# Patient Record
Sex: MALE | Race: WHITE | NOT HISPANIC OR LATINO | Employment: OTHER | RURAL
[De-identification: names, ages, dates, MRNs, and addresses within clinical notes are randomized per-mention and may not be internally consistent; named-entity substitution may affect disease eponyms.]

---

## 2017-01-27 RX ORDER — GABAPENTIN 800 MG/1
TABLET ORAL
Qty: 120 TABLET | Refills: 4 | Status: SHIPPED | OUTPATIENT
Start: 2017-01-27 | End: 2017-07-05 | Stop reason: SDUPTHER

## 2017-02-01 ENCOUNTER — TELEPHONE (OUTPATIENT)
Dept: FAMILY MEDICINE CLINIC | Facility: CLINIC | Age: 47
End: 2017-02-01

## 2017-02-01 RX ORDER — AMOXICILLIN AND CLAVULANATE POTASSIUM 875; 125 MG/1; MG/1
1 TABLET, FILM COATED ORAL 2 TIMES DAILY
Qty: 14 TABLET | Refills: 0 | Status: SHIPPED | OUTPATIENT
Start: 2017-02-01 | End: 2017-02-09

## 2017-02-01 NOTE — TELEPHONE ENCOUNTER
Patient called complaining of right ear pain and sore throat. He would like an antibiotic sent to [facility].  Ok to send in Augmentin 875 mg by mouth twice daily x 7 days per v.o. Dr Spencer/LARA Chen.  Med sent and pt notified.

## 2017-02-09 ENCOUNTER — OFFICE VISIT (OUTPATIENT)
Dept: FAMILY MEDICINE CLINIC | Facility: CLINIC | Age: 47
End: 2017-02-09

## 2017-02-09 VITALS
WEIGHT: 228.1 LBS | OXYGEN SATURATION: 98 % | HEART RATE: 94 BPM | SYSTOLIC BLOOD PRESSURE: 118 MMHG | HEIGHT: 72 IN | DIASTOLIC BLOOD PRESSURE: 64 MMHG | TEMPERATURE: 98.6 F | BODY MASS INDEX: 30.89 KG/M2

## 2017-02-09 DIAGNOSIS — Z79.4 TYPE 2 DIABETES MELLITUS WITH COMPLICATION, WITH LONG-TERM CURRENT USE OF INSULIN (HCC): Primary | ICD-10-CM

## 2017-02-09 DIAGNOSIS — IMO0002 UNCONTROLLED TYPE 2 DIABETES MELLITUS WITH OTHER NEUROLOGIC COMPLICATION, WITH LONG-TERM CURRENT USE OF INSULIN: ICD-10-CM

## 2017-02-09 DIAGNOSIS — G89.29 CHRONIC BILATERAL LOW BACK PAIN WITH SCIATICA, SCIATICA LATERALITY UNSPECIFIED: ICD-10-CM

## 2017-02-09 DIAGNOSIS — F33.0 MILD EPISODE OF RECURRENT MAJOR DEPRESSIVE DISORDER (HCC): ICD-10-CM

## 2017-02-09 DIAGNOSIS — I10 ESSENTIAL HYPERTENSION: ICD-10-CM

## 2017-02-09 DIAGNOSIS — E11.8 TYPE 2 DIABETES MELLITUS WITH COMPLICATION, WITH LONG-TERM CURRENT USE OF INSULIN (HCC): Primary | ICD-10-CM

## 2017-02-09 DIAGNOSIS — E66.09 NON MORBID OBESITY DUE TO EXCESS CALORIES: ICD-10-CM

## 2017-02-09 DIAGNOSIS — E11.69 HYPERLIPIDEMIA ASSOCIATED WITH TYPE 2 DIABETES MELLITUS (HCC): ICD-10-CM

## 2017-02-09 DIAGNOSIS — E78.5 HYPERLIPIDEMIA ASSOCIATED WITH TYPE 2 DIABETES MELLITUS (HCC): ICD-10-CM

## 2017-02-09 DIAGNOSIS — M54.40 CHRONIC BILATERAL LOW BACK PAIN WITH SCIATICA, SCIATICA LATERALITY UNSPECIFIED: ICD-10-CM

## 2017-02-09 DIAGNOSIS — Z71.6 TOBACCO ABUSE COUNSELING: ICD-10-CM

## 2017-02-09 DIAGNOSIS — Z98.890 HISTORY OF BACK SURGERY: ICD-10-CM

## 2017-02-09 PROCEDURE — 99214 OFFICE O/P EST MOD 30 MIN: CPT | Performed by: FAMILY MEDICINE

## 2017-02-09 RX ORDER — DULOXETIN HYDROCHLORIDE 60 MG/1
60 CAPSULE, DELAYED RELEASE ORAL DAILY
Qty: 90 CAPSULE | Refills: 3 | Status: SHIPPED | OUTPATIENT
Start: 2017-02-09 | End: 2018-03-03 | Stop reason: SDUPTHER

## 2017-02-09 RX ORDER — INSULIN GLARGINE 100 [IU]/ML
38 INJECTION, SOLUTION SUBCUTANEOUS NIGHTLY
Qty: 1 PEN | Refills: 5 | Status: SHIPPED | OUTPATIENT
Start: 2017-02-09 | End: 2017-05-30 | Stop reason: SDUPTHER

## 2017-02-09 RX ORDER — INSULIN GLARGINE 100 [IU]/ML
INJECTION, SOLUTION SUBCUTANEOUS
Refills: 4 | OUTPATIENT
Start: 2017-02-09

## 2017-02-09 RX ORDER — INSULIN GLARGINE 100 [IU]/ML
38 INJECTION, SOLUTION SUBCUTANEOUS NIGHTLY
COMMUNITY
Start: 2017-01-07 | End: 2017-02-09 | Stop reason: SDUPTHER

## 2017-02-12 PROBLEM — Z79.4 TYPE 2 DIABETES MELLITUS WITH COMPLICATION, WITH LONG-TERM CURRENT USE OF INSULIN (HCC): Status: ACTIVE | Noted: 2017-02-12

## 2017-02-12 PROBLEM — F33.0 MILD EPISODE OF RECURRENT MAJOR DEPRESSIVE DISORDER (HCC): Status: ACTIVE | Noted: 2017-02-12

## 2017-02-12 PROBLEM — E11.8 TYPE 2 DIABETES MELLITUS WITH COMPLICATION, WITH LONG-TERM CURRENT USE OF INSULIN (HCC): Status: ACTIVE | Noted: 2017-02-12

## 2017-02-12 NOTE — PATIENT INSTRUCTIONS
Fatigue  Fatigue is feeling tired all of the time, a lack of energy, or a lack of motivation. Occasional or mild fatigue is often a normal response to activity or life in general. However, long-lasting (chronic) or extreme fatigue may indicate an underlying medical condition.  HOME CARE INSTRUCTIONS   Watch your fatigue for any changes. The following actions may help to lessen any discomfort you are feeling:  · Talk to your health care provider about how much sleep you need each night. Try to get the required amount every night.  · Take medicines only as directed by your health care provider.  · Eat a healthy and nutritious diet. Ask your health care provider if you need help changing your diet.  · Drink enough fluid to keep your urine clear or pale yellow.  · Practice ways of relaxing, such as yoga, meditation, massage therapy, or acupuncture.  · Exercise regularly.    · Change situations that cause you stress. Try to keep your work and personal routine reasonable.  · Do not abuse illegal drugs.  · Limit alcohol intake to no more than 1 drink per day for nonpregnant women and 2 drinks per day for men. One drink equals 12 ounces of beer, 5 ounces of wine, or 1½ ounces of hard liquor.  · Take a multivitamin, if directed by your health care provider.  SEEK MEDICAL CARE IF:   · Your fatigue does not get better.  · You have a fever.    · You have unintentional weight loss or gain.  · You have headaches.    · You have difficulty:      Falling asleep.    Sleeping throughout the night.  · You feel angry, guilty, anxious, or sad.     · You are unable to have a bowel movement (constipation).    · You skin is dry.     · Your legs or another part of your body is swollen.    SEEK IMMEDIATE MEDICAL CARE IF:   · You feel confused.    · Your vision is blurry.  · You feel faint or pass out.    · You have a severe headache.    · You have severe abdominal, pelvic, or back pain.    · You have chest pain, shortness of breath, or an  irregular or fast heartbeat.    · You are unable to urinate or you urinate less than normal.    · You develop abnormal bleeding, such as bleeding from the rectum, vagina, nose, lungs, or nipples.  · You vomit blood.     · You have thoughts about harming yourself or committing suicide.    · You are worried that you might harm someone else.       This information is not intended to replace advice given to you by your health care provider. Make sure you discuss any questions you have with your health care provider.     Document Released: 10/14/2008 Document Revised: 01/08/2016 Document Reviewed: 04/21/2015  [employer] Interactive Patient Education ©2016 [employer]  Depression, Adult  Depression refers to feeling sad, low, down in the dumps, blue, gloomy, or empty. In general, there are two kinds of depression:  1. Normal sadness or normal grief. This kind of depression is one that we all feel from time to time after upsetting life experiences, such as the loss of a job or the ending of a relationship. This kind of depression is considered normal, is short lived, and resolves within a few days to 2 weeks. Depression experienced after the loss of a loved one (bereavement) often lasts longer than 2 weeks but normally gets better with time.  2. Clinical depression. This kind of depression lasts longer than normal sadness or normal grief or interferes with your ability to function at home, at work, and in school. It also interferes with your personal relationships. It affects almost every aspect of your life. Clinical depression is an illness.  Symptoms of depression can also be caused by conditions other than those mentioned above, such as:  · Physical illness. Some physical illnesses, including underactive thyroid gland (hypothyroidism), severe anemia, specific types of cancer, diabetes, uncontrolled seizures, heart and lung problems, strokes, and chronic pain are commonly associated with symptoms of depression.  · Side  effects of some prescription medicine. In some people, certain types of medicine can cause symptoms of depression.  · Substance abuse. Abuse of alcohol and illicit drugs can cause symptoms of depression.  SYMPTOMS  Symptoms of normal sadness and normal grief include the following:  · Feeling sad or crying for short periods of time.  · Not caring about anything (apathy).  · Difficulty sleeping or sleeping too much.  · No longer able to enjoy the things you used to enjoy.  · Desire to be by oneself all the time (social isolation).  · Lack of energy or motivation.  · Difficulty concentrating or remembering.  · Change in appetite or weight.  · Restlessness or agitation.  Symptoms of clinical depression include the same symptoms of normal sadness or normal grief and also the following symptoms:  · Feeling sad or crying all the time.  · Feelings of guilt or worthlessness.  · Feelings of hopelessness or helplessness.  · Thoughts of suicide or the desire to harm yourself (suicidal ideation).  · Loss of touch with reality (psychotic symptoms). Seeing or hearing things that are not real (hallucinations) or having false beliefs about your life or the people around you (delusions and paranoia).  DIAGNOSIS   The diagnosis of clinical depression is usually based on how bad the symptoms are and how long they have lasted. Your health care provider will also ask you questions about your medical history and substance use to find out if physical illness, use of prescription medicine, or substance abuse is causing your depression. Your health care provider may also order blood tests.  TREATMENT   Often, normal sadness and normal grief do not require treatment. However, sometimes antidepressant medicine is given for bereavement to ease the depressive symptoms until they resolve.  The treatment for clinical depression depends on how bad the symptoms are but often includes antidepressant medicine, counseling with a mental health  professional, or both. Your health care provider will help to determine what treatment is best for you.  Depression caused by physical illness usually goes away with appropriate medical treatment of the illness. If prescription medicine is causing depression, talk with your health care provider about stopping the medicine, decreasing the dose, or changing to another medicine.  Depression caused by the abuse of alcohol or illicit drugs goes away when you stop using these substances. Some adults need professional help in order to stop drinking or using drugs.  SEEK IMMEDIATE MEDICAL CARE IF:  · You have thoughts about hurting yourself or others.  · You lose touch with reality (have psychotic symptoms).  · You are taking medicine for depression and have a serious side effect.  FOR MORE INFORMATION  · National [place] on Mental Illness: www.bryan.org   · National [place] of Mental Health: www.nimh.nih.gov      This information is not intended to replace advice given to you by your health care provider. Make sure you discuss any questions you have with your health care provider.     Document Released: 12/15/2001 Document Revised: 01/08/2016 Document Reviewed: 03/18/2013  [employer] Interactive Patient Education ©2016 [employer]  Chronic Pain  Chronic pain can be defined as pain that is off and on and lasts for 3-6 months or longer. Many things cause chronic pain, which can make it difficult to make a diagnosis. There are many treatment options available for chronic pain. However, finding a treatment that works well for you may require trying various approaches until the right one is found. Many people benefit from a combination of two or more types of treatment to control their pain.  SYMPTOMS   Chronic pain can occur anywhere in the body and can range from mild to very severe. Some types of chronic pain include:  · Headache.  · Low back pain.  · Cancer pain.  · Arthritis pain.  · Neurogenic pain. This is pain  resulting from damage to nerves.   People with chronic pain may also have other symptoms such as:  · Depression.  · Anger.  · Insomnia.  · Anxiety.  DIAGNOSIS   Your health care provider will help diagnose your condition over time. In many cases, the initial focus will be on excluding possible conditions that could be causing the pain. Depending on your symptoms, your health care provider may order tests to diagnose your condition. Some of these tests may include:   · Blood tests.    · CT scan.    · MRI.    · X-rays.    · Ultrasounds.    · Nerve conduction studies.    You may need to see a specialist.   TREATMENT   Finding treatment that works well may take time. You may be referred to a pain specialist. He or she may prescribe medicine or therapies, such as:   · Mindful meditation or yoga.  · Shots (injections) of numbing or pain-relieving medicines into the spine or area of pain.  · Local electrical stimulation.  · Acupuncture.    · Massage therapy.    · Aroma, color, light, or sound therapy.    · Biofeedback.    · Working with a physical therapist to keep from getting stiff.    · Regular, gentle exercise.    · Cognitive or behavioral therapy.    · Group support.    Sometimes, surgery may be recommended.   HOME CARE INSTRUCTIONS   · Take all medicines as directed by your health care provider.    · Lessen stress in your life by relaxing and doing things such as listening to calming music.    · Exercise or be active as directed by your health care provider.    · Eat a healthy diet and include things such as vegetables, fruits, fish, and lean meats in your diet.    · Keep all follow-up appointments with your health care provider.    · Attend a support group with others suffering from chronic pain.  SEEK MEDICAL CARE IF:   · Your pain gets worse.    · You develop a new pain that was not there before.    · You cannot tolerate medicines given to you by your health care provider.    · You have new symptoms since your  last visit with your health care provider.    SEEK IMMEDIATE MEDICAL CARE IF:   · You feel weak.    · You have decreased sensation or numbness.    · You lose control of bowel or bladder function.    · Your pain suddenly gets much worse.    · You develop shaking.  · You develop chills.  · You develop confusion.  · You develop chest pain.  · You develop shortness of breath.    MAKE SURE YOU:  · Understand these instructions.  · Will watch your condition.  · Will get help right away if you are not doing well or get worse.     This information is not intended to replace advice given to you by your health care provider. Make sure you discuss any questions you have with your health care provider.     Document Released: 09/09/2003 Document Revised: 08/20/2014 Document Reviewed: 06/13/2014  [employer] Interactive Patient Education ©2016 [employer]

## 2017-02-12 NOTE — PROGRESS NOTES
Subjective   Mine Benedict is a 46 y.o. obese white male smoker with uncontrolled DM II, HTN, Diabetic neuropathy, Depression, Chronic Low back pain S/P Lumbar Laminectomy, and other health problems. see PMH/PSH. Pt presents for exam, F/u to discuss current health problems, meds, tx plan, recent eval with neurosurgery, and other health concerns.  ' R ear pain has improved some since [facility] called in. Feeling tired all the time, feeling weak all over. Has struggled with pain since losing coverage for Cymbalta on Ins, afraid to restart, coming off med was difficult, depressed about situation. Trying to watch diet, take Insulin, manage diabetes better. B/P has been good when checked'.    Weakness - Generalized   This is a chronic problem. The current episode started more than 1 month ago. The problem occurs daily. The problem has been gradually worsening. Associated symptoms include arthralgias, fatigue, myalgias and weakness. Pertinent negatives include no abdominal pain, chest pain or rash. The symptoms are aggravated by smoking. He has tried NSAIDs, relaxation, rest and acetaminophen for the symptoms. The treatment provided no relief.   Fatigue   This is a chronic problem. The current episode started more than 1 month ago. The problem occurs daily. The problem has been gradually worsening. Associated symptoms include arthralgias, fatigue, myalgias and weakness. Pertinent negatives include no abdominal pain, chest pain or rash. The symptoms are aggravated by smoking and eating. He has tried acetaminophen, NSAIDs and relaxation for the symptoms. The treatment provided no relief.   Diabetes   He presents for his follow-up diabetic visit. He has type 2 diabetes mellitus. No MedicAlert identification noted. His disease course has been fluctuating. Pertinent negatives for hypoglycemia include no confusion, nervousness/anxiousness or speech difficulty. Associated symptoms include fatigue and weakness. Pertinent negatives  for diabetes include no chest pain, no polydipsia, no polyphagia and no polyuria. There are no hypoglycemic complications. Symptoms are worsening. Diabetic complications include peripheral neuropathy. Current diabetic treatment includes diet and insulin injections. He is compliant with treatment most of the time. He is following a generally healthy diet. He has not had a previous visit with a dietitian. He participates in exercise intermittently. His home blood glucose trend is decreasing steadily. His overall blood glucose range is 180-200 mg/dl. An ACE inhibitor/angiotensin II receptor blocker is being taken. He does not see a podiatrist.Eye exam is current.   Hypertension   This is a chronic problem. The problem is unchanged. The problem is controlled. Pertinent negatives include no chest pain, palpitations or shortness of breath. Past treatments include ACE inhibitors. The current treatment provides significant improvement.   Back Pain   This is a chronic problem. The current episode started more than 1 year ago. The problem occurs daily. The problem has been gradually worsening since onset. The pain is present in the lumbar spine. The quality of the pain is described as aching. The pain is at a severity of 6/10. The pain is moderate. The symptoms are aggravated by stress, standing and bending. Associated symptoms include weakness. Pertinent negatives include no abdominal pain, chest pain or dysuria. He has tried analgesics, NSAIDs, muscle relaxant and home exercises for the symptoms. The treatment provided mild (Had good results with cymbalta in past.) relief.        The following portions of the patient's history were reviewed and updated as appropriate: allergies, current medications, past family history, past medical history, past social history, past surgical history and problem list.    Review of Systems   Constitutional: Positive for fatigue.   HENT: Positive for dental problem, ear pain and postnasal  drip.    Eyes: Negative for pain, discharge and visual disturbance.   Respiratory: Negative for apnea, shortness of breath and wheezing.    Cardiovascular: Negative for chest pain, palpitations and leg swelling.   Gastrointestinal: Negative for abdominal pain, blood in stool and constipation.   Endocrine: Negative for cold intolerance, heat intolerance, polydipsia, polyphagia and polyuria.   Genitourinary: Negative for discharge and dysuria.   Musculoskeletal: Positive for arthralgias, back pain and myalgias.   Skin: Negative for color change, rash and wound.   Allergic/Immunologic: Negative for environmental allergies and food allergies.   Neurological: Positive for weakness. Negative for syncope, facial asymmetry, speech difficulty and light-headedness.   Psychiatric/Behavioral: Positive for decreased concentration and dysphoric mood. Negative for agitation, behavioral problems, confusion, hallucinations, self-injury and suicidal ideas. The patient is not nervous/anxious.        Objective   Physical Exam   Constitutional: He is oriented to person, place, and time. He appears well-developed and well-nourished. No distress.   HENT:   Head: Normocephalic.   Right Ear: External ear normal.   Left Ear: External ear normal.   Nose: Nose normal.   Mouth/Throat: Oropharynx is clear and moist.   Eyes: Conjunctivae and EOM are normal. Pupils are equal, round, and reactive to light. Right eye exhibits no discharge. Left eye exhibits no discharge. No scleral icterus.   Neck: No JVD present. No tracheal deviation present. No thyromegaly present.   Cardiovascular: Normal rate and normal heart sounds.  Exam reveals no gallop and no friction rub.    No murmur heard.  Pulmonary/Chest: Effort normal and breath sounds normal. No stridor. No respiratory distress. He has no wheezes. He has no rales. He exhibits no tenderness.   Abdominal: Soft. Bowel sounds are normal. He exhibits no distension and no mass. There is no tenderness.  There is no rebound and no guarding. No hernia.   Musculoskeletal: He exhibits no edema, tenderness or deformity.   Has WB 6 with Claude SL, no worse with cross over.    Lymphadenopathy:     He has no cervical adenopathy.   Neurological: He is alert and oriented to person, place, and time. He has normal reflexes. He displays normal reflexes. No cranial nerve deficit. He exhibits normal muscle tone. Coordination normal.   Skin: Skin is warm and dry. No rash noted. He is not diaphoretic. No erythema. No pallor.   Psychiatric: His behavior is normal. Judgment and thought content normal.   Depressed mood       Assessment/Plan      Mine was seen today for weakness - generalized, generalized body aches and fatigue.    Diagnoses and all orders for this visit:    Type 2 diabetes mellitus with complication, with long-term current use of insulin  -     Hemoglobin A1c; Future  -     Comprehensive Metabolic Panel; Future  -     CBC & AUTO Differential; Future  -     Sedimentation Rate; Future  -     Urinalysis (clean catch); Future  -     MicroAlbumin, Urine, Random; Future    Hyperlipidemia associated with type 2 diabetes mellitus    Essential hypertension    Non morbid obesity due to excess calories    Uncontrolled type 2 diabetes mellitus with other neurologic complication, with long-term current use of insulin    History of back surgery    Tobacco abuse counseling    Chronic bilateral low back pain with sciatica, sciatica laterality unspecified    Mild episode of recurrent major depressive disorder    Other orders  -     DULoxetine (CYMBALTA) 60 MG capsule; Take 1 capsule by mouth Daily.  -     LANTUS SOLOSTAR 100 UNIT/ML injection pen; Inject 38 Units under the skin Every Night.      Discussed current health problem list, meds, indications, Tx plan. Discussed Cymbalta more affordable, Pt will investigate affordability, will restart if cost not prohibitive. Discussed BMI, BEE, 3-4 small meals, 1800 cristina/day ADA diet.  Discussed CBT therapy, counseling. Benefits of exercise. Discussed F/U plan here.

## 2017-02-14 ENCOUNTER — LAB (OUTPATIENT)
Dept: LAB | Facility: CLINIC | Age: 47
End: 2017-02-14

## 2017-02-14 DIAGNOSIS — E11.8 TYPE 2 DIABETES MELLITUS WITH COMPLICATION, WITH LONG-TERM CURRENT USE OF INSULIN (HCC): ICD-10-CM

## 2017-02-14 DIAGNOSIS — Z79.4 TYPE 2 DIABETES MELLITUS WITH COMPLICATION, WITH LONG-TERM CURRENT USE OF INSULIN (HCC): ICD-10-CM

## 2017-02-14 LAB
BASOPHILS # BLD AUTO: 0.04 10*3/MM3 (ref 0–0.2)
BASOPHILS NFR BLD AUTO: 0.5 % (ref 0–2)
BILIRUB UR QL STRIP: NEGATIVE
CLARITY UR: CLEAR
COLOR UR: ABNORMAL
DEPRECATED RDW RBC AUTO: 46.4 FL (ref 35.1–43.9)
EOSINOPHIL # BLD AUTO: 0.62 10*3/MM3 (ref 0–0.7)
EOSINOPHIL NFR BLD AUTO: 7.2 % (ref 0–7)
ERYTHROCYTE [DISTWIDTH] IN BLOOD BY AUTOMATED COUNT: 13.6 % (ref 11.5–14.5)
ERYTHROCYTE [SEDIMENTATION RATE] IN BLOOD: 3 MM/HR (ref 0–15)
GLUCOSE UR STRIP-MCNC: ABNORMAL MG/DL
HCT VFR BLD AUTO: 44.8 % (ref 39–49)
HGB BLD-MCNC: 15.3 G/DL (ref 13.7–17.3)
HGB UR QL STRIP.AUTO: NEGATIVE
IMM GRANULOCYTES # BLD: 0.03 10*3/MM3 (ref 0–0.02)
IMM GRANULOCYTES NFR BLD: 0.3 % (ref 0–0.5)
KETONES UR QL STRIP: ABNORMAL
LEUKOCYTE ESTERASE UR QL STRIP.AUTO: ABNORMAL
LYMPHOCYTES # BLD AUTO: 2.8 10*3/MM3 (ref 0.6–4.2)
LYMPHOCYTES NFR BLD AUTO: 32.5 % (ref 10–50)
MCH RBC QN AUTO: 32.2 PG (ref 26.5–34)
MCHC RBC AUTO-ENTMCNC: 34.2 G/DL (ref 31.5–36.3)
MCV RBC AUTO: 94.3 FL (ref 80–98)
MONOCYTES # BLD AUTO: 0.87 10*3/MM3 (ref 0–0.9)
MONOCYTES NFR BLD AUTO: 10.1 % (ref 0–12)
NEUTROPHILS # BLD AUTO: 4.26 10*3/MM3 (ref 2–8.6)
NEUTROPHILS NFR BLD AUTO: 49.4 % (ref 37–80)
NITRITE UR QL STRIP: NEGATIVE
PH UR STRIP.AUTO: <5 [PH] (ref 5–8)
PLATELET # BLD AUTO: 212 10*3/MM3 (ref 150–450)
PMV BLD AUTO: 11.6 FL (ref 8–12)
PROT UR QL STRIP: ABNORMAL
RBC # BLD AUTO: 4.75 10*6/MM3 (ref 4.37–5.74)
SP GR UR STRIP: 1.02 (ref 1–1.03)
UROBILINOGEN UR QL STRIP: ABNORMAL
WBC NRBC COR # BLD: 8.62 10*3/MM3 (ref 3.2–9.8)

## 2017-02-14 PROCEDURE — 83036 HEMOGLOBIN GLYCOSYLATED A1C: CPT | Performed by: FAMILY MEDICINE

## 2017-02-14 PROCEDURE — 80053 COMPREHEN METABOLIC PANEL: CPT | Performed by: FAMILY MEDICINE

## 2017-02-14 PROCEDURE — 85651 RBC SED RATE NONAUTOMATED: CPT | Performed by: FAMILY MEDICINE

## 2017-02-14 PROCEDURE — 82043 UR ALBUMIN QUANTITATIVE: CPT | Performed by: FAMILY MEDICINE

## 2017-02-14 PROCEDURE — 85025 COMPLETE CBC W/AUTO DIFF WBC: CPT | Performed by: FAMILY MEDICINE

## 2017-02-14 PROCEDURE — 81003 URINALYSIS AUTO W/O SCOPE: CPT | Performed by: FAMILY MEDICINE

## 2017-02-15 LAB
ALBUMIN SERPL-MCNC: 4 G/DL (ref 3.4–4.8)
ALBUMIN UR-MCNC: <0.6 MG/L
ALBUMIN/GLOB SERPL: 1.5 G/DL (ref 1.1–1.8)
ALP SERPL-CCNC: 76 U/L (ref 38–126)
ALT SERPL W P-5'-P-CCNC: 26 U/L (ref 21–72)
ANION GAP SERPL CALCULATED.3IONS-SCNC: 10 MMOL/L (ref 5–15)
AST SERPL-CCNC: 28 U/L (ref 17–59)
BILIRUB SERPL-MCNC: 0.3 MG/DL (ref 0.2–1.3)
BUN BLD-MCNC: 19 MG/DL (ref 7–21)
BUN/CREAT SERPL: 22.6 (ref 7–25)
CALCIUM SPEC-SCNC: 9 MG/DL (ref 8.4–10.2)
CHLORIDE SERPL-SCNC: 107 MMOL/L (ref 95–110)
CO2 SERPL-SCNC: 25 MMOL/L (ref 22–31)
CREAT BLD-MCNC: 0.84 MG/DL (ref 0.7–1.3)
GFR SERPL CREATININE-BSD FRML MDRD: 99 ML/MIN/1.73 (ref 63–147)
GLOBULIN UR ELPH-MCNC: 2.6 GM/DL (ref 2.3–3.5)
GLUCOSE BLD-MCNC: 201 MG/DL (ref 60–100)
HBA1C MFR BLD: 8.7 % (ref 4–5.6)
POTASSIUM BLD-SCNC: 5.4 MMOL/L (ref 3.5–5.1)
PROT SERPL-MCNC: 6.6 G/DL (ref 6.3–8.6)
SODIUM BLD-SCNC: 142 MMOL/L (ref 137–145)

## 2017-02-17 ENCOUNTER — TELEPHONE (OUTPATIENT)
Dept: FAMILY MEDICINE CLINIC | Facility: CLINIC | Age: 47
End: 2017-02-17

## 2017-02-17 NOTE — TELEPHONE ENCOUNTER
----- Message from Merlyn Stanton LPN sent at 2/16/2017  9:06 AM CST -----      ----- Message -----     From: Chris Spencer MD     Sent: 2/16/2017   8:03 AM       To: Merlyn Stanton LPN    Blood sugars have improved, still high, stay hydrated, eat small reg meals. C/W Insulin, will go over at next visit.

## 2017-02-23 ENCOUNTER — OFFICE VISIT (OUTPATIENT)
Dept: FAMILY MEDICINE CLINIC | Facility: CLINIC | Age: 47
End: 2017-02-23

## 2017-02-23 VITALS
HEART RATE: 82 BPM | DIASTOLIC BLOOD PRESSURE: 84 MMHG | TEMPERATURE: 97.5 F | BODY MASS INDEX: 31.77 KG/M2 | OXYGEN SATURATION: 99 % | SYSTOLIC BLOOD PRESSURE: 122 MMHG | WEIGHT: 234.6 LBS | HEIGHT: 72 IN

## 2017-02-23 DIAGNOSIS — E66.09 NON MORBID OBESITY DUE TO EXCESS CALORIES: ICD-10-CM

## 2017-02-23 DIAGNOSIS — F33.0 MILD EPISODE OF RECURRENT MAJOR DEPRESSIVE DISORDER (HCC): ICD-10-CM

## 2017-02-23 DIAGNOSIS — Z72.0 TOBACCO USER: ICD-10-CM

## 2017-02-23 DIAGNOSIS — M54.40 CHRONIC BILATERAL LOW BACK PAIN WITH SCIATICA, SCIATICA LATERALITY UNSPECIFIED: ICD-10-CM

## 2017-02-23 DIAGNOSIS — G89.29 CHRONIC BILATERAL LOW BACK PAIN WITH SCIATICA, SCIATICA LATERALITY UNSPECIFIED: ICD-10-CM

## 2017-02-23 DIAGNOSIS — E11.69 HYPERLIPIDEMIA ASSOCIATED WITH TYPE 2 DIABETES MELLITUS (HCC): Primary | ICD-10-CM

## 2017-02-23 DIAGNOSIS — IMO0002 UNCONTROLLED TYPE 2 DIABETES MELLITUS WITH OTHER NEUROLOGIC COMPLICATION, WITH LONG-TERM CURRENT USE OF INSULIN: ICD-10-CM

## 2017-02-23 DIAGNOSIS — I10 ESSENTIAL HYPERTENSION: ICD-10-CM

## 2017-02-23 DIAGNOSIS — Z98.890 HISTORY OF BACK SURGERY: ICD-10-CM

## 2017-02-23 DIAGNOSIS — E78.5 HYPERLIPIDEMIA ASSOCIATED WITH TYPE 2 DIABETES MELLITUS (HCC): Primary | ICD-10-CM

## 2017-02-23 PROCEDURE — 99214 OFFICE O/P EST MOD 30 MIN: CPT | Performed by: FAMILY MEDICINE

## 2017-02-23 RX ORDER — CYCLOBENZAPRINE HCL 10 MG
TABLET ORAL
Qty: 90 TABLET | Refills: 0 | Status: SHIPPED | OUTPATIENT
Start: 2017-02-23 | End: 2017-11-09

## 2017-02-23 RX ORDER — LISINOPRIL 20 MG/1
TABLET ORAL
Qty: 90 TABLET | Refills: 4 | Status: SHIPPED | OUTPATIENT
Start: 2017-02-23 | End: 2018-03-03 | Stop reason: SDUPTHER

## 2017-02-23 NOTE — PROGRESS NOTES
Subjective   Mine Benedict is a 46 y.o. obese white male smoker with uncontrolled DM II, HTN, Diabetic neuropathy, Depression, Chronic Low back pain S/P Lumbar Laminectomy, and other health problems. see PMH/PSH. Pt presents for exam, F/u to discuss current health problems, meds, tx plan and f/U plan.  ' Able to get Insulin, trying to watch diet again, Ins has approved Cymbalta, was helpful in past, but does not want to start if will not be able to continue, awful coming off cymbalta'.      Weakness - Generalized   This is a chronic problem. The current episode started more than 1 month ago. The problem occurs daily. The problem has been gradually worsening. Associated symptoms include arthralgias, fatigue, myalgias and weakness. Pertinent negatives include no abdominal pain, chest pain or rash. The symptoms are aggravated by smoking. He has tried NSAIDs, relaxation, rest and acetaminophen for the symptoms. The treatment provided mild relief.   Fatigue   This is a chronic problem. The current episode started more than 1 month ago. The problem occurs daily. The problem has been gradually worsening. Associated symptoms include arthralgias, fatigue, myalgias and weakness. Pertinent negatives include no abdominal pain, chest pain or rash. The symptoms are aggravated by smoking and eating. He has tried acetaminophen, NSAIDs and relaxation for the symptoms. The treatment provided no relief.   Diabetes   He presents for his follow-up diabetic visit. He has type 2 diabetes mellitus. No MedicAlert identification noted. His disease course has been fluctuating. Pertinent negatives for hypoglycemia include no confusion, nervousness/anxiousness or speech difficulty. Associated symptoms include fatigue and weakness. Pertinent negatives for diabetes include no chest pain, no polydipsia, no polyphagia and no polyuria. There are no hypoglycemic complications. Symptoms are worsening. Diabetic complications include peripheral  neuropathy. Current diabetic treatment includes diet and insulin injections. He is compliant with treatment most of the time. He is following a generally healthy diet. He has not had a previous visit with a dietitian. He participates in exercise intermittently. His home blood glucose trend is decreasing steadily. His overall blood glucose range is 180-200 mg/dl. An ACE inhibitor/angiotensin II receptor blocker is being taken. He does not see a podiatrist.Eye exam is current.   Hypertension   This is a chronic problem. The problem is unchanged. The problem is controlled. Pertinent negatives include no chest pain, palpitations or shortness of breath. Past treatments include ACE inhibitors. The current treatment provides significant improvement.   Back Pain   This is a chronic problem. The current episode started more than 1 year ago. The problem occurs daily. The problem has been gradually worsening since onset. The pain is present in the lumbar spine. The quality of the pain is described as aching. The pain is at a severity of 6/10. The pain is moderate. The symptoms are aggravated by stress, standing and bending. Associated symptoms include weakness. Pertinent negatives include no abdominal pain, chest pain or dysuria. He has tried analgesics, NSAIDs, muscle relaxant and home exercises for the symptoms. The treatment provided mild (Had good results with cymbalta in past.) relief.   Depression   Visit Type: follow-up  Patient presents with the following symptoms: anhedonia, decreased concentration, depressed mood, fatigue and malaise.  Patient is not experiencing: confusion, nervousness/anxiety, palpitations, shortness of breath and suicidal ideas.  Frequency of symptoms: most days   Severity: moderate   Sleep quality: fair  Nighttime awakenings: one to two  Side effects:  Insomnia and weight gain       The following portions of the patient's history were reviewed and updated as appropriate: allergies, current  medications, past family history, past medical history, past social history, past surgical history and problem list.    Review of Systems   Constitutional: Positive for fatigue.   HENT: Positive for dental problem, ear pain and postnasal drip.    Eyes: Negative for pain, discharge and visual disturbance.   Respiratory: Negative for apnea, shortness of breath and wheezing.    Cardiovascular: Negative for chest pain, palpitations and leg swelling.   Gastrointestinal: Negative for abdominal pain, blood in stool and constipation.   Endocrine: Negative for cold intolerance, heat intolerance, polydipsia, polyphagia and polyuria.   Genitourinary: Negative for discharge and dysuria.   Musculoskeletal: Positive for arthralgias, back pain and myalgias.   Skin: Negative for color change, rash and wound.   Allergic/Immunologic: Negative for environmental allergies and food allergies.   Neurological: Positive for weakness. Negative for syncope, facial asymmetry, speech difficulty and light-headedness.   Psychiatric/Behavioral: Positive for decreased concentration and dysphoric mood. Negative for agitation, behavioral problems, confusion, hallucinations, self-injury and suicidal ideas. The patient is not nervous/anxious.        Objective   Physical Exam   Constitutional: He is oriented to person, place, and time. He appears well-developed and well-nourished. No distress.   HENT:   Head: Normocephalic and atraumatic.   Right Ear: External ear normal.   Left Ear: External ear normal.   Nose: Nose normal.   Mouth/Throat: Oropharynx is clear and moist.   Eyes: Conjunctivae and EOM are normal. Pupils are equal, round, and reactive to light. Right eye exhibits no discharge. Left eye exhibits no discharge. No scleral icterus.   Neck: Normal range of motion. No JVD present. No tracheal deviation present. No thyromegaly present.   Cardiovascular: Normal rate, regular rhythm, normal heart sounds and intact distal pulses.  Exam reveals no  gallop and no friction rub.    No murmur heard.  Pulmonary/Chest: Effort normal and breath sounds normal. No stridor. No respiratory distress. He has no wheezes. He has no rales. He exhibits no tenderness.   Abdominal: Soft. Bowel sounds are normal. He exhibits no distension and no mass. There is no tenderness. There is no rebound and no guarding. No hernia.   Musculoskeletal: He exhibits no edema, tenderness or deformity.   Has WB 6 with Claude SL, no worse with cross over.    Lymphadenopathy:     He has no cervical adenopathy.   Neurological: He is alert and oriented to person, place, and time. He has normal reflexes. He displays normal reflexes. No cranial nerve deficit. He exhibits normal muscle tone. Coordination normal.   Skin: Skin is warm and dry. No rash noted. He is not diaphoretic. No erythema. No pallor.   Psychiatric: His behavior is normal. Judgment and thought content normal.   Depressed mood   Nursing note and vitals reviewed.      Assessment/Plan      Mine was seen today for diabetes, pain and depression.    Diagnoses and all orders for this visit:    Hyperlipidemia associated with type 2 diabetes mellitus    Essential hypertension    Non morbid obesity due to excess calories    Uncontrolled type 2 diabetes mellitus with other neurologic complication, with long-term current use of insulin    Chronic bilateral low back pain with sciatica, sciatica laterality unspecified    Tobacco user    History of back surgery    Mild episode of recurrent major depressive disorder      Discussed current health problem, meds, indications, tx plan, rationale. Pt has DM. Chronic back pain, had stopped all meds, due to $ issues. Has Ins, discussed improvement, goals, F/U plan. Smoking cessation discussed x 3mins.

## 2017-02-23 NOTE — PATIENT INSTRUCTIONS
Diabetes and Standards of Medical Care  Diabetes is complicated. You may find that your diabetes team includes a dietitian, nurse, diabetes educator, eye doctor, and more. To help everyone know what is going on and to help you get the care you deserve, the following schedule of care was developed to help keep you on track. Below are the tests, exams, vaccines, medicines, education, and plans you will need.  HbA1c test  This test shows how well you have controlled your glucose over the past 2-3 months. It is used to see if your diabetes management plan needs to be adjusted.   · It is performed at least 2 times a year if you are meeting treatment goals.  · It is performed 4 times a year if therapy has changed or if you are not meeting treatment goals.  Blood pressure test  · This test is performed at every routine medical visit. The goal is less than 140/90 mm Hg for most people, but 130/80 mm Hg in some cases. Ask your health care provider about your goal.  Dental exam  · Follow up with the dentist regularly.  Eye exam  · If you are diagnosed with type 1 diabetes as a child, get an exam upon reaching the age of 10 years or older and having had diabetes for 3-5 years. Yearly eye exams are recommended after that initial eye exam.  · If you are diagnosed with type 1 diabetes as an adult, get an exam within 5 years of diagnosis and then yearly.  · If you are diagnosed with type 2 diabetes, get an exam as soon as possible after the diagnosis and then yearly.  Foot care exam  · Visual foot exams are performed at every routine medical visit. The exams check for cuts, injuries, or other problems with the feet.  · You should have a complete foot exam performed every year. This exam includes an inspection of the structure and skin of your feet, a check of the pulses in your feet, and a check of the sensation in your feet.    Type 1 diabetes: The first exam is performed 5 years after diagnosis.    Type 2 diabetes: The first  exam is performed at the time of diagnosis.  · Check your feet nightly for cuts, injuries, or other problems with your feet. Tell your health care provider if anything is not healing.  Kidney function test (urine microalbumin)  · This test is performed once a year.  ¨ Type 1 diabetes: The first test is performed 5 years after diagnosis.  ¨ Type 2 diabetes: The first test is performed at the time of diagnosis.  · A serum creatinine and estimated glomerular filtration rate (eGFR) test is done once a year to assess the level of chronic kidney disease (CKD), if present.  Lipid profile (cholesterol, HDL, LDL, triglycerides)  · Performed every 5 years for most people.    The goal for LDL is less than 100 mg/dL. If you are at high risk, the goal is less than 70 mg/dL.    The goal for HDL is 40 mg/dL-50 mg/dL for men and 50 mg/dL-60 mg/dL for women. An HDL cholesterol of 60 mg/dL or higher gives some protection against heart disease.    The goal for triglycerides is less than 150 mg/dL.  Immunizations  · The flu (influenza) vaccine is recommended yearly for every person 6 months of age or older who has diabetes.  · The pneumonia (pneumococcal) vaccine is recommended for every person 2 years of age or older who has diabetes. Adults 65 years of age or older may receive the pneumonia vaccine as a series of two separate shots.  · The hepatitis B vaccine is recommended for adults shortly after they have been diagnosed with diabetes.  · The Tdap (tetanus, diphtheria, and pertussis) vaccine should be given:    According to normal childhood vaccination schedules, for children.    Every 10 years, for adults who have diabetes.  Diabetes self-management education  · Education is recommended at diagnosis and ongoing as needed.  Treatment plan  · Your treatment plan is reviewed at every medical visit.     This information is not intended to replace advice given to you by your health care provider. Make sure you discuss any questions you  have with your health care provider.     Document Released: 10/15/2010 Document Revised: 01/08/2016 Document Reviewed: 05/20/2014  Elsevier Interactive Patient Education ©2016 [employer]

## 2017-05-16 ENCOUNTER — OFFICE VISIT (OUTPATIENT)
Dept: FAMILY MEDICINE CLINIC | Facility: CLINIC | Age: 47
End: 2017-05-16

## 2017-05-16 VITALS
TEMPERATURE: 98.5 F | HEART RATE: 116 BPM | BODY MASS INDEX: 32.29 KG/M2 | OXYGEN SATURATION: 98 % | DIASTOLIC BLOOD PRESSURE: 78 MMHG | WEIGHT: 238.4 LBS | SYSTOLIC BLOOD PRESSURE: 124 MMHG | HEIGHT: 72 IN

## 2017-05-16 DIAGNOSIS — I10 ESSENTIAL HYPERTENSION: ICD-10-CM

## 2017-05-16 DIAGNOSIS — K04.7 ABSCESSED TOOTH: Primary | ICD-10-CM

## 2017-05-16 DIAGNOSIS — IMO0002 UNCONTROLLED TYPE 2 DIABETES MELLITUS WITH OTHER NEUROLOGIC COMPLICATION, WITH LONG-TERM CURRENT USE OF INSULIN: ICD-10-CM

## 2017-05-16 PROCEDURE — 96372 THER/PROPH/DIAG INJ SC/IM: CPT | Performed by: FAMILY MEDICINE

## 2017-05-16 PROCEDURE — 99214 OFFICE O/P EST MOD 30 MIN: CPT | Performed by: FAMILY MEDICINE

## 2017-05-16 RX ORDER — CEFTRIAXONE 1 G/1
1 INJECTION, POWDER, FOR SOLUTION INTRAMUSCULAR; INTRAVENOUS ONCE
Status: COMPLETED | OUTPATIENT
Start: 2017-05-16 | End: 2017-05-16

## 2017-05-16 RX ORDER — CEFDINIR 300 MG/1
300 CAPSULE ORAL 2 TIMES DAILY
Qty: 14 CAPSULE | Refills: 0 | Status: SHIPPED | OUTPATIENT
Start: 2017-05-16 | End: 2017-06-07

## 2017-05-16 RX ORDER — KETOROLAC TROMETHAMINE 10 MG/1
10 TABLET, FILM COATED ORAL EVERY 6 HOURS PRN
Qty: 12 TABLET | Refills: 0 | Status: SHIPPED | OUTPATIENT
Start: 2017-05-16 | End: 2017-06-07

## 2017-05-16 RX ORDER — HYDROCODONE BITARTRATE AND ACETAMINOPHEN 7.5; 325 MG/1; MG/1
1 TABLET ORAL EVERY 6 HOURS PRN
Qty: 30 TABLET | Refills: 0 | Status: SHIPPED | OUTPATIENT
Start: 2017-05-16 | End: 2017-05-22 | Stop reason: SDUPTHER

## 2017-05-16 RX ORDER — KETOROLAC TROMETHAMINE 30 MG/ML
30 INJECTION, SOLUTION INTRAMUSCULAR; INTRAVENOUS ONCE
Status: COMPLETED | OUTPATIENT
Start: 2017-05-16 | End: 2017-05-16

## 2017-05-16 RX ADMIN — CEFTRIAXONE 1 G: 1 INJECTION, POWDER, FOR SOLUTION INTRAMUSCULAR; INTRAVENOUS at 16:34

## 2017-05-16 RX ADMIN — KETOROLAC TROMETHAMINE 30 MG: 30 INJECTION, SOLUTION INTRAMUSCULAR; INTRAVENOUS at 16:35

## 2017-05-22 ENCOUNTER — OFFICE VISIT (OUTPATIENT)
Dept: FAMILY MEDICINE CLINIC | Facility: CLINIC | Age: 47
End: 2017-05-22

## 2017-05-22 VITALS
WEIGHT: 237.1 LBS | TEMPERATURE: 98.3 F | HEIGHT: 72 IN | DIASTOLIC BLOOD PRESSURE: 80 MMHG | BODY MASS INDEX: 32.12 KG/M2 | HEART RATE: 85 BPM | OXYGEN SATURATION: 99 % | SYSTOLIC BLOOD PRESSURE: 130 MMHG

## 2017-05-22 DIAGNOSIS — IMO0002 UNCONTROLLED TYPE 2 DIABETES MELLITUS WITH OTHER NEUROLOGIC COMPLICATION, WITH LONG-TERM CURRENT USE OF INSULIN: ICD-10-CM

## 2017-05-22 DIAGNOSIS — K04.7 ABSCESSED TOOTH: Primary | ICD-10-CM

## 2017-05-22 PROCEDURE — 99213 OFFICE O/P EST LOW 20 MIN: CPT | Performed by: FAMILY MEDICINE

## 2017-05-22 RX ORDER — HYDROCODONE BITARTRATE AND ACETAMINOPHEN 10; 325 MG/1; MG/1
1 TABLET ORAL EVERY 6 HOURS PRN
Qty: 30 TABLET | Refills: 0 | Status: SHIPPED | OUTPATIENT
Start: 2017-05-22 | End: 2017-06-07

## 2017-05-22 RX ORDER — HYDROCODONE BITARTRATE AND ACETAMINOPHEN 7.5; 325 MG/1; MG/1
1 TABLET ORAL EVERY 6 HOURS PRN
Qty: 30 TABLET | Refills: 0 | Status: SHIPPED | OUTPATIENT
Start: 2017-05-22 | End: 2017-05-22

## 2017-05-31 RX ORDER — INSULIN GLARGINE 100 [IU]/ML
INJECTION, SOLUTION SUBCUTANEOUS
Qty: 15 ML | Refills: 0 | Status: SHIPPED | OUTPATIENT
Start: 2017-05-31 | End: 2017-06-07

## 2017-06-07 ENCOUNTER — LAB (OUTPATIENT)
Dept: LAB | Facility: CLINIC | Age: 47
End: 2017-06-07

## 2017-06-07 ENCOUNTER — OFFICE VISIT (OUTPATIENT)
Dept: FAMILY MEDICINE CLINIC | Facility: CLINIC | Age: 47
End: 2017-06-07

## 2017-06-07 VITALS
OXYGEN SATURATION: 96 % | TEMPERATURE: 98.8 F | WEIGHT: 234.3 LBS | HEIGHT: 72 IN | HEART RATE: 93 BPM | DIASTOLIC BLOOD PRESSURE: 76 MMHG | BODY MASS INDEX: 31.74 KG/M2 | SYSTOLIC BLOOD PRESSURE: 109 MMHG

## 2017-06-07 DIAGNOSIS — K04.7 ABSCESSED TOOTH: ICD-10-CM

## 2017-06-07 DIAGNOSIS — G89.29 CHRONIC BILATERAL LOW BACK PAIN WITH SCIATICA, SCIATICA LATERALITY UNSPECIFIED: ICD-10-CM

## 2017-06-07 DIAGNOSIS — IMO0002 UNCONTROLLED TYPE 2 DIABETES MELLITUS WITH OTHER NEUROLOGIC COMPLICATION, WITH LONG-TERM CURRENT USE OF INSULIN: Primary | ICD-10-CM

## 2017-06-07 DIAGNOSIS — I10 ESSENTIAL HYPERTENSION: ICD-10-CM

## 2017-06-07 DIAGNOSIS — IMO0002 UNCONTROLLED TYPE 2 DIABETES MELLITUS WITH OTHER NEUROLOGIC COMPLICATION, WITH LONG-TERM CURRENT USE OF INSULIN: ICD-10-CM

## 2017-06-07 DIAGNOSIS — M54.40 CHRONIC BILATERAL LOW BACK PAIN WITH SCIATICA, SCIATICA LATERALITY UNSPECIFIED: ICD-10-CM

## 2017-06-07 PROCEDURE — 99214 OFFICE O/P EST MOD 30 MIN: CPT | Performed by: FAMILY MEDICINE

## 2017-06-07 PROCEDURE — 83036 HEMOGLOBIN GLYCOSYLATED A1C: CPT | Performed by: FAMILY MEDICINE

## 2017-06-07 RX ORDER — INSULIN GLARGINE 100 [IU]/ML
38 INJECTION, SOLUTION SUBCUTANEOUS NIGHTLY
Qty: 6 PEN | Refills: 5 | Status: SHIPPED | OUTPATIENT
Start: 2017-06-07 | End: 2017-12-28 | Stop reason: SDUPTHER

## 2017-06-07 RX ORDER — AMOXICILLIN AND CLAVULANATE POTASSIUM 875; 125 MG/1; MG/1
TABLET, FILM COATED ORAL
COMMUNITY
Start: 2017-06-03 | End: 2017-06-07

## 2017-06-07 NOTE — PROGRESS NOTES
Subjective    Mine Benedict is a 47 y.o. obese white male with uncontrolled DM II, HTN, Diabetic neuropathy, Depression, Chronic Low back pain S/P Lumbar Laminectomy, and other health problems. see PMH/PSH. Pt presents for exam, to discuss current acute health problem, meds, tx plan..     ' Had abscessed tooth extracted. Ins notified Lantus no longer covered will have to change to other long acting Insulin. Currently using Lantus 38 units daily, and Apidra 20-30 with meals.'    Diabetes   He presents for his follow-up diabetic visit. He has type 2 diabetes mellitus. No MedicAlert identification noted. His disease course has been fluctuating. Pertinent negatives for hypoglycemia include no confusion, nervousness/anxiousness or speech difficulty. Associated symptoms include fatigue. Pertinent negatives for diabetes include no chest pain, no polydipsia, no polyphagia, no polyuria and no weakness. There are no hypoglycemic complications. Symptoms are worsening. Diabetic complications include peripheral neuropathy. Current diabetic treatment includes diet and insulin injections. He is compliant with treatment most of the time. He is following a generally healthy diet. He has not had a previous visit with a dietitian. He participates in exercise intermittently. His home blood glucose trend is decreasing steadily. His overall blood glucose range is 180-200 mg/dl. An ACE inhibitor/angiotensin II receptor blocker is being taken. He does not see a podiatrist.Eye exam is current.   Hypertension   This is a chronic problem. The problem is unchanged. The problem is controlled. Pertinent negatives include no chest pain, palpitations or shortness of breath. Past treatments include ACE inhibitors. The current treatment provides significant improvement.   Dental Pain    The current episode started 1 to 4 weeks ago. The problem has been resolved. The pain is at a severity of 1/10. The pain is mild. Associated symptoms include  facial pain and thermal sensitivity. Pertinent negatives include no fever. He has tried NSAIDs (Norco, Ambesol, P.O Abx) for the symptoms. The treatment provided significant (Had tooth pulled.) relief.        The following portions of the patient's history were reviewed and updated as appropriate: allergies, current medications, past family history, past medical history, past social history, past surgical history and problem list.    Review of Systems   Constitutional: Positive for fatigue. Negative for fever.   HENT: Positive for dental problem. Negative for ear pain and postnasal drip.    Eyes: Negative for pain, discharge and visual disturbance.   Respiratory: Negative for apnea, shortness of breath and wheezing.    Cardiovascular: Negative for chest pain, palpitations and leg swelling.   Gastrointestinal: Negative for abdominal pain, blood in stool and constipation.   Endocrine: Negative for cold intolerance, heat intolerance, polydipsia, polyphagia and polyuria.   Genitourinary: Negative for discharge and dysuria.   Musculoskeletal: Positive for arthralgias, back pain and myalgias.   Skin: Negative for color change, rash and wound.   Allergic/Immunologic: Negative for environmental allergies and food allergies.   Neurological: Negative for syncope, facial asymmetry, speech difficulty, weakness and light-headedness.   Psychiatric/Behavioral: Positive for decreased concentration and dysphoric mood. Negative for agitation, behavioral problems, confusion, hallucinations, self-injury and suicidal ideas. The patient is not nervous/anxious.        Objective   Physical Exam   Constitutional: He is oriented to person, place, and time. He appears well-developed and well-nourished. No distress.   HENT:   Head: Normocephalic and atraumatic.   Right Ear: External ear normal.   Left Ear: External ear normal.   Nose: Nose normal.   Mouth/Throat: Oropharynx is clear and moist.   Gum inflammation R lower rear molar less  notable, has some drainage from tooth socket.    Eyes: Conjunctivae and EOM are normal. Pupils are equal, round, and reactive to light. Right eye exhibits no discharge. Left eye exhibits no discharge. No scleral icterus.   Neck: Normal range of motion. No JVD present. No tracheal deviation present. No thyromegaly present.   Cardiovascular: Normal rate, regular rhythm, normal heart sounds and intact distal pulses.  Exam reveals no gallop and no friction rub.    No murmur heard.  Pulmonary/Chest: Effort normal and breath sounds normal. No stridor. No respiratory distress. He has no wheezes. He has no rales. He exhibits no tenderness.   Abdominal: Soft. Bowel sounds are normal. He exhibits no distension. There is no tenderness.   Musculoskeletal: He exhibits no edema, tenderness or deformity.   Lymphadenopathy:     He has no cervical adenopathy.   Neurological: He is alert and oriented to person, place, and time. He has normal reflexes.   Skin: Skin is warm and dry. No rash noted. He is not diaphoretic. No erythema. No pallor.   Psychiatric: His behavior is normal. Judgment and thought content normal.   Depressed mood   Nursing note and vitals reviewed.      Assessment/Plan      Mine was seen today for diabetes.    Diagnoses and all orders for this visit:    Uncontrolled type 2 diabetes mellitus with other neurologic complication, with long-term current use of insulin  -     Hemoglobin A1c; Future  -     Insulin Glulisine 100 UNIT/ML solution pen-injector; Inject 5-10 units under the skin three times daily with meals  -     Insulin Glargine (BASAGLAR KWIKPEN) 100 UNIT/ML injection pen; Inject 38 Units under the skin Every Night.    Essential hypertension    Chronic bilateral low back pain with sciatica, sciatica laterality unspecified    Abscessed tooth      Discussed current health problems, meds, indications, tx plan. Discussed DM, tx plan, samples given will Rx Insulin per Ins formulary, pt will call if unable to  obtain insulin.           This document has been electronically signed by Chris Spencer MD

## 2017-06-08 LAB — HBA1C MFR BLD: 9.83 % (ref 4–5.6)

## 2017-06-11 NOTE — PATIENT INSTRUCTIONS
Diabetes and Standards of Medical Care  Diabetes is complicated. Your diabetes team may include a dietitian, nurse, diabetes educator, endocrinologist, eye doctor, your primary care provider, and others. To help everyone know what is going on and to help you get the care you deserve, the following schedule of care was developed to help keep you on track.  HbA1c test  This test shows how well you have controlled your glucose over the past 2-3 months. It is used to see if your diabetes management plan needs to be adjusted.   · It is performed at least 2 times a year if you are meeting treatment goals.  · It is performed 4 times a year if therapy has changed or if you are not meeting treatment goals.  Blood pressure test  · This test is performed at every routine medical visit. The goal is less than 140/90 mm Hg for most people, but 130/80 mm Hg in some cases. Ask your health care provider about your goal.  Dental exam  · Visit your dentist two times per year.  Eye exam  · If you are diagnosed with type 1 diabetes as a child, get an exam upon reaching the age of 10 years or older and having had diabetes for 3-5 years. Yearly eye exams are recommended after that initial eye exam.  · If you have type 1 diabetes, have an eye exam 3-5 years after you are diagnosed, and then once per year after your first exam.  · If you have type 2 diabetes, have an eye exam as soon as you are diagnosed, and then once per year after your first exam.  Foot care exam  · Visual foot exams are performed at every routine medical visit. The exams check for cuts, injuries, or other problems with the feet.  · You should have a complete foot exam performed every year. This exam includes an inspection of the structure and skin of your feet, a check of the pulses in your feet, and a check of the sensation in your feet.    Type 1 diabetes: The first exam is performed 3-5 years after diagnosis.    Type 2 diabetes: The first exam is performed at the  time of diagnosis.  · Check your feet nightly for cuts, injuries, or other problems with your feet. Tell your health care provider if anything is not healing.  Kidney function test (urine microalbumin)  · This test is performed once a year.  ¨ Type 1 diabetes: The first test is performed 5 years after diagnosis.  ¨ Type 2 diabetes: The first test is performed at the time of diagnosis.  · A serum creatinine and estimated glomerular filtration rate (eGFR) test is done once a year to assess the level of chronic kidney disease (CKD), if present.  Lipid profile (cholesterol, HDL, LDL, triglycerides)  · Performed every 5 years for most people.    The goal for LDL is less than 100 mg/dL. If you are at high risk, the goal is less than 70 mg/dL.    The goal for HDL is 40 mg/dL-50 mg/dL for men and 50 mg/dL-60 mg/dL for women. An HDL cholesterol of 60 mg/dL or higher gives some protection against heart disease.    The goal for triglycerides is less than 150 mg/dL.  Immunizations  · The flu (influenza) vaccine is recommended yearly for every person 6 months of age or older who has diabetes.  · The pneumonia (pneumococcal) vaccine is recommended for every person 2 years of age or older who has diabetes. Adults 65 years of age or older may receive the pneumonia vaccine as a series of two separate shots.  · The hepatitis B vaccine is recommended for adults shortly after they have been diagnosed with diabetes.  · The Tdap (tetanus, diphtheria, and pertussis) vaccine should be given:    According to normal childhood vaccination schedules, for children.    Every 10 years, for adults who have diabetes.  Mental and Emotional Health  · Screening for symptoms of eating disorders, anxiety, and depression is recommended at the time of diagnosis and afterward as needed. If your screening shows that you have symptoms (you have a positive screening result), you may need further evaluation and be referred to a mental health care  provider.  Diabetes self-management education  · Education is recommended at diagnosis and ongoing as needed.  Treatment plan  · Your treatment plan is reviewed at every medical visit.     This information is not intended to replace advice given to you by your health care provider. Make sure you discuss any questions you have with your health care provider.     Document Released: 10/15/2010 Document Revised: 04/10/2017 Document Reviewed: 05/20/2014  Elsevier Interactive Patient Education ©2017 [employer]

## 2017-06-12 ENCOUNTER — TELEPHONE (OUTPATIENT)
Dept: FAMILY MEDICINE CLINIC | Facility: CLINIC | Age: 47
End: 2017-06-12

## 2017-06-12 NOTE — TELEPHONE ENCOUNTER
----- Message from Merlyn Stanton LPN sent at 6/12/2017  8:02 AM CDT -----      ----- Message -----     From: Chris Spencer MD     Sent: 6/12/2017   6:00 AM       To: Merlyn Stanton LPN    HgbA1c  8.7 to 9.83 , make sure to let us know if unable to get insulin.

## 2017-06-13 ENCOUNTER — TELEPHONE (OUTPATIENT)
Dept: FAMILY MEDICINE CLINIC | Facility: CLINIC | Age: 47
End: 2017-06-13

## 2017-06-13 NOTE — TELEPHONE ENCOUNTER
Attempted to call pt.  No answer. LM for pt to return call 6/12/17.  Attempted to call pt again 6/13/17, no answer.  Card mailed to pt with results, to let office know if having difficulty getting his insulin.

## 2017-06-26 RX ORDER — SIMVASTATIN 40 MG
TABLET ORAL
Qty: 30 TABLET | Refills: 4 | OUTPATIENT
Start: 2017-06-26

## 2017-06-26 RX ORDER — SIMVASTATIN 40 MG
40 TABLET ORAL NIGHTLY
Qty: 30 TABLET | Refills: 3 | Status: SHIPPED | OUTPATIENT
Start: 2017-06-26 | End: 2017-11-06 | Stop reason: SDUPTHER

## 2017-07-05 ENCOUNTER — OFFICE VISIT (OUTPATIENT)
Dept: FAMILY MEDICINE CLINIC | Facility: CLINIC | Age: 47
End: 2017-07-05

## 2017-07-05 VITALS
BODY MASS INDEX: 31.67 KG/M2 | HEIGHT: 72 IN | OXYGEN SATURATION: 98 % | WEIGHT: 233.8 LBS | TEMPERATURE: 98.2 F | HEART RATE: 95 BPM | DIASTOLIC BLOOD PRESSURE: 72 MMHG | SYSTOLIC BLOOD PRESSURE: 128 MMHG

## 2017-07-05 DIAGNOSIS — IMO0002 UNCONTROLLED TYPE 2 DIABETES MELLITUS WITH OTHER NEUROLOGIC COMPLICATION, WITH LONG-TERM CURRENT USE OF INSULIN: ICD-10-CM

## 2017-07-05 DIAGNOSIS — F33.0 MILD EPISODE OF RECURRENT MAJOR DEPRESSIVE DISORDER (HCC): ICD-10-CM

## 2017-07-05 DIAGNOSIS — G89.29 CHRONIC BILATERAL LOW BACK PAIN WITH SCIATICA, SCIATICA LATERALITY UNSPECIFIED: ICD-10-CM

## 2017-07-05 DIAGNOSIS — I10 ESSENTIAL HYPERTENSION: ICD-10-CM

## 2017-07-05 DIAGNOSIS — Z79.4 TYPE 2 DIABETES MELLITUS WITH COMPLICATION, WITH LONG-TERM CURRENT USE OF INSULIN (HCC): ICD-10-CM

## 2017-07-05 DIAGNOSIS — Z79.899 HIGH RISK MEDICATION USE: ICD-10-CM

## 2017-07-05 DIAGNOSIS — E78.5 HYPERLIPIDEMIA ASSOCIATED WITH TYPE 2 DIABETES MELLITUS (HCC): ICD-10-CM

## 2017-07-05 DIAGNOSIS — M54.40 CHRONIC BILATERAL LOW BACK PAIN WITH SCIATICA, SCIATICA LATERALITY UNSPECIFIED: ICD-10-CM

## 2017-07-05 DIAGNOSIS — E11.69 TYPE 2 DIABETES MELLITUS WITH OTHER SPECIFIED COMPLICATION (HCC): Primary | ICD-10-CM

## 2017-07-05 DIAGNOSIS — E11.8 TYPE 2 DIABETES MELLITUS WITH COMPLICATION, WITH LONG-TERM CURRENT USE OF INSULIN (HCC): ICD-10-CM

## 2017-07-05 DIAGNOSIS — E78.2 MIXED HYPERLIPIDEMIA: ICD-10-CM

## 2017-07-05 DIAGNOSIS — E66.09 NON MORBID OBESITY DUE TO EXCESS CALORIES: ICD-10-CM

## 2017-07-05 DIAGNOSIS — Z98.890 HISTORY OF BACK SURGERY: ICD-10-CM

## 2017-07-05 DIAGNOSIS — E11.69 HYPERLIPIDEMIA ASSOCIATED WITH TYPE 2 DIABETES MELLITUS (HCC): ICD-10-CM

## 2017-07-05 DIAGNOSIS — Z72.0 TOBACCO USER: ICD-10-CM

## 2017-07-05 PROCEDURE — 80307 DRUG TEST PRSMV CHEM ANLYZR: CPT | Performed by: FAMILY MEDICINE

## 2017-07-05 PROCEDURE — 99214 OFFICE O/P EST MOD 30 MIN: CPT | Performed by: FAMILY MEDICINE

## 2017-07-05 RX ORDER — GABAPENTIN 800 MG/1
800 TABLET ORAL 3 TIMES DAILY
Qty: 120 TABLET | Refills: 4 | Status: SHIPPED | OUTPATIENT
Start: 2017-07-05 | End: 2017-08-15 | Stop reason: SDUPTHER

## 2017-07-05 NOTE — PATIENT INSTRUCTIONS
Diabetes and Standards of Medical Care  Diabetes is complicated. Your diabetes team may include a dietitian, nurse, diabetes educator, endocrinologist, eye doctor, your primary care provider, and others. To help everyone know what is going on and to help you get the care you deserve, the following schedule of care was developed to help keep you on track.  HbA1c test  This test shows how well you have controlled your glucose over the past 2-3 months. It is used to see if your diabetes management plan needs to be adjusted.   · It is performed at least 2 times a year if you are meeting treatment goals.  · It is performed 4 times a year if therapy has changed or if you are not meeting treatment goals.  Blood pressure test  · This test is performed at every routine medical visit. The goal is less than 140/90 mm Hg for most people, but 130/80 mm Hg in some cases. Ask your health care provider about your goal.  Dental exam  · Visit your dentist two times per year.  Eye exam  · If you are diagnosed with type 1 diabetes as a child, get an exam upon reaching the age of 10 years or older and having had diabetes for 3-5 years. Yearly eye exams are recommended after that initial eye exam.  · If you have type 1 diabetes, have an eye exam 3-5 years after you are diagnosed, and then once per year after your first exam.  · If you have type 2 diabetes, have an eye exam as soon as you are diagnosed, and then once per year after your first exam.  Foot care exam  · Visual foot exams are performed at every routine medical visit. The exams check for cuts, injuries, or other problems with the feet.  · You should have a complete foot exam performed every year. This exam includes an inspection of the structure and skin of your feet, a check of the pulses in your feet, and a check of the sensation in your feet.    Type 1 diabetes: The first exam is performed 3-5 years after diagnosis.    Type 2 diabetes: The first exam is performed at the  time of diagnosis.  · Check your feet nightly for cuts, injuries, or other problems with your feet. Tell your health care provider if anything is not healing.  Kidney function test (urine microalbumin)  · This test is performed once a year.  ¨ Type 1 diabetes: The first test is performed 5 years after diagnosis.  ¨ Type 2 diabetes: The first test is performed at the time of diagnosis.  · A serum creatinine and estimated glomerular filtration rate (eGFR) test is done once a year to assess the level of chronic kidney disease (CKD), if present.  Lipid profile (cholesterol, HDL, LDL, triglycerides)  · Performed every 5 years for most people.    The goal for LDL is less than 100 mg/dL. If you are at high risk, the goal is less than 70 mg/dL.    The goal for HDL is 40 mg/dL-50 mg/dL for men and 50 mg/dL-60 mg/dL for women. An HDL cholesterol of 60 mg/dL or higher gives some protection against heart disease.    The goal for triglycerides is less than 150 mg/dL.  Immunizations  · The flu (influenza) vaccine is recommended yearly for every person 6 months of age or older who has diabetes.  · The pneumonia (pneumococcal) vaccine is recommended for every person 2 years of age or older who has diabetes. Adults 65 years of age or older may receive the pneumonia vaccine as a series of two separate shots.  · The hepatitis B vaccine is recommended for adults shortly after they have been diagnosed with diabetes.  · The Tdap (tetanus, diphtheria, and pertussis) vaccine should be given:    According to normal childhood vaccination schedules, for children.    Every 10 years, for adults who have diabetes.  Mental and Emotional Health  · Screening for symptoms of eating disorders, anxiety, and depression is recommended at the time of diagnosis and afterward as needed. If your screening shows that you have symptoms (you have a positive screening result), you may need further evaluation and be referred to a mental health care  provider.  Diabetes self-management education  · Education is recommended at diagnosis and ongoing as needed.  Treatment plan  · Your treatment plan is reviewed at every medical visit.     This information is not intended to replace advice given to you by your health care provider. Make sure you discuss any questions you have with your health care provider.     Document Released: 10/15/2010 Document Revised: 04/10/2017 Document Reviewed: 05/20/2014  [employer] Patient Education ©2017 [employer]    Have HgbA1c checked after 8 -07- 2017

## 2017-07-05 NOTE — PROGRESS NOTES
Subjective     Mine Benedict is a 47 y.o. obese white male with uncontrolled DM II, HTN, Diabetic neuropathy, Depression, Chronic Low back pain H/O Lumbar Laminectomy, and other health problems. see PMH/PSH. Pt presents for exam, to discuss current health problems, tx and F/U plan.     ' Started back monitoring FSBS closely, Ins is now covering Basaglar, using 45 units each night.  Using short acting 25-35 units with meals, most 2 hr PP less than 200 now. Back pain about same as usual sometimes 6-8/10 depending on activity. Neuropathy in feet seems worse. B/P has been OK when checked.     Diabetes   He presents for his follow-up diabetic visit. He has type 2 diabetes mellitus. No MedicAlert identification noted. His disease course has been fluctuating. Pertinent negatives for hypoglycemia include no confusion, nervousness/anxiousness or speech difficulty. Associated symptoms include fatigue. Pertinent negatives for diabetes include no chest pain, no polydipsia, no polyphagia, no polyuria and no weakness. There are no hypoglycemic complications. Symptoms are worsening. Diabetic complications include peripheral neuropathy. Current diabetic treatment includes diet and insulin injections. He is compliant with treatment most of the time. He is following a generally healthy diet. He has not had a previous visit with a dietitian. He participates in exercise intermittently. His home blood glucose trend is decreasing steadily. His overall blood glucose range is 180-200 mg/dl. An ACE inhibitor/angiotensin II receptor blocker is being taken. He does not see a podiatrist.Eye exam is current.   Hypertension   This is a chronic problem. The problem is unchanged. The problem is controlled. Pertinent negatives include no chest pain, palpitations or shortness of breath. Past treatments include ACE inhibitors. The current treatment provides significant improvement.   Back Pain   This is a chronic problem. The current episode  started more than 1 year ago. The problem has been waxing and waning since onset. The pain is present in the lumbar spine. The quality of the pain is described as aching. The pain is at a severity of 6/10. The pain is moderate. The pain is the same all the time. Pertinent negatives include no abdominal pain, chest pain, dysuria, fever or weakness. He has tried analgesics, bed rest, home exercises, muscle relaxant, NSAIDs and walking for the symptoms. The treatment provided mild relief.        The following portions of the patient's history were reviewed and updated as appropriate: allergies, current medications, past family history, past medical history, past social history, past surgical history and problem list.    Review of Systems   Constitutional: Positive for fatigue. Negative for fever.   HENT: Negative for dental problem, ear pain and postnasal drip.    Eyes: Negative for pain, discharge and visual disturbance.   Respiratory: Negative for apnea, shortness of breath and wheezing.    Cardiovascular: Negative for chest pain, palpitations and leg swelling.   Gastrointestinal: Negative for abdominal pain, blood in stool and constipation.   Endocrine: Negative for cold intolerance, heat intolerance, polydipsia, polyphagia and polyuria.   Genitourinary: Negative for discharge and dysuria.   Musculoskeletal: Positive for arthralgias, back pain and myalgias.   Skin: Negative for color change, rash and wound.   Allergic/Immunologic: Negative for environmental allergies and food allergies.   Neurological: Negative for syncope, facial asymmetry, speech difficulty, weakness and light-headedness.   Psychiatric/Behavioral: Positive for decreased concentration and dysphoric mood. Negative for agitation, behavioral problems, confusion, hallucinations, self-injury and suicidal ideas. The patient is not nervous/anxious.        Objective   Physical Exam   Constitutional: He is oriented to person, place, and time. He appears  well-developed and well-nourished. No distress.   HENT:   Head: Normocephalic and atraumatic.   Right Ear: External ear normal.   Left Ear: External ear normal.   Nose: Nose normal.   Mouth/Throat: Oropharynx is clear and moist.   Eyes: Conjunctivae and EOM are normal. Pupils are equal, round, and reactive to light. Right eye exhibits no discharge. Left eye exhibits no discharge. No scleral icterus.   Neck: Normal range of motion. No JVD present. No tracheal deviation present. No thyromegaly present.   Cardiovascular: Normal rate, regular rhythm, normal heart sounds and intact distal pulses.  Exam reveals no gallop and no friction rub.    No murmur heard.  Pulmonary/Chest: Effort normal and breath sounds normal. No stridor. No respiratory distress. He has no wheezes. He has no rales. He exhibits no tenderness.   Abdominal: Soft. Bowel sounds are normal. He exhibits no distension. There is no tenderness.   Musculoskeletal: He exhibits no edema, tenderness or deformity.   Has WB 4 with flexion , extension,  Rotation of low back.   Lymphadenopathy:     He has no cervical adenopathy.   Neurological: He is alert and oriented to person, place, and time. He has normal reflexes.   Skin: Skin is warm and dry. No rash noted. He is not diaphoretic. No erythema. No pallor.   Psychiatric: He has a normal mood and affect. His behavior is normal. Judgment and thought content normal.   Depressed mood improved.   Nursing note and vitals reviewed.      Assessment/Plan      Mine was seen today for diabetes, hypertension, depression, back pain and pain.    Diagnoses and all orders for this visit:    Type 2 diabetes mellitus with other specified complication  -     Hemoglobin A1c; Future    High risk medication use  -     Urine Drug Screen    Hyperlipidemia associated with type 2 diabetes mellitus    Essential hypertension  Comments:  Lisinopril    Non morbid obesity due to excess calories    Uncontrolled type 2 diabetes mellitus with  other neurologic complication, with long-term current use of insulin    Type 2 diabetes mellitus with complication, with long-term current use of insulin    Chronic bilateral low back pain with sciatica, sciatica laterality unspecified  Comments:  Cymbalata, NSAID, Neurontin    Tobacco user    History of back surgery    Mild episode of recurrent major depressive disorder  Comments:  Cymbalta    Mixed hyperlipidemia    Other orders  -     gabapentin (NEURONTIN) 800 MG tablet; Take 1 tablet by mouth 3 (Three) Times a Day.        Discussed current health problem list, meds indications, tx plan, rationale. Discussed Neurontin now controlled medJOSE ANTONIO reviewed, contract signed, UDS. Discussed F/U plan here. Recheck HgbA1c q 3 month goal is 7 or less. Discussed BMI, BEE.           This document has been electronically signed by Chris Spencer MD

## 2017-07-06 LAB
AMPHET+METHAMPHET UR QL: NEGATIVE
BARBITURATES UR QL SCN: NEGATIVE
BENZODIAZ UR QL SCN: NEGATIVE
CANNABINOIDS SERPL QL: NEGATIVE
COCAINE UR QL: NEGATIVE
METHADONE UR QL SCN: NEGATIVE
OPIATES UR QL: NEGATIVE
OXYCODONE UR QL SCN: NEGATIVE

## 2017-07-07 PROBLEM — Z79.899 HIGH RISK MEDICATION USE: Status: ACTIVE | Noted: 2017-07-07

## 2017-07-07 PROBLEM — E11.9 TYPE 2 DIABETES MELLITUS: Status: ACTIVE | Noted: 2017-07-07

## 2017-07-07 PROBLEM — E78.2 MIXED HYPERLIPIDEMIA: Status: ACTIVE | Noted: 2017-07-07

## 2017-07-11 RX ORDER — PEN NEEDLE, DIABETIC 30 GX3/16"
1 NEEDLE, DISPOSABLE MISCELLANEOUS 4 TIMES DAILY
Qty: 200 EACH | Refills: 5 | Status: SHIPPED | OUTPATIENT
Start: 2017-07-11 | End: 2019-07-22 | Stop reason: SDUPTHER

## 2017-08-14 ENCOUNTER — TELEPHONE (OUTPATIENT)
Dept: FAMILY MEDICINE CLINIC | Facility: CLINIC | Age: 47
End: 2017-08-14

## 2017-08-14 NOTE — TELEPHONE ENCOUNTER
Pt states his Rx for gabapentin was not written correctly, he takes it QID, only got 90 pills for TID.

## 2017-08-15 RX ORDER — GABAPENTIN 800 MG/1
800 TABLET ORAL 4 TIMES DAILY
Qty: 120 TABLET | Refills: 4 | Status: SHIPPED | OUTPATIENT
Start: 2017-08-15 | End: 2018-01-05 | Stop reason: SDUPTHER

## 2017-08-15 NOTE — TELEPHONE ENCOUNTER
New script with directions changed printed for pt.  Pt called 8/14/17 stating he takes gabapentin QID, not TID.  Did not get enough pills for 1 month.

## 2017-09-14 RX ORDER — NAPROXEN 500 MG/1
TABLET ORAL
Qty: 60 TABLET | Refills: 5 | Status: SHIPPED | OUTPATIENT
Start: 2017-09-14 | End: 2017-11-09

## 2017-11-06 RX ORDER — SIMVASTATIN 40 MG
TABLET ORAL
Qty: 30 TABLET | Refills: 2 | Status: SHIPPED | OUTPATIENT
Start: 2017-11-06 | End: 2018-02-12 | Stop reason: SDUPTHER

## 2017-11-09 ENCOUNTER — OFFICE VISIT (OUTPATIENT)
Dept: FAMILY MEDICINE CLINIC | Facility: CLINIC | Age: 47
End: 2017-11-09

## 2017-11-09 ENCOUNTER — LAB (OUTPATIENT)
Dept: LAB | Facility: CLINIC | Age: 47
End: 2017-11-09

## 2017-11-09 VITALS
TEMPERATURE: 98.4 F | HEIGHT: 72 IN | WEIGHT: 225.2 LBS | DIASTOLIC BLOOD PRESSURE: 80 MMHG | OXYGEN SATURATION: 100 % | BODY MASS INDEX: 30.5 KG/M2 | SYSTOLIC BLOOD PRESSURE: 126 MMHG | HEART RATE: 93 BPM

## 2017-11-09 DIAGNOSIS — IMO0002 UNCONTROLLED TYPE 2 DIABETES MELLITUS WITH OTHER NEUROLOGIC COMPLICATION, WITH LONG-TERM CURRENT USE OF INSULIN: ICD-10-CM

## 2017-11-09 DIAGNOSIS — E11.69 TYPE 2 DIABETES MELLITUS WITH OTHER SPECIFIED COMPLICATION (HCC): ICD-10-CM

## 2017-11-09 DIAGNOSIS — E11.8 TYPE 2 DIABETES MELLITUS WITH COMPLICATION, WITH LONG-TERM CURRENT USE OF INSULIN (HCC): ICD-10-CM

## 2017-11-09 DIAGNOSIS — E11.69 HYPERLIPIDEMIA ASSOCIATED WITH TYPE 2 DIABETES MELLITUS (HCC): ICD-10-CM

## 2017-11-09 DIAGNOSIS — I10 ESSENTIAL HYPERTENSION: ICD-10-CM

## 2017-11-09 DIAGNOSIS — Z79.4 TYPE 2 DIABETES MELLITUS WITH COMPLICATION, WITH LONG-TERM CURRENT USE OF INSULIN (HCC): ICD-10-CM

## 2017-11-09 DIAGNOSIS — Z23 NEED FOR IMMUNIZATION AGAINST INFLUENZA: Primary | ICD-10-CM

## 2017-11-09 DIAGNOSIS — E78.5 HYPERLIPIDEMIA ASSOCIATED WITH TYPE 2 DIABETES MELLITUS (HCC): ICD-10-CM

## 2017-11-09 DIAGNOSIS — F32.9 DEPRESSION, REACTIVE: ICD-10-CM

## 2017-11-09 DIAGNOSIS — G47.01 INSOMNIA DUE TO MEDICAL CONDITION: ICD-10-CM

## 2017-11-09 DIAGNOSIS — E66.9 NON MORBID OBESITY: ICD-10-CM

## 2017-11-09 LAB — HBA1C MFR BLD: 7.4 % (ref 4–5.6)

## 2017-11-09 PROCEDURE — 90471 IMMUNIZATION ADMIN: CPT | Performed by: FAMILY MEDICINE

## 2017-11-09 PROCEDURE — 83036 HEMOGLOBIN GLYCOSYLATED A1C: CPT | Performed by: FAMILY MEDICINE

## 2017-11-09 PROCEDURE — 90686 IIV4 VACC NO PRSV 0.5 ML IM: CPT | Performed by: FAMILY MEDICINE

## 2017-11-09 PROCEDURE — 99214 OFFICE O/P EST MOD 30 MIN: CPT | Performed by: FAMILY MEDICINE

## 2017-11-09 RX ORDER — DOXEPIN HYDROCHLORIDE 50 MG/1
50 CAPSULE ORAL NIGHTLY
Qty: 30 CAPSULE | Refills: 3 | Status: SHIPPED | OUTPATIENT
Start: 2017-11-09 | End: 2017-12-20 | Stop reason: DRUGHIGH

## 2017-11-09 NOTE — PROGRESS NOTES
Subjective   Mine Benedict is a 47 y.o. obese white male with uncontrolled DM II, HTN, Diabetic neuropathy, Depression, Chronic Low back pain H/O Lumbar Laminectomy, and other health problems. see PMH/PSH. Pt presents for exam, to discuss current health problems, tx and F/U plan.     'Has been depressed , step daughter moved out, concerned about her relationship. Having trouble sleeping, causing daytime fatigued and worsened anxiety. Going to counseling for MH.  B/P has been OK.  Blood sugars improving.  Going to counselor     Diabetes   He presents for his follow-up diabetic visit. He has type 2 diabetes mellitus. No MedicAlert identification noted. His disease course has been fluctuating. Pertinent negatives for hypoglycemia include no confusion, nervousness/anxiousness or speech difficulty. Associated symptoms include fatigue and weight loss. Pertinent negatives for diabetes include no chest pain, no polydipsia, no polyphagia, no polyuria and no weakness. There are no hypoglycemic complications. Symptoms are worsening. Diabetic complications include peripheral neuropathy. Current diabetic treatment includes diet and insulin injections. He is compliant with treatment most of the time. He is following a generally healthy diet. He has not had a previous visit with a dietitian. He participates in exercise intermittently. His home blood glucose trend is decreasing steadily. His overall blood glucose range is 180-200 mg/dl. An ACE inhibitor/angiotensin II receptor blocker is being taken. He does not see a podiatrist.Eye exam is current.   Hypertension   This is a chronic problem. The problem is unchanged. The problem is controlled. Pertinent negatives include no chest pain, palpitations or shortness of breath. Past treatments include ACE inhibitors. The current treatment provides significant improvement.   Back Pain   This is a chronic problem. The current episode started more than 1 year ago. The problem has been  waxing and waning since onset. The pain is present in the lumbar spine. The quality of the pain is described as aching. The pain is at a severity of 6/10. The pain is moderate. The pain is the same all the time. Associated symptoms include weight loss. Pertinent negatives include no abdominal pain, chest pain, dysuria, fever or weakness. He has tried analgesics, bed rest, home exercises, muscle relaxant, NSAIDs and walking for the symptoms. The treatment provided mild relief.   Depression   Visit Type: follow-up (Acute/Chronic started with step daughter moving away.)  Patient presents with the following symptoms: decreased concentration, depressed mood, excessive worry, insomnia, irritability, muscle tension, panic, restlessness and weight loss.  Patient is not experiencing: confusion, nervousness/anxiety, palpitations, shortness of breath, suicidal ideas and suicidal planning.  Frequency of symptoms: most days          The following portions of the patient's history were reviewed and updated as appropriate: allergies, current medications, past family history, past medical history, past social history, past surgical history and problem list.    Review of Systems   Constitutional: Positive for fatigue, irritability and weight loss. Negative for fever.   HENT: Negative for dental problem, ear pain and postnasal drip.    Eyes: Negative for pain, discharge and visual disturbance.   Respiratory: Negative for apnea, shortness of breath and wheezing.    Cardiovascular: Negative for chest pain, palpitations and leg swelling.   Gastrointestinal: Negative for abdominal pain, blood in stool and constipation.   Endocrine: Negative for cold intolerance, heat intolerance, polydipsia, polyphagia and polyuria.   Genitourinary: Negative for discharge and dysuria.   Musculoskeletal: Positive for arthralgias, back pain and myalgias.   Skin: Negative for color change, rash and wound.   Allergic/Immunologic: Negative for environmental  allergies and food allergies.   Neurological: Negative for syncope, facial asymmetry, speech difficulty, weakness and light-headedness.   Psychiatric/Behavioral: Positive for decreased concentration and dysphoric mood. Negative for agitation, behavioral problems, confusion, hallucinations, self-injury and suicidal ideas. The patient has insomnia. The patient is not nervous/anxious.        Objective   Physical Exam   Constitutional: He is oriented to person, place, and time. He appears well-developed and well-nourished. No distress.   HENT:   Head: Normocephalic and atraumatic.   Right Ear: External ear normal.   Left Ear: External ear normal.   Nose: Nose normal.   Mouth/Throat: Oropharynx is clear and moist.   Eyes: Conjunctivae and EOM are normal. Pupils are equal, round, and reactive to light. Right eye exhibits no discharge. Left eye exhibits no discharge. No scleral icterus.   Neck: Normal range of motion. No JVD present. No tracheal deviation present. No thyromegaly present.   Cardiovascular: Normal rate, regular rhythm, normal heart sounds and intact distal pulses.  Exam reveals no gallop and no friction rub.    No murmur heard.  Pulmonary/Chest: Effort normal and breath sounds normal. No stridor. No respiratory distress. He has no wheezes. He has no rales. He exhibits no tenderness.   Abdominal: Soft. Bowel sounds are normal. He exhibits no distension and no mass. There is no tenderness. There is no rebound and no guarding. No hernia.   Musculoskeletal: He exhibits no edema, tenderness or deformity.   Has WB 4 with flexion , extension,  Rotation of low back.    Mine had a diabetic foot exam performed today.   During the foot exam he had a monofilament test performed.    Neurological Sensory Findings -  Unaltered sharp/dull right ankle/foot discrimination and unaltered sharp/dull left ankle/foot discrimination.    Vascular Status -  His exam exhibits right foot vasculature normal. His exam exhibits left  foot vasculature normal.   Skin Integrity  -  His right foot skin is intact.  He has callous right foot.    Mine 's left foot skin is intact. He has callous left foot..   Foot Structure and Biomechanics -  His right foot exhibits hallux valgus.  His left foot exhibits hallux valgus.  Lymphadenopathy:     He has no cervical adenopathy.   Neurological: He is alert and oriented to person, place, and time. He has normal reflexes. He displays normal reflexes. No cranial nerve deficit. He exhibits normal muscle tone. Coordination normal.   Skin: Skin is warm and dry. No rash noted. He is not diaphoretic. No erythema. No pallor.   Psychiatric: His behavior is normal. Judgment and thought content normal.   Depressed mood worse.    Nursing note and vitals reviewed.      Assessment/Plan   Mine was seen today for diabetes, hypertension, depression, anxiety and sleeping problem.    Diagnoses and all orders for this visit:    Need for immunization against influenza  -     Flu Vaccine Quad PF 3YR+    Hyperlipidemia associated with type 2 diabetes mellitus    Essential hypertension    Non morbid obesity    Uncontrolled type 2 diabetes mellitus with other neurologic complication, with long-term current use of insulin    Type 2 diabetes mellitus with complication, with long-term current use of insulin    Other orders  -     doxepin (SINEquan) 50 MG capsule; Take 1 capsule by mouth Every Night.      Discussed current health problem list, meds, indications, tx plan. Discussed depression , insomnia, meds tx plan, rationale. Discussed DM , goals. Discussed standards of care. Discussed F/U.          This document has been electronically signed by Chris Spencer MD on November 9, 2017

## 2017-11-10 ENCOUNTER — TELEPHONE (OUTPATIENT)
Dept: FAMILY MEDICINE CLINIC | Facility: CLINIC | Age: 47
End: 2017-11-10

## 2017-11-10 NOTE — TELEPHONE ENCOUNTER
----- Message from Chris Spencer MD sent at 11/9/2017  1:55 PM CST -----  Blood sugars have improved, good work HgbA1c 9.83 to 7.4  Goal is 7 or less will recheck in 3 months.

## 2017-12-19 RX ORDER — GABAPENTIN 800 MG/1
TABLET ORAL
Qty: 120 TABLET | Refills: 2 | OUTPATIENT
Start: 2017-12-19

## 2017-12-20 ENCOUNTER — TELEPHONE (OUTPATIENT)
Dept: FAMILY MEDICINE CLINIC | Facility: CLINIC | Age: 47
End: 2017-12-20

## 2017-12-20 RX ORDER — DOXEPIN HYDROCHLORIDE 150 MG/1
150 CAPSULE ORAL NIGHTLY
Qty: 30 CAPSULE | Refills: 5 | Status: SHIPPED | OUTPATIENT
Start: 2017-12-20 | End: 2019-03-22 | Stop reason: ALTCHOICE

## 2017-12-20 NOTE — TELEPHONE ENCOUNTER
Pt stopped in office c/o doxepin does not seem to be helping now.  Per Dr. Spencer, okay to increase to 150mg, sent in to pharmacy.

## 2017-12-28 DIAGNOSIS — IMO0002 UNCONTROLLED TYPE 2 DIABETES MELLITUS WITH OTHER NEUROLOGIC COMPLICATION, WITH LONG-TERM CURRENT USE OF INSULIN: ICD-10-CM

## 2017-12-28 RX ORDER — INSULIN GLULISINE 100 [IU]/ML
INJECTION, SOLUTION SUBCUTANEOUS
Qty: 15 ML | Refills: 4 | Status: SHIPPED | OUTPATIENT
Start: 2017-12-28 | End: 2018-05-27 | Stop reason: SDUPTHER

## 2017-12-28 RX ORDER — INSULIN GLARGINE 100 [IU]/ML
INJECTION, SOLUTION SUBCUTANEOUS
Qty: 15 ML | Refills: 4 | Status: SHIPPED | OUTPATIENT
Start: 2017-12-28 | End: 2018-06-01 | Stop reason: SDUPTHER

## 2018-01-05 ENCOUNTER — TELEPHONE (OUTPATIENT)
Dept: FAMILY MEDICINE CLINIC | Facility: CLINIC | Age: 48
End: 2018-01-05

## 2018-01-05 RX ORDER — GABAPENTIN 800 MG/1
800 TABLET ORAL 4 TIMES DAILY
Qty: 120 TABLET | Refills: 0 | OUTPATIENT
Start: 2018-01-05 | End: 2018-02-02 | Stop reason: SDUPTHER

## 2018-01-05 NOTE — TELEPHONE ENCOUNTER
Pharmacy called to verify doxepin does was to be increased to the 150mg.  Also wanted to check to see if he was to also remain on the cymbalta with the doxepin.

## 2018-01-05 NOTE — TELEPHONE ENCOUNTER
Yes should take Cymbalta in morning routinely for pain, and depression.  Doxepin  2hrs before Bedtime as needed for insomnia. Aware of risk of serotonin syndrome with TCAs, and SNRI combined.

## 2018-01-05 NOTE — TELEPHONE ENCOUNTER
Spoke with pharmacy regarding cymbalta and doxepin.  Called in 1 month refill for gabapentin, spoke with Mary.  Pt will come in for scheduled appt in February.

## 2018-02-02 ENCOUNTER — OFFICE VISIT (OUTPATIENT)
Dept: FAMILY MEDICINE CLINIC | Facility: CLINIC | Age: 48
End: 2018-02-02

## 2018-02-02 ENCOUNTER — LAB (OUTPATIENT)
Dept: LAB | Facility: CLINIC | Age: 48
End: 2018-02-02

## 2018-02-02 VITALS
DIASTOLIC BLOOD PRESSURE: 72 MMHG | TEMPERATURE: 98.1 F | WEIGHT: 250.9 LBS | HEIGHT: 72 IN | BODY MASS INDEX: 33.98 KG/M2 | SYSTOLIC BLOOD PRESSURE: 126 MMHG | HEART RATE: 122 BPM | OXYGEN SATURATION: 99 %

## 2018-02-02 DIAGNOSIS — IMO0002 UNCONTROLLED TYPE 2 DIABETES MELLITUS WITH OTHER NEUROLOGIC COMPLICATION, WITH LONG-TERM CURRENT USE OF INSULIN: ICD-10-CM

## 2018-02-02 DIAGNOSIS — E78.5 HYPERLIPIDEMIA ASSOCIATED WITH TYPE 2 DIABETES MELLITUS (HCC): ICD-10-CM

## 2018-02-02 DIAGNOSIS — Z79.4 TYPE 2 DIABETES MELLITUS WITH OTHER SPECIFIED COMPLICATION, WITH LONG-TERM CURRENT USE OF INSULIN (HCC): Primary | ICD-10-CM

## 2018-02-02 DIAGNOSIS — E11.69 TYPE 2 DIABETES MELLITUS WITH OTHER SPECIFIED COMPLICATION, WITH LONG-TERM CURRENT USE OF INSULIN (HCC): ICD-10-CM

## 2018-02-02 DIAGNOSIS — I10 ESSENTIAL HYPERTENSION: ICD-10-CM

## 2018-02-02 DIAGNOSIS — M54.40 CHRONIC BILATERAL LOW BACK PAIN WITH SCIATICA, SCIATICA LATERALITY UNSPECIFIED: ICD-10-CM

## 2018-02-02 DIAGNOSIS — E11.69 HYPERLIPIDEMIA ASSOCIATED WITH TYPE 2 DIABETES MELLITUS (HCC): ICD-10-CM

## 2018-02-02 DIAGNOSIS — Z79.4 TYPE 2 DIABETES MELLITUS WITH COMPLICATION, WITH LONG-TERM CURRENT USE OF INSULIN (HCC): ICD-10-CM

## 2018-02-02 DIAGNOSIS — E78.2 MIXED HYPERLIPIDEMIA: ICD-10-CM

## 2018-02-02 DIAGNOSIS — M25.511 CHRONIC RIGHT SHOULDER PAIN: ICD-10-CM

## 2018-02-02 DIAGNOSIS — Z72.0 TOBACCO USER: ICD-10-CM

## 2018-02-02 DIAGNOSIS — G47.01 INSOMNIA DUE TO MEDICAL CONDITION: ICD-10-CM

## 2018-02-02 DIAGNOSIS — E66.9 NON MORBID OBESITY: ICD-10-CM

## 2018-02-02 DIAGNOSIS — G89.29 CHRONIC RIGHT SHOULDER PAIN: ICD-10-CM

## 2018-02-02 DIAGNOSIS — E11.8 TYPE 2 DIABETES MELLITUS WITH COMPLICATION, WITH LONG-TERM CURRENT USE OF INSULIN (HCC): ICD-10-CM

## 2018-02-02 DIAGNOSIS — E11.69 TYPE 2 DIABETES MELLITUS WITH OTHER SPECIFIED COMPLICATION, WITH LONG-TERM CURRENT USE OF INSULIN (HCC): Primary | ICD-10-CM

## 2018-02-02 DIAGNOSIS — G89.29 CHRONIC BILATERAL LOW BACK PAIN WITH SCIATICA, SCIATICA LATERALITY UNSPECIFIED: ICD-10-CM

## 2018-02-02 DIAGNOSIS — Z79.4 TYPE 2 DIABETES MELLITUS WITH OTHER SPECIFIED COMPLICATION, WITH LONG-TERM CURRENT USE OF INSULIN (HCC): ICD-10-CM

## 2018-02-02 LAB — HBA1C MFR BLD: 8.1 % (ref 4–5.6)

## 2018-02-02 PROCEDURE — 84681 ASSAY OF C-PEPTIDE: CPT | Performed by: FAMILY MEDICINE

## 2018-02-02 PROCEDURE — 83036 HEMOGLOBIN GLYCOSYLATED A1C: CPT | Performed by: FAMILY MEDICINE

## 2018-02-02 PROCEDURE — 99214 OFFICE O/P EST MOD 30 MIN: CPT | Performed by: FAMILY MEDICINE

## 2018-02-02 RX ORDER — GABAPENTIN 800 MG/1
800 TABLET ORAL 4 TIMES DAILY
Qty: 120 TABLET | Refills: 0 | OUTPATIENT
Start: 2018-02-02 | End: 2018-02-02 | Stop reason: SDUPTHER

## 2018-02-02 RX ORDER — NAPROXEN 500 MG/1
TABLET ORAL
COMMUNITY
Start: 2017-12-28 | End: 2018-06-27 | Stop reason: ALTCHOICE

## 2018-02-02 RX ORDER — GABAPENTIN 800 MG/1
800 TABLET ORAL 4 TIMES DAILY
Qty: 120 TABLET | Refills: 3 | OUTPATIENT
Start: 2018-02-02 | End: 2018-05-02 | Stop reason: SDUPTHER

## 2018-02-02 NOTE — PATIENT INSTRUCTIONS
Diabetes Mellitus and Standards of Medical Care  Managing diabetes (diabetes mellitus) can be complicated. Your diabetes treatment may be managed by a team of health care providers, including:  · A diet and nutrition specialist (registered dietitian).  · A nurse.  · A certified diabetes educator (CDE).  · A diabetes specialist (endocrinologist).  · An eye doctor.  · A primary care provider.  · A dentist.  Your health care providers follow a schedule in order to help you get the best quality of care. The following schedule is a general guideline for your diabetes management plan. Your health care providers may also give you more specific instructions.  HbA1c (  hemoglobin A1c) test  This test provides information about blood sugar (glucose) control over the previous 2-3 months. It is used to check whether your diabetes management plan needs to be adjusted.  · If you are meeting your treatment goals, this test is done at least 2 times a year.  · If you are not meeting treatment goals or if your treatment goals have changed, this test is done 4 times a year.  Blood pressure test  · This test is done at every routine medical visit. For most people, the goal is less than 140/90. In some cases, your goal blood pressure may be 130/80 or less. Ask your health care provider what your goal blood pressure should be.  Dental and eye exams  · Visit your dentist two times a year.  · If you have type 1 diabetes, get an eye exam 3-5 years after you are diagnosed, and then once a year after your first exam.  ¨ If you were diagnosed with type 1 diabetes as a child, get an eye exam when you are age 10 or older and have had diabetes for 3-5 years. After the first exam, you should get an eye exam once a year.  · If you have type 2 diabetes, have an eye exam as soon as you are diagnosed, and then once a year after your first exam.  Foot care exam  · Visual foot exams are done at every routine medical visit. The exams check for cuts,  bruises, redness, blisters, sores, or other problems with the feet.  · A complete foot exam is done by your health care provider once a year. This exam includes an inspection of the structure and skin of your feet, and a check of the pulses and sensation in your feet.  ¨ Type 1 diabetes: Get your first exam 3-5 years after diagnosis.  ¨ Type 2 diabetes: Get your first exam as soon as you are diagnosed.  · Check your feet every day for cuts, bruises, redness, blisters, or sores. If you have any of these or other problems that are not healing, contact your health care provider.  Kidney function test (  urine microalbumin)  · This test is done once a year.  ¨ Type 1 diabetes: Get your first test 5 years after diagnosis.  ¨ Type 2 diabetes: Get your first test as soon as you are diagnosed.  · If you have chronic kidney disease (CKD), get a serum creatinine and estimated glomerular filtration rate (eGFR) test once a year.  Lipid profile (cholesterol, HDL, LDL, triglycerides)  · This test should be done when you are diagnosed with diabetes, and every 5 years after the first test. If you are on medicines to lower your cholesterol, you may need to get this test done every year.  ¨ The goal for LDL is less than 100 mg/dL (5.5 mmol/L). If you are at high risk, the goal is less than 70 mg/dL (3.9 mmol/L).  ¨ The goal for HDL is 40 mg/dL (2.2 mmol/L) for men and 50 mg/dL(2.8 mmol/L) for women. An HDL cholesterol of 60 mg/dL (3.3 mmol/L) or higher gives some protection against heart disease.  ¨ The goal for triglycerides is less than 150 mg/dL (8.3 mmol/L).  Immunizations  · The yearly flu (influenza) vaccine is recommended for everyone 6 months or older who has diabetes.  · The pneumonia (pneumococcal) vaccine is recommended for everyone 2 years or older who has diabetes. If you are 65 or older, you may get the pneumonia vaccine as a series of two separate shots.  · The hepatitis B vaccine is recommended for adults shortly  after they have been diagnosed with diabetes.  · The Tdap (tetanus, diphtheria, and pertussis) vaccine should be given:  ¨ According to normal childhood vaccination schedules, for children.  ¨ Every 10 years, for adults who have diabetes.  · The shingles vaccine is recommended for people who have had chicken pox and are 50 years or older.  Mental and emotional health  · Screening for symptoms of eating disorders, anxiety, and depression is recommended at the time of diagnosis and afterward as needed. If your screening shows that you have symptoms (you have a positive screening result), you may need further evaluation and be referred to a mental health care provider.  Diabetes self-management education  · Education about how to manage your diabetes is recommended at diagnosis and ongoing as needed.  Treatment plan  · Your treatment plan will be reviewed at every medical visit.  Summary  · Managing diabetes (diabetes mellitus) can be complicated. Your diabetes treatment may be managed by a team of health care providers.  · Your health care providers follow a schedule in order to help you get the best quality of care.  · Standards of care including having regular physical exams, blood tests, blood pressure monitoring, immunizations, screening tests, and education about how to manage your diabetes.  · Your health care providers may also give you more specific instructions based on your individual health.  This information is not intended to replace advice given to you by your health care provider. Make sure you discuss any questions you have with your health care provider.  Document Released: 10/15/2010 Document Revised: 09/15/2017 Document Reviewed: 09/15/2017  [employer] Patient Education © 2017 [employer]

## 2018-02-04 DIAGNOSIS — E11.69 TYPE 2 DIABETES MELLITUS WITH OTHER SPECIFIED COMPLICATION, WITH LONG-TERM CURRENT USE OF INSULIN (HCC): ICD-10-CM

## 2018-02-04 DIAGNOSIS — Z79.4 TYPE 2 DIABETES MELLITUS WITH OTHER SPECIFIED COMPLICATION, WITH LONG-TERM CURRENT USE OF INSULIN (HCC): ICD-10-CM

## 2018-02-04 PROBLEM — M25.511 RIGHT SHOULDER PAIN: Status: ACTIVE | Noted: 2018-02-04

## 2018-02-04 NOTE — PROGRESS NOTES
"Subjective   Mine Benedict is a 47 y.o. male. Mine Benedict is a 47 y.o. obese white male with uncontrolled DM II, HTN, Diabetic neuropathy, Depression, Chronic Low back pain H/O Lumbar Laminectomy, and other health problems. see PMH/PSH. Pt presents for exam, to discuss current health problems, Tx and F/U plan.     ' Has longterm use of Insulin Blood sugars are Labile , believe would benefit from a CGM. R shoulder pain hamlet been flaring.  Concerned for gaining weight . B/P has been OK when checked.\"     Diabetes   He presents for his follow-up diabetic visit. He has type 2 diabetes mellitus. No MedicAlert identification noted. The initial diagnosis of diabetes was made 23 years ago. Hypoglycemia symptoms include nervousness/anxiousness, sleepiness and sweats. Pertinent negatives for hypoglycemia include no confusion, dizziness, headaches, hunger, mood changes, pallor, seizures, speech difficulty or tremors. Associated symptoms include fatigue, foot paresthesias, visual change and weakness. Pertinent negatives for diabetes include no blurred vision, no chest pain, no foot ulcerations, no polydipsia, no polyphagia, no polyuria and no weight loss. Hypoglycemia complications include nocturnal hypoglycemia, required assistance and required glucagon injection. Pertinent negatives for hypoglycemia complications include no hospitalization. Symptoms are stable. Diabetic complications include impotence, peripheral neuropathy and retinopathy. Pertinent negatives for diabetic complications include no CVA, heart disease, nephropathy or PVD. Risk factors for coronary artery disease include dyslipidemia, obesity and tobacco exposure. Current diabetic treatment includes insulin injections. He is compliant with treatment all of the time. He is currently taking insulin pre-breakfast, pre-lunch, pre-dinner and at bedtime. Insulin injections are given by patient. Rotation sites for injection include the abdominal wall, arms and " thighs. His weight is increasing rapidly. He is following a generally healthy diet. Meal planning includes avoidance of concentrated sweets. He has not had a previous visit with a dietitian. He participates in exercise daily. He monitors blood glucose at home 5+ x per day. He monitors urine at home <1 x per month. Blood glucose monitoring compliance is good. His home blood glucose trend is decreasing steadily. His breakfast blood glucose is taken between 6-7 am. His breakfast blood glucose range is generally 110-130 mg/dl. His lunch blood glucose is taken between 12-1 pm. His lunch blood glucose range is generally 140-180 mg/dl. His dinner blood glucose is taken between 7-8 pm. His dinner blood glucose range is generally 140-180 mg/dl. His highest blood glucose is >200 mg/dl. His overall blood glucose range is 140-180 mg/dl. He does not see a podiatrist.Eye exam is not current.   Hypertension   This is a chronic problem. The current episode started more than 1 year ago. The problem has been waxing and waning since onset. Associated symptoms include sweats. Pertinent negatives include no blurred vision, chest pain, headaches, palpitations or shortness of breath. There are no associated agents to hypertension. Risk factors for coronary artery disease include smoking/tobacco exposure. Hypertensive end-organ damage includes retinopathy. There is no history of CVA or PVD.   Shoulder Problem   This is a chronic problem. The current episode started more than 1 year ago. The problem has been waxing and waning. Associated symptoms include arthralgias, fatigue, myalgias, a visual change and weakness. Pertinent negatives include no abdominal pain, chest pain, fever, headaches or rash. The symptoms are aggravated by stress (Use). He has tried NSAIDs and acetaminophen for the symptoms. The treatment provided mild relief.        The following portions of the patient's history were reviewed and updated as appropriate: allergies,  current medications, past family history, past medical history, past social history, past surgical history and problem list.    Review of Systems   Constitutional: Positive for fatigue. Negative for fever and weight loss.   HENT: Negative for dental problem, ear pain and postnasal drip.    Eyes: Negative for blurred vision, pain, discharge and visual disturbance.   Respiratory: Negative for apnea, shortness of breath and wheezing.    Cardiovascular: Negative for chest pain, palpitations and leg swelling.   Gastrointestinal: Negative for abdominal pain, blood in stool and constipation.   Endocrine: Negative for cold intolerance, heat intolerance, polydipsia, polyphagia and polyuria.   Genitourinary: Positive for impotence. Negative for discharge and dysuria.   Musculoskeletal: Positive for arthralgias, back pain and myalgias.   Skin: Negative for color change, pallor, rash and wound.   Allergic/Immunologic: Negative for environmental allergies and food allergies.   Neurological: Positive for weakness. Negative for dizziness, tremors, seizures, syncope, facial asymmetry, speech difficulty, light-headedness and headaches.   Psychiatric/Behavioral: Positive for decreased concentration and dysphoric mood. Negative for agitation, behavioral problems, confusion, hallucinations, self-injury and suicidal ideas. The patient is nervous/anxious.        Objective   Physical Exam   Constitutional: He is oriented to person, place, and time. He appears well-developed and well-nourished. No distress.   HENT:   Head: Normocephalic and atraumatic.   Right Ear: External ear normal.   Left Ear: External ear normal.   Nose: Nose normal.   Mouth/Throat: Oropharynx is clear and moist.   Eyes: Conjunctivae and EOM are normal. Pupils are equal, round, and reactive to light. Right eye exhibits no discharge. Left eye exhibits no discharge. No scleral icterus.   Neck: Normal range of motion. No JVD present. No tracheal deviation present. No  thyromegaly present.   Cardiovascular: Normal rate, regular rhythm, normal heart sounds and intact distal pulses.  Exam reveals no gallop and no friction rub.    No murmur heard.  Pulmonary/Chest: Effort normal and breath sounds normal. No stridor. No respiratory distress. He has no wheezes. He has no rales. He exhibits no tenderness.   Abdominal: Soft. Bowel sounds are normal. He exhibits no distension and no mass. There is no tenderness. There is no rebound and no guarding. No hernia.   Musculoskeletal: He exhibits no edema, tenderness or deformity.   Has WB 4 with flexion , extension,  Rotation of low back. Has WB 6 with ROM R shoulder    Mine had a diabetic foot exam performed today.   During the foot exam he had a monofilament test performed.    Neurological Sensory Findings -  Unaltered sharp/dull right ankle/foot discrimination and unaltered sharp/dull left ankle/foot discrimination.    Vascular Status -  His exam exhibits right foot vasculature normal. His exam exhibits left foot vasculature normal.   Skin Integrity  -  His right foot skin is intact.  He has callous right foot.    Mine 's left foot skin is intact. He has callous left foot..   Foot Structure and Biomechanics -  His right foot exhibits hallux valgus.  His left foot exhibits hallux valgus.  Lymphadenopathy:     He has no cervical adenopathy.   Neurological: He is alert and oriented to person, place, and time. He has normal reflexes. He displays normal reflexes. No cranial nerve deficit. He exhibits normal muscle tone. Coordination normal.   Skin: Skin is warm and dry. No rash noted. He is not diaphoretic. No erythema. No pallor.   Psychiatric: His behavior is normal. Judgment and thought content normal.   Depressed mood worse.    Nursing note and vitals reviewed.      Assessment/Plan   Mine was seen today for diabetes, hypertension, shoulder problem and weight gain.    Diagnoses and all orders for this visit:    Type 2 diabetes mellitus  with other specified complication, with long-term current use of insulin  -     Hemoglobin A1c; Future  -     Discontinue: gabapentin (NEURONTIN) 800 MG tablet; Take 1 tablet by mouth 4 (Four) Times a Day.  -     C-Peptide; Future  -     gabapentin (NEURONTIN) 800 MG tablet; Take 1 tablet by mouth 4 (Four) Times a Day.    Hyperlipidemia associated with type 2 diabetes mellitus    Essential hypertension    Mixed hyperlipidemia    Non morbid obesity    Chronic right shoulder pain    Uncontrolled type 2 diabetes mellitus with other neurologic complication, with long-term current use of insulin    Type 2 diabetes mellitus with complication, with long-term current use of insulin    Chronic bilateral low back pain with sciatica, sciatica laterality unspecified    Tobacco user    Insomnia due to medical condition    Other orders  -     Canagliflozin 100 MG tablet; Take 1 dose by mouth 2 (Two) Times a Day.      Discussed current health problems, Discussed DM, Insulin weight gain, Discussed trial of Invokana, agree would benefit from CGM, will check C-peptide. Discussed rechecking HgbA1c, goal 7 or less. Discussed F/U plan.        This document has been electronically signed by Chris Spencer MD

## 2018-02-05 LAB — C PEPTIDE SERPL-MCNC: 5.9 NG/ML (ref 1.1–4.4)

## 2018-02-05 RX ORDER — GABAPENTIN 800 MG/1
TABLET ORAL
Qty: 120 TABLET | Refills: 0 | OUTPATIENT
Start: 2018-02-05

## 2018-02-06 ENCOUNTER — TELEPHONE (OUTPATIENT)
Dept: FAMILY MEDICINE CLINIC | Facility: CLINIC | Age: 48
End: 2018-02-06

## 2018-02-06 NOTE — TELEPHONE ENCOUNTER
Pt did not receive RX for gabapentin on 2/2/18 visit. Documented in chart was called in, pharmacy states did not have it.  Called in RX 2/6/18 #[phone] with 3 RFs.  Spoke with Mary.

## 2018-02-06 NOTE — TELEPHONE ENCOUNTER
----- Message from Chris Spencer MD sent at 2/4/2018  4:42 PM CST -----  Hgba1c 8.1 goal is 7 or less, will F/U recheck 3 months after invokana

## 2018-02-08 ENCOUNTER — TELEPHONE (OUTPATIENT)
Dept: FAMILY MEDICINE CLINIC | Facility: CLINIC | Age: 48
End: 2018-02-08

## 2018-02-08 NOTE — TELEPHONE ENCOUNTER
----- Message from Chris Spencer MD sent at 2/7/2018  7:48 AM CST -----  C-Peptide is above  Normal, indicates Pancreatic beta cells still active. Will discuss at next visit.

## 2018-02-12 ENCOUNTER — PRIOR AUTHORIZATION (OUTPATIENT)
Dept: FAMILY MEDICINE CLINIC | Facility: CLINIC | Age: 48
End: 2018-02-12

## 2018-02-12 RX ORDER — SIMVASTATIN 40 MG
TABLET ORAL
Qty: 30 TABLET | Refills: 1 | Status: SHIPPED | OUTPATIENT
Start: 2018-02-12 | End: 2018-04-27 | Stop reason: SDUPTHER

## 2018-03-05 RX ORDER — DULOXETIN HYDROCHLORIDE 60 MG/1
CAPSULE, DELAYED RELEASE ORAL
Qty: 30 CAPSULE | Refills: 2 | Status: SHIPPED | OUTPATIENT
Start: 2018-03-05 | End: 2018-06-02 | Stop reason: SDUPTHER

## 2018-03-05 RX ORDER — LISINOPRIL 20 MG/1
TABLET ORAL
Qty: 30 TABLET | Refills: 3 | Status: SHIPPED | OUTPATIENT
Start: 2018-03-05 | End: 2018-07-03 | Stop reason: SDUPTHER

## 2018-03-30 ENCOUNTER — PRIOR AUTHORIZATION (OUTPATIENT)
Dept: FAMILY MEDICINE CLINIC | Facility: CLINIC | Age: 48
End: 2018-03-30

## 2018-04-27 RX ORDER — SIMVASTATIN 40 MG
TABLET ORAL
Qty: 30 TABLET | Refills: 0 | Status: SHIPPED | OUTPATIENT
Start: 2018-04-27 | End: 2018-06-01 | Stop reason: SDUPTHER

## 2018-05-02 ENCOUNTER — LAB (OUTPATIENT)
Dept: LAB | Facility: CLINIC | Age: 48
End: 2018-05-02

## 2018-05-02 ENCOUNTER — OFFICE VISIT (OUTPATIENT)
Dept: FAMILY MEDICINE CLINIC | Facility: CLINIC | Age: 48
End: 2018-05-02

## 2018-05-02 VITALS
DIASTOLIC BLOOD PRESSURE: 78 MMHG | WEIGHT: 243 LBS | OXYGEN SATURATION: 98 % | HEIGHT: 72 IN | HEART RATE: 111 BPM | BODY MASS INDEX: 32.91 KG/M2 | TEMPERATURE: 98.5 F | SYSTOLIC BLOOD PRESSURE: 124 MMHG

## 2018-05-02 DIAGNOSIS — F32.9 DEPRESSION, REACTIVE: ICD-10-CM

## 2018-05-02 DIAGNOSIS — I10 ESSENTIAL HYPERTENSION: ICD-10-CM

## 2018-05-02 DIAGNOSIS — Z79.899 HIGH RISK MEDICATION USE: ICD-10-CM

## 2018-05-02 DIAGNOSIS — M25.511 CHRONIC RIGHT SHOULDER PAIN: ICD-10-CM

## 2018-05-02 DIAGNOSIS — E11.69 TYPE 2 DIABETES MELLITUS WITH OTHER SPECIFIED COMPLICATION, WITH LONG-TERM CURRENT USE OF INSULIN (HCC): ICD-10-CM

## 2018-05-02 DIAGNOSIS — E66.9 NON MORBID OBESITY: ICD-10-CM

## 2018-05-02 DIAGNOSIS — G89.29 CHRONIC RIGHT SHOULDER PAIN: ICD-10-CM

## 2018-05-02 DIAGNOSIS — E11.69 TYPE 2 DIABETES MELLITUS WITH OTHER SPECIFIED COMPLICATION, WITH LONG-TERM CURRENT USE OF INSULIN (HCC): Primary | ICD-10-CM

## 2018-05-02 DIAGNOSIS — Z79.4 TYPE 2 DIABETES MELLITUS WITH OTHER SPECIFIED COMPLICATION, WITH LONG-TERM CURRENT USE OF INSULIN (HCC): Primary | ICD-10-CM

## 2018-05-02 DIAGNOSIS — Z79.4 TYPE 2 DIABETES MELLITUS WITH OTHER SPECIFIED COMPLICATION, WITH LONG-TERM CURRENT USE OF INSULIN (HCC): ICD-10-CM

## 2018-05-02 DIAGNOSIS — IMO0002 UNCONTROLLED TYPE 2 DIABETES MELLITUS WITH OTHER NEUROLOGIC COMPLICATION, WITH LONG-TERM CURRENT USE OF INSULIN: ICD-10-CM

## 2018-05-02 DIAGNOSIS — Z72.0 TOBACCO USER: ICD-10-CM

## 2018-05-02 PROCEDURE — 36415 COLL VENOUS BLD VENIPUNCTURE: CPT | Performed by: FAMILY MEDICINE

## 2018-05-02 PROCEDURE — 83036 HEMOGLOBIN GLYCOSYLATED A1C: CPT | Performed by: FAMILY MEDICINE

## 2018-05-02 PROCEDURE — 99214 OFFICE O/P EST MOD 30 MIN: CPT | Performed by: FAMILY MEDICINE

## 2018-05-02 RX ORDER — GABAPENTIN 800 MG/1
800 TABLET ORAL 4 TIMES DAILY
Qty: 120 TABLET | Refills: 3 | OUTPATIENT
Start: 2018-05-02 | End: 2018-07-23 | Stop reason: SDUPTHER

## 2018-05-02 NOTE — PROGRESS NOTES
Subjective   Mine Benedict is a 48 y.o. obese white male with uncontrolled DM II, HTN, Diabetic neuropathy, Depression, Chronic Low back pain H/O Lumbar Laminectomy, and other health problems. see PMH/PSH. Pt presents for exam, to discuss current health problems, Tx and F/U plan.   ' Blood sugars gradually improving. Concerned for weight gain. B/P has been good at checks'.    Diabetes   He has type 2 diabetes mellitus. No MedicAlert identification noted. The initial diagnosis of diabetes was made 25 years ago. Pertinent negatives for hypoglycemia include no confusion, dizziness, headaches, hunger, nervousness/anxiousness, pallor, seizures, sleepiness, speech difficulty, sweats or tremors. Associated symptoms include foot paresthesias. Pertinent negatives for diabetes include no blurred vision, no chest pain, no fatigue, no foot ulcerations, no polydipsia, no polyphagia, no polyuria, no visual change, no weakness and no weight loss. Hypoglycemia complications include nocturnal hypoglycemia. Pertinent negatives for hypoglycemia complications include no hospitalization, no required assistance and no required glucagon injection. Symptoms are stable. Diabetic complications include peripheral neuropathy and retinopathy. Pertinent negatives for diabetic complications include no CVA, heart disease, impotence, nephropathy or PVD. Risk factors for coronary artery disease include hypertension, obesity and tobacco exposure. Current diabetic treatment includes insulin injections. He is compliant with treatment all of the time. He is currently taking insulin pre-breakfast, pre-lunch, pre-dinner and at bedtime. Insulin injections are given by patient. Rotation sites for injection include the abdominal wall and thighs. His weight is decreasing steadily. He is following a generally healthy diet. Meal planning includes avoidance of concentrated sweets. He has not had a previous visit with a dietitian. He participates in exercise  daily. He monitors blood glucose at home 5+ x per day. He monitors urine at home <1 x per month. Blood glucose monitoring compliance is good. His home blood glucose trend is fluctuating minimally. His breakfast blood glucose is taken between 5-6 am. His breakfast blood glucose range is generally 70-90 mg/dl. His lunch blood glucose is taken between 12-1 pm. His lunch blood glucose range is generally 130-140 mg/dl. His dinner blood glucose is taken between 7-8 pm. His dinner blood glucose range is generally 130-140 mg/dl. His highest blood glucose is 180-200 mg/dl. His overall blood glucose range is 140-180 mg/dl. He does not see a podiatrist.Eye exam is not current.   Hypertension   This is a chronic problem. The current episode started more than 1 year ago. The problem is controlled. Pertinent negatives include no blurred vision, chest pain, headaches, palpitations, shortness of breath or sweats. Agents associated with hypertension include NSAIDs. Risk factors for coronary artery disease include smoking/tobacco exposure, stress, obesity, male gender and diabetes mellitus. Past treatments include ACE inhibitors. Current antihypertension treatment includes ACE inhibitors. The current treatment provides significant improvement. Hypertensive end-organ damage includes retinopathy. There is no history of CVA or PVD.   Upper Extremity Issue   This is a chronic problem. The current episode started more than 1 month ago. The problem occurs intermittently. The problem has been gradually worsening. Associated symptoms include arthralgias and myalgias. Pertinent negatives include no abdominal pain, chest pain, fatigue, fever, headaches, rash, visual change or weakness. Associated symptoms comments: R shoulder becomes painful at times when raising above chest. Exacerbated by: Use. He has tried acetaminophen and NSAIDs for the symptoms. The treatment provided mild relief.        The following portions of the patient's history  were reviewed and updated as appropriate: allergies, current medications, past family history, past medical history, past social history, past surgical history and problem list.    Review of Systems   Constitutional: Negative for fatigue, fever and weight loss.   HENT: Negative for dental problem, ear pain and postnasal drip.    Eyes: Negative for blurred vision, pain, discharge and visual disturbance.   Respiratory: Negative for apnea, shortness of breath and wheezing.    Cardiovascular: Negative for chest pain, palpitations and leg swelling.   Gastrointestinal: Negative for abdominal pain, blood in stool and constipation.   Endocrine: Negative for cold intolerance, heat intolerance, polydipsia, polyphagia and polyuria.   Genitourinary: Negative for discharge, dysuria and impotence.   Musculoskeletal: Positive for arthralgias, back pain and myalgias.   Skin: Negative for color change, pallor, rash and wound.   Allergic/Immunologic: Negative for environmental allergies and food allergies.   Neurological: Negative for dizziness, tremors, seizures, syncope, facial asymmetry, speech difficulty, weakness, light-headedness and headaches.   Psychiatric/Behavioral: Positive for decreased concentration and dysphoric mood. Negative for agitation, behavioral problems, confusion, hallucinations, self-injury and suicidal ideas. The patient is not nervous/anxious.        Objective   Physical Exam   Constitutional: He is oriented to person, place, and time. He appears well-developed and well-nourished. No distress.   HENT:   Head: Normocephalic and atraumatic.   Right Ear: External ear normal.   Left Ear: External ear normal.   Nose: Nose normal.   Mouth/Throat: Oropharynx is clear and moist.   Eyes: Conjunctivae and EOM are normal. Pupils are equal, round, and reactive to light. Right eye exhibits no discharge. Left eye exhibits no discharge. No scleral icterus.   Neck: Normal range of motion. No JVD present. No tracheal  deviation present. No thyromegaly present.   Cardiovascular: Normal rate, regular rhythm, normal heart sounds and intact distal pulses.  Exam reveals no gallop and no friction rub.    No murmur heard.  Pulmonary/Chest: Effort normal and breath sounds normal. No stridor. No respiratory distress. He has no wheezes. He has no rales. He exhibits no tenderness.   Abdominal: Soft. Bowel sounds are normal. He exhibits no distension and no mass. There is no tenderness. There is no rebound and no guarding. No hernia.   Musculoskeletal: He exhibits no edema, tenderness or deformity.   Has WB 4 with flexion , extension,  Rotation of low back. Has WB 6 with ROM R shoulder, possible impingement.    Mine had a diabetic foot exam performed today.   During the foot exam he had a monofilament test performed.    Neurological Sensory Findings -  Unaltered sharp/dull right ankle/foot discrimination and unaltered sharp/dull left ankle/foot discrimination.  Vascular Status -  His right foot exhibits abnormal foot vasculature . His left foot exhibits abnormal foot vasculature .  Skin Integrity  -  His right foot skin is not intact. He has callous right foot.  His left foot skin is not intact.He has callous left foot..   Foot Structure and Biomechanics -  His right foot exhibits hallux valgus.  His left foot exhibits hallux valgus.  Lymphadenopathy:     He has no cervical adenopathy.   Neurological: He is alert and oriented to person, place, and time. He has normal reflexes. He displays normal reflexes. No cranial nerve deficit. He exhibits normal muscle tone. Coordination normal.   Skin: Skin is warm and dry. No rash noted. He is not diaphoretic. No erythema. No pallor.   Psychiatric: His behavior is normal. Judgment and thought content normal.   Nursing note and vitals reviewed.      Assessment/Plan   Mine was seen today for diabetes, hypertension and shoulder problem.    Diagnoses and all orders for this visit:    Type 2 diabetes  mellitus with other specified complication, with long-term current use of insulin  -     Hemoglobin A1c; Future  -     gabapentin (NEURONTIN) 800 MG tablet; Take 1 tablet by mouth 4 (Four) Times a Day.    Essential hypertension    Non morbid obesity    Uncontrolled type 2 diabetes mellitus with other neurologic complication, with long-term current use of insulin    Chronic right shoulder pain    Tobacco user    Depression, reactive    High risk medication use    Other orders  -     Empagliflozin 25 MG tablet; Take 1 tablet by mouth Daily.      Discussed current health problem list, meds, indications, discussed increasing dose of Jardiance. Discussed smoking cessation. Discussed standards of care for DM. Discussed F/U here.  Patient's Body mass index is 32.96 kg/m². BMI is above normal parameters. Follow-up plan includes:  educational material, exercise counseling and referral to primary care.          This document has been electronically signed by Chris Spencer MD

## 2018-05-03 LAB — HBA1C MFR BLD: 7.8 % (ref 4–5.6)

## 2018-05-04 ENCOUNTER — TELEPHONE (OUTPATIENT)
Dept: FAMILY MEDICINE CLINIC | Facility: CLINIC | Age: 48
End: 2018-05-04

## 2018-05-04 NOTE — TELEPHONE ENCOUNTER
----- Message from Chris Spencer MD sent at 5/4/2018  7:42 AM CDT -----  HgbA1c improved 8.1 to 7.8. Will recheck in 3 months.

## 2018-05-27 DIAGNOSIS — IMO0002 UNCONTROLLED TYPE 2 DIABETES MELLITUS WITH OTHER NEUROLOGIC COMPLICATION, WITH LONG-TERM CURRENT USE OF INSULIN: ICD-10-CM

## 2018-05-29 RX ORDER — INSULIN GLULISINE 100 [IU]/ML
INJECTION, SOLUTION SUBCUTANEOUS
Qty: 15 ML | Refills: 3 | Status: SHIPPED | OUTPATIENT
Start: 2018-05-29 | End: 2018-10-10 | Stop reason: SDUPTHER

## 2018-06-01 DIAGNOSIS — IMO0002 UNCONTROLLED TYPE 2 DIABETES MELLITUS WITH OTHER NEUROLOGIC COMPLICATION, WITH LONG-TERM CURRENT USE OF INSULIN: ICD-10-CM

## 2018-06-01 RX ORDER — SIMVASTATIN 40 MG
TABLET ORAL
Qty: 30 TABLET | Refills: 0 | Status: SHIPPED | OUTPATIENT
Start: 2018-06-01 | End: 2018-06-30 | Stop reason: SDUPTHER

## 2018-06-01 RX ORDER — INSULIN GLARGINE 100 [IU]/ML
INJECTION, SOLUTION SUBCUTANEOUS
Qty: 15 ML | Refills: 3 | Status: SHIPPED | OUTPATIENT
Start: 2018-06-01 | End: 2018-10-10 | Stop reason: SDUPTHER

## 2018-06-03 DIAGNOSIS — E11.69 TYPE 2 DIABETES MELLITUS WITH OTHER SPECIFIED COMPLICATION, WITH LONG-TERM CURRENT USE OF INSULIN (HCC): ICD-10-CM

## 2018-06-03 DIAGNOSIS — Z79.4 TYPE 2 DIABETES MELLITUS WITH OTHER SPECIFIED COMPLICATION, WITH LONG-TERM CURRENT USE OF INSULIN (HCC): ICD-10-CM

## 2018-06-04 RX ORDER — DULOXETIN HYDROCHLORIDE 60 MG/1
CAPSULE, DELAYED RELEASE ORAL
Qty: 30 CAPSULE | Refills: 1 | Status: SHIPPED | OUTPATIENT
Start: 2018-06-04 | End: 2018-08-01 | Stop reason: SDUPTHER

## 2018-06-04 RX ORDER — GABAPENTIN 800 MG/1
TABLET ORAL
Qty: 120 TABLET | Refills: 2 | OUTPATIENT
Start: 2018-06-04

## 2018-06-05 DIAGNOSIS — E11.69 TYPE 2 DIABETES MELLITUS WITH OTHER SPECIFIED COMPLICATION, WITH LONG-TERM CURRENT USE OF INSULIN (HCC): ICD-10-CM

## 2018-06-05 DIAGNOSIS — Z79.4 TYPE 2 DIABETES MELLITUS WITH OTHER SPECIFIED COMPLICATION, WITH LONG-TERM CURRENT USE OF INSULIN (HCC): ICD-10-CM

## 2018-06-05 RX ORDER — GABAPENTIN 800 MG/1
TABLET ORAL
Qty: 120 TABLET | Refills: 2 | OUTPATIENT
Start: 2018-06-05

## 2018-06-06 ENCOUNTER — TELEPHONE (OUTPATIENT)
Dept: FAMILY MEDICINE CLINIC | Facility: CLINIC | Age: 48
End: 2018-06-06

## 2018-06-06 NOTE — TELEPHONE ENCOUNTER
Refill for gabapentin 800 mg one tablet four times daily #120 x 3 refills called to [facility] per verbal order Dr Spencer/LARA Chen.

## 2018-06-27 ENCOUNTER — OFFICE VISIT (OUTPATIENT)
Dept: FAMILY MEDICINE CLINIC | Facility: CLINIC | Age: 48
End: 2018-06-27

## 2018-06-27 VITALS
DIASTOLIC BLOOD PRESSURE: 72 MMHG | WEIGHT: 230.7 LBS | BODY MASS INDEX: 31.25 KG/M2 | HEART RATE: 88 BPM | SYSTOLIC BLOOD PRESSURE: 126 MMHG | HEIGHT: 72 IN | OXYGEN SATURATION: 99 % | TEMPERATURE: 98.1 F

## 2018-06-27 DIAGNOSIS — E11.69 TYPE 2 DIABETES MELLITUS WITH OTHER SPECIFIED COMPLICATION, WITH LONG-TERM CURRENT USE OF INSULIN (HCC): Primary | ICD-10-CM

## 2018-06-27 DIAGNOSIS — Z79.4 TYPE 2 DIABETES MELLITUS WITH OTHER SPECIFIED COMPLICATION, WITH LONG-TERM CURRENT USE OF INSULIN (HCC): Primary | ICD-10-CM

## 2018-06-27 DIAGNOSIS — K04.7 ABSCESSED TOOTH: ICD-10-CM

## 2018-06-27 DIAGNOSIS — I10 ESSENTIAL HYPERTENSION: ICD-10-CM

## 2018-06-27 PROCEDURE — 99214 OFFICE O/P EST MOD 30 MIN: CPT | Performed by: FAMILY MEDICINE

## 2018-06-27 RX ORDER — LANOLIN ALCOHOL/MO/W.PET/CERES
1000 CREAM (GRAM) TOPICAL 3 TIMES WEEKLY
COMMUNITY

## 2018-06-27 RX ORDER — HYDROCODONE BITARTRATE AND ACETAMINOPHEN 7.5; 325 MG/1; MG/1
1 TABLET ORAL EVERY 6 HOURS PRN
Qty: 9 TABLET | Refills: 0 | Status: SHIPPED | OUTPATIENT
Start: 2018-06-27 | End: 2018-09-19

## 2018-06-27 RX ORDER — AMOXICILLIN AND CLAVULANATE POTASSIUM 875; 125 MG/1; MG/1
1 TABLET, FILM COATED ORAL EVERY 12 HOURS SCHEDULED
Qty: 14 TABLET | Refills: 1 | Status: SHIPPED | OUTPATIENT
Start: 2018-06-27 | End: 2018-07-23

## 2018-06-27 NOTE — PROGRESS NOTES
Subjective   Mine Benedict is a 48 y.o. obese white male with uncontrolled DM II, HTN, Diabetic neuropathy, Depression, Chronic Low back pain H/O Lumbar Laminectomy, and other health problems. see PMH/PSH. Pt presents for exam, to discuss acute health problem, Tx and F/U plan.      ' L lower molar is  abcessed,  X  5 days,  Pain 8-10, OTC meds not helping, B/p has been up some with pain. Blood sugars also. Will be 1 week before Dentist can work in'.     Diabetes   He has type 2 diabetes mellitus. No MedicAlert identification noted. The initial diagnosis of diabetes was made 25 years ago. Pertinent negatives for hypoglycemia include no confusion, dizziness, hunger, nervousness/anxiousness, pallor, seizures, sleepiness, speech difficulty, sweats or tremors. Associated symptoms include foot paresthesias. Pertinent negatives for diabetes include no blurred vision, no foot ulcerations, no polydipsia, no polyphagia, no polyuria and no weight loss. Hypoglycemia complications include nocturnal hypoglycemia. Pertinent negatives for hypoglycemia complications include no hospitalization, no required assistance and no required glucagon injection. Symptoms are stable. Diabetic complications include peripheral neuropathy and retinopathy. Pertinent negatives for diabetic complications include no CVA, heart disease, impotence, nephropathy or PVD. Risk factors for coronary artery disease include hypertension, obesity and tobacco exposure. Current diabetic treatment includes insulin injections. He is compliant with treatment all of the time. He is currently taking insulin pre-breakfast, pre-lunch, pre-dinner and at bedtime. Insulin injections are given by patient. Rotation sites for injection include the abdominal wall and thighs. His weight is decreasing steadily. He is following a generally healthy diet. Meal planning includes avoidance of concentrated sweets. He has not had a previous visit with a dietitian. He participates in  exercise daily. He monitors blood glucose at home 5+ x per day. He monitors urine at home <1 x per month. Blood glucose monitoring compliance is good. His home blood glucose trend is fluctuating minimally. His breakfast blood glucose is taken between 5-6 am. His breakfast blood glucose range is generally 70-90 mg/dl. His lunch blood glucose is taken between 12-1 pm. His lunch blood glucose range is generally 130-140 mg/dl. His dinner blood glucose is taken between 7-8 pm. His dinner blood glucose range is generally 130-140 mg/dl. His overall blood glucose range is 140-180 mg/dl. He does not see a podiatrist.Eye exam is not current.   Hypertension   This is a chronic problem. The current episode started more than 1 year ago. The problem is controlled. Pertinent negatives include no blurred vision, palpitations, shortness of breath or sweats. Agents associated with hypertension include NSAIDs. Risk factors for coronary artery disease include smoking/tobacco exposure, stress, obesity, male gender and diabetes mellitus. Past treatments include ACE inhibitors. Current antihypertension treatment includes ACE inhibitors. The current treatment provides significant improvement. Hypertensive end-organ damage includes retinopathy. There is no history of CVA or PVD.   Dental Pain    This is a new problem. The current episode started in the past 7 days. The problem occurs constantly. The problem has been gradually worsening. The pain is at a severity of 8/10. The pain is moderate. Associated symptoms include facial pain. He has tried NSAIDs and acetaminophen for the symptoms. The treatment provided no relief.        The following portions of the patient's history were reviewed and updated as appropriate: allergies, current medications, past family history, past medical history, past social history, past surgical history and problem list.    Review of Systems   Constitutional: Negative for weight loss.   HENT: Positive for dental  problem. Negative for ear pain and postnasal drip.          L lower molar.   Eyes: Negative for blurred vision, pain, discharge and visual disturbance.   Respiratory: Negative for apnea, shortness of breath and wheezing.    Cardiovascular: Negative for palpitations and leg swelling.   Gastrointestinal: Negative for blood in stool and constipation.   Endocrine: Negative for cold intolerance, heat intolerance, polydipsia, polyphagia and polyuria.   Genitourinary: Negative for discharge, dysuria and impotence.   Musculoskeletal: Positive for back pain.   Skin: Negative for color change, pallor and wound.   Allergic/Immunologic: Negative for environmental allergies and food allergies.   Neurological: Negative for dizziness, tremors, seizures, syncope, facial asymmetry, speech difficulty and light-headedness.   Psychiatric/Behavioral: Positive for decreased concentration and dysphoric mood. Negative for agitation, behavioral problems, confusion, hallucinations, self-injury and suicidal ideas. The patient is not nervous/anxious.        Objective   Physical Exam   Constitutional: He is oriented to person, place, and time. He appears well-developed and well-nourished. No distress.   HENT:   Head: Normocephalic and atraumatic.   Right Ear: External ear normal.   Left Ear: External ear normal.   Nose: Nose normal.   Mouth/Throat: Oropharynx is clear and moist.   L lower rear gum inflamed   Eyes: Conjunctivae and EOM are normal. Pupils are equal, round, and reactive to light. Right eye exhibits no discharge. Left eye exhibits no discharge. No scleral icterus.   Neck: Normal range of motion. No JVD present. No tracheal deviation present. No thyromegaly present.   Cardiovascular: Normal rate, regular rhythm, normal heart sounds and intact distal pulses.  Exam reveals no gallop and no friction rub.    No murmur heard.  Pulmonary/Chest: Effort normal and breath sounds normal. No stridor. No respiratory distress. He has no wheezes.  He has no rales. He exhibits no tenderness.   Abdominal: Soft. Bowel sounds are normal. He exhibits no distension and no mass. There is no tenderness. There is no rebound and no guarding. No hernia.   Musculoskeletal: He exhibits no edema, tenderness or deformity.   Lymphadenopathy:     He has no cervical adenopathy.   Neurological: He is alert and oriented to person, place, and time. He has normal reflexes. He displays normal reflexes. No cranial nerve deficit. He exhibits normal muscle tone. Coordination normal.   Skin: Skin is warm and dry. No rash noted. He is not diaphoretic. No erythema. No pallor.   Psychiatric: His behavior is normal. Judgment and thought content normal.   Nursing note and vitals reviewed.      Assessment/Plan   Mine was seen today for dental pain and sore throat.    Diagnoses and all orders for this visit:    Type 2 diabetes mellitus with other specified complication, with long-term current use of insulin    Essential hypertension    Abscessed tooth    Other orders  -     amoxicillin-clavulanate (AUGMENTIN) 875-125 MG per tablet; Take 1 tablet by mouth Every 12 (Twelve) Hours.  -     HYDROcodone-acetaminophen (NORCO) 7.5-325 MG per tablet; Take 1 tablet by mouth Every 6 (Six) Hours As Needed for Moderate Pain .      Discussed current health problem list, tooth abscess, , HTN, DM, Tx plan , rationale, F/u with specialist. Discussed F/U here.          This document has been electronically signed by Chris Spencer MD

## 2018-07-03 RX ORDER — LISINOPRIL 20 MG/1
TABLET ORAL
Qty: 30 TABLET | Refills: 2 | Status: SHIPPED | OUTPATIENT
Start: 2018-07-03 | End: 2018-10-10 | Stop reason: SDUPTHER

## 2018-07-03 RX ORDER — SIMVASTATIN 40 MG
TABLET ORAL
Qty: 30 TABLET | Refills: 0 | Status: SHIPPED | OUTPATIENT
Start: 2018-07-03 | End: 2018-08-01 | Stop reason: SDUPTHER

## 2018-07-23 ENCOUNTER — OFFICE VISIT (OUTPATIENT)
Dept: FAMILY MEDICINE CLINIC | Facility: CLINIC | Age: 48
End: 2018-07-23

## 2018-07-23 VITALS
HEART RATE: 91 BPM | TEMPERATURE: 98.6 F | SYSTOLIC BLOOD PRESSURE: 132 MMHG | DIASTOLIC BLOOD PRESSURE: 70 MMHG | WEIGHT: 232.9 LBS | BODY MASS INDEX: 31.54 KG/M2 | HEIGHT: 72 IN | OXYGEN SATURATION: 99 %

## 2018-07-23 DIAGNOSIS — Z72.0 TOBACCO USER: ICD-10-CM

## 2018-07-23 DIAGNOSIS — IMO0002 UNCONTROLLED TYPE 2 DIABETES MELLITUS WITH OTHER NEUROLOGIC COMPLICATION, WITH LONG-TERM CURRENT USE OF INSULIN: Primary | ICD-10-CM

## 2018-07-23 DIAGNOSIS — Z79.4 TYPE 2 DIABETES MELLITUS WITH OTHER SPECIFIED COMPLICATION, WITH LONG-TERM CURRENT USE OF INSULIN (HCC): ICD-10-CM

## 2018-07-23 DIAGNOSIS — E66.9 NON MORBID OBESITY: ICD-10-CM

## 2018-07-23 DIAGNOSIS — I10 ESSENTIAL HYPERTENSION: ICD-10-CM

## 2018-07-23 DIAGNOSIS — K04.7 ABSCESSED TOOTH: ICD-10-CM

## 2018-07-23 DIAGNOSIS — E11.69 TYPE 2 DIABETES MELLITUS WITH OTHER SPECIFIED COMPLICATION, WITH LONG-TERM CURRENT USE OF INSULIN (HCC): ICD-10-CM

## 2018-07-23 DIAGNOSIS — M54.40 CHRONIC BILATERAL LOW BACK PAIN WITH SCIATICA, SCIATICA LATERALITY UNSPECIFIED: ICD-10-CM

## 2018-07-23 DIAGNOSIS — G89.29 CHRONIC BILATERAL LOW BACK PAIN WITH SCIATICA, SCIATICA LATERALITY UNSPECIFIED: ICD-10-CM

## 2018-07-23 DIAGNOSIS — G47.01 INSOMNIA DUE TO MEDICAL CONDITION: ICD-10-CM

## 2018-07-23 PROCEDURE — 99214 OFFICE O/P EST MOD 30 MIN: CPT | Performed by: FAMILY MEDICINE

## 2018-07-23 RX ORDER — QUETIAPINE FUMARATE 25 MG/1
25 TABLET, FILM COATED ORAL NIGHTLY
Qty: 30 TABLET | Refills: 2 | Status: SHIPPED | OUTPATIENT
Start: 2018-07-23 | End: 2018-10-20 | Stop reason: SDUPTHER

## 2018-07-23 RX ORDER — GABAPENTIN 800 MG/1
800 TABLET ORAL 4 TIMES DAILY
Qty: 120 TABLET | Refills: 3 | OUTPATIENT
Start: 2018-07-23 | End: 2018-07-23 | Stop reason: SDUPTHER

## 2018-07-23 RX ORDER — GABAPENTIN 800 MG/1
800 TABLET ORAL 4 TIMES DAILY
Qty: 120 TABLET | Refills: 3 | Status: SHIPPED | OUTPATIENT
Start: 2018-07-23 | End: 2018-09-19 | Stop reason: SDUPTHER

## 2018-07-23 NOTE — PROGRESS NOTES
Subjective    Mine Benedict is a 48 y.o. obese white male with uncontrolled DM II, HTN, Diabetic neuropathy, Depression, Chronic Low back pain H/O Lumbar Laminectomy, and other health problems. see PMH/PSH. Pt presents for exam, to discuss health problems, Tx and F/U plan.    ' Tooth infection resolved after Abx, have Dental appt scheduled for Aug. Have been going 2-3 days without sleep, mind racing , then sleeps some. B/P has been good at checks. FSBS have improved some'    Appt in [place], no infection. '     Diabetes   He has type 2 diabetes mellitus. No MedicAlert identification noted. The initial diagnosis of diabetes was made 25 years ago. Pertinent negatives for hypoglycemia include no confusion, dizziness, hunger, nervousness/anxiousness, pallor, seizures, sleepiness, speech difficulty, sweats or tremors. Associated symptoms include foot paresthesias. Pertinent negatives for diabetes include no blurred vision, no foot ulcerations, no polydipsia, no polyphagia, no polyuria and no weight loss. Hypoglycemia complications include nocturnal hypoglycemia. Pertinent negatives for hypoglycemia complications include no hospitalization, no required assistance and no required glucagon injection. Symptoms are stable. Diabetic complications include peripheral neuropathy and retinopathy. Pertinent negatives for diabetic complications include no CVA, heart disease, impotence, nephropathy or PVD. Risk factors for coronary artery disease include hypertension, obesity and tobacco exposure. Current diabetic treatment includes insulin injections. He is compliant with treatment all of the time. He is currently taking insulin pre-breakfast, pre-lunch, pre-dinner and at bedtime. Insulin injections are given by patient. Rotation sites for injection include the abdominal wall and thighs. His weight is decreasing steadily. He is following a generally healthy diet. Meal planning includes avoidance of concentrated sweets. He has not  had a previous visit with a dietitian. He participates in exercise daily. He monitors blood glucose at home 5+ x per day. He monitors urine at home <1 x per month. Blood glucose monitoring compliance is good. His home blood glucose trend is fluctuating minimally. His breakfast blood glucose is taken between 5-6 am. His breakfast blood glucose range is generally 70-90 mg/dl. His lunch blood glucose is taken between 12-1 pm. His lunch blood glucose range is generally 130-140 mg/dl. His dinner blood glucose is taken between 7-8 pm. His dinner blood glucose range is generally 130-140 mg/dl. His overall blood glucose range is 140-180 mg/dl. He does not see a podiatrist.Eye exam is not current.   Hypertension   This is a chronic problem. The current episode started more than 1 year ago. The problem is controlled. Pertinent negatives include no blurred vision, palpitations, shortness of breath or sweats. Agents associated with hypertension include NSAIDs. Risk factors for coronary artery disease include smoking/tobacco exposure, stress, obesity, male gender and diabetes mellitus. Past treatments include ACE inhibitors. Current antihypertension treatment includes ACE inhibitors. The current treatment provides significant improvement. Hypertensive end-organ damage includes retinopathy. There is no history of CVA or PVD.   Dental Pain    This is a new problem. The current episode started in the past 7 days. The problem occurs constantly. The problem has been gradually worsening. The pain is at a severity of 8/10. The pain is moderate. Associated symptoms include facial pain. He has tried NSAIDs and acetaminophen for the symptoms. The treatment provided no relief.   Insomnia   This is a recurrent problem. The current episode started more than 1 month ago. The problem occurs daily. The problem has been waxing and waning. Pertinent negatives include no chills. Associated symptoms comments: Insomnia, mind racing. . The symptoms  are aggravated by stress and smoking. He has tried relaxation for the symptoms. The treatment provided no relief.        The following portions of the patient's history were reviewed and updated as appropriate: allergies, current medications, past family history, past medical history, past social history, past surgical history and problem list.    Review of Systems   Constitutional: Negative.  Negative for activity change, chills and weight loss.   HENT: Positive for dental problem. Negative for ear pain and postnasal drip.          L lower molar abscess resolved.   Eyes: Negative for blurred vision, pain, discharge and visual disturbance.   Respiratory: Negative for apnea, shortness of breath and wheezing.    Cardiovascular: Negative for palpitations and leg swelling.   Gastrointestinal: Negative for blood in stool and constipation.   Endocrine: Negative for cold intolerance, heat intolerance, polydipsia, polyphagia and polyuria.   Genitourinary: Negative for discharge, dysuria and impotence.   Musculoskeletal: Positive for back pain.   Skin: Negative for color change, pallor and wound.   Allergic/Immunologic: Negative for environmental allergies and food allergies.   Neurological: Negative for dizziness, tremors, seizures, syncope, facial asymmetry, speech difficulty and light-headedness.   Psychiatric/Behavioral: Positive for decreased concentration and dysphoric mood. Negative for agitation, behavioral problems, confusion, hallucinations, self-injury and suicidal ideas. The patient has insomnia. The patient is not nervous/anxious.        Objective   Physical Exam   Constitutional: He is oriented to person, place, and time. He appears well-developed and well-nourished. No distress.   HENT:   Head: Normocephalic and atraumatic.   Right Ear: External ear normal.   Left Ear: External ear normal.   Nose: Nose normal.   Mouth/Throat: Oropharynx is clear and moist.   L lower rear gum inflamed   Eyes: Pupils are equal,  round, and reactive to light. Conjunctivae and EOM are normal. Right eye exhibits no discharge. Left eye exhibits no discharge. No scleral icterus.   Neck: Normal range of motion. No JVD present. No tracheal deviation present. No thyromegaly present.   Cardiovascular: Normal rate, regular rhythm, normal heart sounds and intact distal pulses.  Exam reveals no gallop and no friction rub.    No murmur heard.  Pulmonary/Chest: Effort normal and breath sounds normal. No stridor. No respiratory distress. He has no wheezes. He has no rales. He exhibits no tenderness.   Abdominal: Soft. Bowel sounds are normal. He exhibits no distension and no mass. There is no tenderness. There is no rebound and no guarding. No hernia.   Musculoskeletal: He exhibits no edema, tenderness or deformity.   Lymphadenopathy:     He has no cervical adenopathy.   Neurological: He is alert and oriented to person, place, and time. He has normal reflexes. He displays normal reflexes. No cranial nerve deficit. He exhibits normal muscle tone. Coordination normal.   Skin: Skin is warm and dry. No rash noted. He is not diaphoretic. No erythema. No pallor.   Psychiatric: His behavior is normal. Judgment and thought content normal.   Nursing note and vitals reviewed.      Assessment/Plan   Mine was seen today for hypertension, diabetes, sleeping problem and skin problem.    Diagnoses and all orders for this visit:    Uncontrolled type 2 diabetes mellitus with other neurologic complication, with long-term current use of insulin (CMS/[facility])  -     Hemoglobin A1c; Future    Insomnia due to medical condition    Type 2 diabetes mellitus with other specified complication, with long-term current use of insulin (CMS/[facility])  -     Discontinue: gabapentin (NEURONTIN) 800 MG tablet; Take 1 tablet by mouth 4 (Four) Times a Day.  -     gabapentin (NEURONTIN) 800 MG tablet; Take 1 tablet by mouth 4 (Four) Times a Day.    Essential hypertension    Non morbid  obesity    Chronic bilateral low back pain with sciatica, sciatica laterality unspecified    Abscessed tooth    Body mass index (bmi) 31.0-31.9, adult    Tobacco user    Other orders  -     QUEtiapine (SEROQUEL) 25 MG tablet; Take 1 tablet by mouth Every Night.      Discussed exam, current health problem list. Trial of Seroquel for Insomnia racing mind at night. Discussed standard of care for DM. Smoking cessation. Discussed F/U plan.    Patient's Body mass index is 31.59 kg/m². BMI is above normal parameters. Recommendations include: educational material, exercise counseling, nutrition counseling and referral to primary care.          This document has been electronically signed by Chris Spencer MD

## 2018-07-28 NOTE — PATIENT INSTRUCTIONS
Calorie Counting for Weight Loss  Calories are units of energy. Your body needs a certain amount of calories from food to keep you going throughout the day. When you eat more calories than your body needs, your body stores the extra calories as fat. When you eat fewer calories than your body needs, your body burns fat to get the energy it needs.  Calorie counting means keeping track of how many calories you eat and drink each day. Calorie counting can be helpful if you need to lose weight. If you make sure to eat fewer calories than your body needs, you should lose weight. Ask your health care provider what a healthy weight is for you.  For calorie counting to work, you will need to eat the right number of calories in a day in order to lose a healthy amount of weight per week. A dietitian can help you determine how many calories you need in a day and will give you suggestions on how to reach your calorie goal.  · A healthy amount of weight to lose per week is usually 1-2 lb (0.5-0.9 kg). This usually means that your daily calorie intake should be reduced by 500-750 calories.  · Eating 1,200 - 1,500 calories per day can help most women lose weight.  · Eating 1,500 - 1,800 calories per day can help most men lose weight.    What do I need to know about calorie counting?  In order to meet your daily calorie goal, you will need to:  · Find out how many calories are in each food you would like to eat. Try to do this before you eat.  · Decide how much of the food you plan to eat.  · Write down what you ate and how many calories it had. Doing this is called keeping a food log.    To successfully lose weight, it is important to balance calorie counting with a healthy lifestyle that includes regular activity. Aim for 150 minutes of moderate exercise (such as walking) or 75 minutes of vigorous exercise (such as running) each week.  Where do I find calorie information?    The number of calories in a food can be found on a  Nutrition Facts label. If a food does not have a Nutrition Facts label, try to look up the calories online or ask your dietitian for help.  Remember that calories are listed per serving. If you choose to have more than one serving of a food, you will have to multiply the calories per serving by the amount of servings you plan to eat. For example, the label on a package of bread might say that a serving size is 1 slice and that there are 90 calories in a serving. If you eat 1 slice, you will have eaten 90 calories. If you eat 2 slices, you will have eaten 180 calories.  How do I keep a food log?  Immediately after each meal, record the following information in your food log:  · What you ate. Don't forget to include toppings, sauces, and other extras on the food.  · How much you ate. This can be measured in cups, ounces, or number of items.  · How many calories each food and drink had.  · The total number of calories in the meal.    Keep your food log near you, such as in a small notebook in your pocket, or use a mobile arely or website. Some programs will calculate calories for you and show you how many calories you have left for the day to meet your goal.  What are some calorie counting tips?  · Use your calories on foods and drinks that will fill you up and not leave you hungry:  ? Some examples of foods that fill you up are nuts and nut butters, vegetables, lean proteins, and high-fiber foods like whole grains. High-fiber foods are foods with more than 5 g fiber per serving.  ? Drinks such as sodas, specialty coffee drinks, alcohol, and juices have a lot of calories, yet do not fill you up.  · Eat nutritious foods and avoid empty calories. Empty calories are calories you get from foods or beverages that do not have many vitamins or protein, such as candy, sweets, and soda. It is better to have a nutritious high-calorie food (such as an avocado) than a food with few nutrients (such as a bag of chips).  · Know how  "many calories are in the foods you eat most often. This will help you calculate calorie counts faster.  · Pay attention to calories in drinks. Low-calorie drinks include water and unsweetened drinks.  · Pay attention to nutrition labels for \"low fat\" or \"fat free\" foods. These foods sometimes have the same amount of calories or more calories than the full fat versions. They also often have added sugar, starch, or salt, to make up for flavor that was removed with the fat.  · Find a way of tracking calories that works for you. Get creative. Try different apps or programs if writing down calories does not work for you.  What are some portion control tips?  · Know how many calories are in a serving. This will help you know how many servings of a certain food you can have.  · Use a measuring cup to measure serving sizes. You could also try weighing out portions on a kitchen scale. With time, you will be able to estimate serving sizes for some foods.  · Take some time to put servings of different foods on your favorite plates, bowls, and cups so you know what a serving looks like.  · Try not to eat straight from a bag or box. Doing this can lead to overeating. Put the amount you would like to eat in a cup or on a plate to make sure you are eating the right portion.  · Use smaller plates, glasses, and bowls to prevent overeating.  · Try not to multitask (for example, watch TV or use your computer) while eating. If it is time to eat, sit down at a table and enjoy your food. This will help you to know when you are full. It will also help you to be aware of what you are eating and how much you are eating.  What are tips for following this plan?  Reading food labels  · Check the calorie count compared to the serving size. The serving size may be smaller than what you are used to eating.  · Check the source of the calories. Make sure the food you are eating is high in vitamins and protein and low in saturated and trans " fats.  Shopping  · Read nutrition labels while you shop. This will help you make healthy decisions before you decide to purchase your food.  · Make a grocery list and stick to it.  Cooking  · Try to cook your favorite foods in a healthier way. For example, try baking instead of frying.  · Use low-fat dairy products.  Meal planning  · Use more fruits and vegetables. Half of your plate should be fruits and vegetables.  · Include lean proteins like poultry and fish.  How do I count calories when eating out?  · Ask for smaller portion sizes.  · Consider sharing an entree and sides instead of getting your own entree.  · If you get your own entree, eat only half. Ask for a box at the beginning of your meal and put the rest of your entree in it so you are not tempted to eat it.  · If calories are listed on the menu, choose the lower calorie options.  · Choose dishes that include vegetables, fruits, whole grains, low-fat dairy products, and lean protein.  · Choose items that are boiled, broiled, grilled, or steamed. Stay away from items that are buttered, battered, fried, or served with cream sauce. Items labeled “crispy” are usually fried, unless stated otherwise.  · Choose water, low-fat milk, unsweetened iced tea, or other drinks without added sugar. If you want an alcoholic beverage, choose a lower calorie option such as a glass of wine or light beer.  · Ask for dressings, sauces, and syrups on the side. These are usually high in calories, so you should limit the amount you eat.  · If you want a salad, choose a garden salad and ask for grilled meats. Avoid extra toppings like rodriguez, cheese, or fried items. Ask for the dressing on the side, or ask for olive oil and vinegar or lemon to use as dressing.  · Estimate how many servings of a food you are given. For example, a serving of cooked rice is ½ cup or about the size of half a baseball. Knowing serving sizes will help you be aware of how much food you are eating at  restaurants. The list below tells you how big or small some common portion sizes are based on everyday objects:  ? 1 oz--4 stacked dice.  ? 3 oz--1 deck of cards.  ? 1 tsp--1 die.  ? 1 Tbsp--½ a ping-pong ball.  ? 2 Tbsp--1 ping-pong ball.  ? ½ cup--½ baseball.  ? 1 cup--1 baseball.  Summary  · Calorie counting means keeping track of how many calories you eat and drink each day. If you eat fewer calories than your body needs, you should lose weight.  · A healthy amount of weight to lose per week is usually 1-2 lb (0.5-0.9 kg). This usually means reducing your daily calorie intake by 500-750 calories.  · The number of calories in a food can be found on a Nutrition Facts label. If a food does not have a Nutrition Facts label, try to look up the calories online or ask your dietitian for help.  · Use your calories on foods and drinks that will fill you up, and not on foods and drinks that will leave you hungry.  · Use smaller plates, glasses, and bowls to prevent overeating.  This information is not intended to replace advice given to you by your health care provider. Make sure you discuss any questions you have with your health care provider.  Document Released: 12/18/2006 Document Revised: 11/17/2017 Document Reviewed: 11/17/2017  [employer] Interactive Patient Education © 2018 [employer]  Steps to Quit Smoking  Smoking tobacco can be bad for your health. It can also affect almost every organ in your body. Smoking puts you and people around you at risk for many serious long-lasting (chronic) diseases. Quitting smoking is hard, but it is one of the best things that you can do for your health. It is never too late to quit.  What are the benefits of quitting smoking?  When you quit smoking, you lower your risk for getting serious diseases and conditions. They can include:  · Lung cancer or lung disease.  · Heart disease.  · Stroke.  · Heart attack.  · Not being able to have children (infertility).  · Weak bones  (osteoporosis) and broken bones (fractures).    If you have coughing, wheezing, and shortness of breath, those symptoms may get better when you quit. You may also get sick less often. If you are pregnant, quitting smoking can help to lower your chances of having a baby of low birth weight.  What can I do to help me quit smoking?  Talk with your doctor about what can help you quit smoking. Some things you can do (strategies) include:  · Quitting smoking totally, instead of slowly cutting back how much you smoke over a period of time.  · Going to in-person counseling. You are more likely to quit if you go to many counseling sessions.  · Using resources and support systems, such as:  ? Online chats with a counselor.  ? Phone quitlines.  ? Printed self-help materials.  ? Support groups or group counseling.  ? Text messaging programs.  ? Mobile phone apps or applications.  · Taking medicines. Some of these medicines may have nicotine in them. If you are pregnant or breastfeeding, do not take any medicines to quit smoking unless your doctor says it is okay. Talk with your doctor about counseling or other things that can help you.    Talk with your doctor about using more than one strategy at the same time, such as taking medicines while you are also going to in-person counseling. This can help make quitting easier.  What things can I do to make it easier to quit?  Quitting smoking might feel very hard at first, but there is a lot that you can do to make it easier. Take these steps:  · Talk to your family and friends. Ask them to support and encourage you.  · Call phone quitlines, reach out to support groups, or work with a counselor.  · Ask people who smoke to not smoke around you.  · Avoid places that make you want (trigger) to smoke, such as:  ? Bars.  ? Parties.  ? Smoke-break areas at work.  · Spend time with people who do not smoke.  · Lower the stress in your life. Stress can make you want to smoke. Try these things  to help your stress:  ? Getting regular exercise.  ? Deep-breathing exercises.  ? Yoga.  ? Meditating.  ? Doing a body scan. To do this, close your eyes, focus on one area of your body at a time from head to toe, and notice which parts of your body are tense. Try to relax the muscles in those areas.  · Download or buy apps on your mobile phone or tablet that can help you stick to your quit plan. There are many free apps, such as QuitGuide from the CDC (Centers for Disease Control and Prevention). You can find more support from smokefree.gov and other websites.    This information is not intended to replace advice given to you by your health care provider. Make sure you discuss any questions you have with your health care provider.  Document Released: 10/14/2010 Document Revised: 08/15/2017 Document Reviewed: 05/03/2016  [employer] Interactive Patient Education © 2018 [employer]  Diabetes Mellitus and Standards of Medical Care  Managing diabetes (diabetes mellitus) can be complicated. Your diabetes treatment may be managed by a team of health care providers, including:  · A diet and nutrition specialist (registered dietitian).  · A nurse.  · A certified diabetes educator (CDE).  · A diabetes specialist (endocrinologist).  · An eye doctor.  · A primary care provider.  · A dentist.    Your health care providers follow a schedule in order to help you get the best quality of care. The following schedule is a general guideline for your diabetes management plan. Your health care providers may also give you more specific instructions.  HbA1c (  hemoglobin A1c) test  This test provides information about blood sugar (glucose) control over the previous 2-3 months. It is used to check whether your diabetes management plan needs to be adjusted.  · If you are meeting your treatment goals, this test is done at least 2 times a year.  · If you are not meeting treatment goals or if your treatment goals have changed, this test is  done 4 times a year.    Blood pressure test  · This test is done at every routine medical visit. For most people, the goal is less than 130/80. Ask your health care provider what your goal blood pressure should be.  Dental and eye exams  · Visit your dentist two times a year.  · If you have type 1 diabetes, get an eye exam 3-5 years after you are diagnosed, and then once a year after your first exam.  ? If you were diagnosed with type 1 diabetes as a child, get an eye exam when you are age 10 or older and have had diabetes for 3-5 years. After the first exam, you should get an eye exam once a year.  · If you have type 2 diabetes, have an eye exam as soon as you are diagnosed, and then once a year after your first exam.  Foot care exam  · Visual foot exams are done at every routine medical visit. The exams check for cuts, bruises, redness, blisters, sores, or other problems with the feet.  · A complete foot exam is done by your health care provider once a year. This exam includes an inspection of the structure and skin of your feet, and a check of the pulses and sensation in your feet.  ? Type 1 diabetes: Get your first exam 3-5 years after diagnosis.  ? Type 2 diabetes: Get your first exam as soon as you are diagnosed.  · Check your feet every day for cuts, bruises, redness, blisters, or sores. If you have any of these or other problems that are not healing, contact your health care provider.  Kidney function test (  urine microalbumin)  · This test is done once a year.  ? Type 1 diabetes: Get your first test 5 years after diagnosis.  ? Type 2 diabetes: Get your first test as soon as you are diagnosed.  · If you have chronic kidney disease (CKD), get a serum creatinine and estimated glomerular filtration rate (eGFR) test once a year.  Lipid profile (cholesterol, HDL, LDL, triglycerides)  · This test should be done when you are diagnosed with diabetes, and every 5 years after the first test. If you are on medicines  to lower your cholesterol, you may need to get this test done every year.  ? The goal for LDL is less than 100 mg/dL (5.5 mmol/L). If you are at high risk, the goal is less than 70 mg/dL (3.9 mmol/L).  ? The goal for HDL is 40 mg/dL (2.2 mmol/L) for men and 50 mg/dL(2.8 mmol/L) for women. An HDL cholesterol of 60 mg/dL (3.3 mmol/L) or higher gives some protection against heart disease.  ? The goal for triglycerides is less than 150 mg/dL (8.3 mmol/L).  Immunizations  · The yearly flu (influenza) vaccine is recommended for everyone 6 months or older who has diabetes.  · The pneumonia (pneumococcal) vaccine is recommended for everyone 2 years or older who has diabetes. If you are 65 or older, you may get the pneumonia vaccine as a series of two separate shots.  · The hepatitis B vaccine is recommended for adults shortly after they have been diagnosed with diabetes.  · The Tdap (tetanus, diphtheria, and pertussis) vaccine should be given:  ? According to normal childhood vaccination schedules, for children.  ? Every 10 years, for adults who have diabetes.  · The shingles vaccine is recommended for people who have had chicken pox and are 50 years or older.  Mental and emotional health  · Screening for symptoms of eating disorders, anxiety, and depression is recommended at the time of diagnosis and afterward as needed. If your screening shows that you have symptoms (you have a positive screening result), you may need further evaluation and be referred to a mental health care provider.  Diabetes self-management education  · Education about how to manage your diabetes is recommended at diagnosis and ongoing as needed.  Treatment plan  · Your treatment plan will be reviewed at every medical visit.  Summary  · Managing diabetes (diabetes mellitus) can be complicated. Your diabetes treatment may be managed by a team of health care providers.  · Your health care providers follow a schedule in order to help you get the best  quality of care.  · Standards of care including having regular physical exams, blood tests, blood pressure monitoring, immunizations, screening tests, and education about how to manage your diabetes.  · Your health care providers may also give you more specific instructions based on your individual health.  This information is not intended to replace advice given to you by your health care provider. Make sure you discuss any questions you have with your health care provider.  Document Released: 10/15/2010 Document Revised: 09/15/2017 Document Reviewed: 09/15/2017  [employer] Interactive Patient Education © 2018 [employer]

## 2018-08-01 RX ORDER — SIMVASTATIN 40 MG
TABLET ORAL
Qty: 30 TABLET | Refills: 5 | Status: SHIPPED | OUTPATIENT
Start: 2018-08-01 | End: 2019-01-15 | Stop reason: SDUPTHER

## 2018-08-01 RX ORDER — DULOXETIN HYDROCHLORIDE 60 MG/1
CAPSULE, DELAYED RELEASE ORAL
Qty: 30 CAPSULE | Refills: 2 | Status: SHIPPED | OUTPATIENT
Start: 2018-08-01 | End: 2018-10-31 | Stop reason: SDUPTHER

## 2018-08-01 NOTE — TELEPHONE ENCOUNTER
Received information regarding pt insulin from [employer], Apidra is no longer covered/formulary, it has a higher copay, by pt insurance.  Admelog or admelog solostar is recommended by pt insurance now.  Would you like to change or have PA submitted?

## 2018-08-01 NOTE — TELEPHONE ENCOUNTER
RX needs resent please, there was an error with previous RX, went to pharmacy as humalog, not admelog.

## 2018-09-19 ENCOUNTER — OFFICE VISIT (OUTPATIENT)
Dept: FAMILY MEDICINE CLINIC | Facility: CLINIC | Age: 48
End: 2018-09-19

## 2018-09-19 ENCOUNTER — LAB (OUTPATIENT)
Dept: LAB | Facility: HOSPITAL | Age: 48
End: 2018-09-19

## 2018-09-19 VITALS
BODY MASS INDEX: 32.1 KG/M2 | SYSTOLIC BLOOD PRESSURE: 124 MMHG | OXYGEN SATURATION: 100 % | HEART RATE: 65 BPM | TEMPERATURE: 98.4 F | WEIGHT: 237 LBS | DIASTOLIC BLOOD PRESSURE: 66 MMHG | HEIGHT: 72 IN

## 2018-09-19 DIAGNOSIS — E11.49 OTHER DIABETIC NEUROLOGICAL COMPLICATION ASSOCIATED WITH TYPE 2 DIABETES MELLITUS (HCC): Primary | ICD-10-CM

## 2018-09-19 DIAGNOSIS — Z72.0 TOBACCO USER: ICD-10-CM

## 2018-09-19 DIAGNOSIS — M79.2 NEUROPATHIC PAIN: ICD-10-CM

## 2018-09-19 DIAGNOSIS — M54.40 CHRONIC BILATERAL LOW BACK PAIN WITH SCIATICA, SCIATICA LATERALITY UNSPECIFIED: ICD-10-CM

## 2018-09-19 DIAGNOSIS — I10 ESSENTIAL HYPERTENSION: ICD-10-CM

## 2018-09-19 DIAGNOSIS — E66.9 NON MORBID OBESITY: ICD-10-CM

## 2018-09-19 DIAGNOSIS — IMO0002 UNCONTROLLED TYPE 2 DIABETES MELLITUS WITH OTHER NEUROLOGIC COMPLICATION, WITH LONG-TERM CURRENT USE OF INSULIN: ICD-10-CM

## 2018-09-19 DIAGNOSIS — G89.29 CHRONIC BILATERAL LOW BACK PAIN WITH SCIATICA, SCIATICA LATERALITY UNSPECIFIED: ICD-10-CM

## 2018-09-19 DIAGNOSIS — Z79.4 TYPE 2 DIABETES MELLITUS WITH OTHER SPECIFIED COMPLICATION, WITH LONG-TERM CURRENT USE OF INSULIN (HCC): ICD-10-CM

## 2018-09-19 DIAGNOSIS — E11.69 TYPE 2 DIABETES MELLITUS WITH OTHER SPECIFIED COMPLICATION, WITH LONG-TERM CURRENT USE OF INSULIN (HCC): ICD-10-CM

## 2018-09-19 DIAGNOSIS — F33.0 MILD EPISODE OF RECURRENT MAJOR DEPRESSIVE DISORDER (HCC): ICD-10-CM

## 2018-09-19 PROBLEM — E11.40 DIABETIC NEUROPATHY: Status: ACTIVE | Noted: 2018-09-19

## 2018-09-19 LAB — HBA1C MFR BLD: 8.5 % (ref 4–5.6)

## 2018-09-19 PROCEDURE — 83036 HEMOGLOBIN GLYCOSYLATED A1C: CPT

## 2018-09-19 PROCEDURE — 99214 OFFICE O/P EST MOD 30 MIN: CPT | Performed by: FAMILY MEDICINE

## 2018-09-19 RX ORDER — GABAPENTIN 800 MG/1
800 TABLET ORAL 4 TIMES DAILY
Qty: 120 TABLET | Refills: 3 | Status: SHIPPED | OUTPATIENT
Start: 2018-09-19 | End: 2018-12-17 | Stop reason: SDUPTHER

## 2018-09-22 NOTE — PROGRESS NOTES
Subjective    Mine Benedict is a 48 y.o. obese white male with uncontrolled DM II, HTN, Diabetic neuropathy, Depression, Chronic Low back pain H/O Lumbar Laminectomy, and other health problems. see PMH/PSH. Pt presents for exam, to discuss health problems, Tx and F/U plan.  'B/P has been good at checks. Neuropathic pain in legs worse, Neurontin does not provide as much relief as in past, pain 6/10 most of time with med. Low back pain remainsat 6-8/10 most days. Depression controlled. Bad teeth are not hurting currently. Sleep is fair'.    Diabetes   He has type 2 diabetes mellitus. No MedicAlert identification noted. The initial diagnosis of diabetes was made 25 years ago. Pertinent negatives for hypoglycemia include no confusion, dizziness, hunger, nervousness/anxiousness, pallor, seizures, sleepiness, speech difficulty, sweats or tremors. Associated symptoms include foot paresthesias. Pertinent negatives for diabetes include no blurred vision, no foot ulcerations, no polydipsia, no polyphagia, no polyuria and no weight loss. Hypoglycemia complications include nocturnal hypoglycemia. Pertinent negatives for hypoglycemia complications include no hospitalization, no required assistance and no required glucagon injection. Symptoms are stable. Diabetic complications include peripheral neuropathy and retinopathy. Pertinent negatives for diabetic complications include no CVA, heart disease, impotence, nephropathy or PVD. Risk factors for coronary artery disease include hypertension, obesity and tobacco exposure. Current diabetic treatment includes insulin injections. He is compliant with treatment all of the time. He is currently taking insulin pre-breakfast, pre-lunch, pre-dinner and at bedtime. Insulin injections are given by patient. Rotation sites for injection include the abdominal wall and thighs. His weight is decreasing steadily. He is following a generally healthy diet. Meal planning includes avoidance of  concentrated sweets. He has not had a previous visit with a dietitian. He participates in exercise daily. He monitors blood glucose at home 5+ x per day. He monitors urine at home <1 x per month. Blood glucose monitoring compliance is good. His home blood glucose trend is fluctuating minimally. His breakfast blood glucose is taken between 5-6 am. His breakfast blood glucose range is generally 70-90 mg/dl. His lunch blood glucose is taken between 12-1 pm. His lunch blood glucose range is generally 130-140 mg/dl. His dinner blood glucose is taken between 7-8 pm. His dinner blood glucose range is generally 130-140 mg/dl. His overall blood glucose range is 140-180 mg/dl. He does not see a podiatrist.Eye exam is not current.   Hypertension   This is a chronic problem. The current episode started more than 1 year ago. The problem is controlled. Pertinent negatives include no blurred vision, palpitations, shortness of breath or sweats. Agents associated with hypertension include NSAIDs. Risk factors for coronary artery disease include smoking/tobacco exposure, stress, obesity, male gender and diabetes mellitus. Past treatments include ACE inhibitors. Current antihypertension treatment includes ACE inhibitors. The current treatment provides significant improvement. Hypertensive end-organ damage includes retinopathy. There is no history of CVA or PVD.   Insomnia   This is a recurrent problem. The current episode started more than 1 month ago. The problem occurs daily. The problem has been waxing and waning. Pertinent negatives include no chills. Associated symptoms comments: Insomnia, mind racing. . The symptoms are aggravated by stress and smoking. He has tried relaxation (Doxepin) for the symptoms. The treatment provided mild relief.   Dental Pain    This is a new problem. The current episode started more than 1 month ago. The problem has been resolved. The pain is at a severity of 0/10. The patient is experiencing no  pain. He has tried NSAIDs and acetaminophen (Abx) for the symptoms. The treatment provided significant relief.   Depression   Visit Type: follow-up  Patient presents with the following symptoms: decreased concentration, depressed mood and insomnia.  Patient is not experiencing: confusion, impotence, nervousness/anxiety, palpitations, shortness of breath, suicidal ideas and weight loss.  Frequency of symptoms: occasionally   Severity: moderate   Sleep quality: fair  Compliance with medications:  51-75%        Neurologic Problem   The patient's pertinent negatives include no syncope. Primary symptoms comment: Diabetic Neuropathy. This is a chronic problem. The current episode started more than 1 year ago. The neurological problem developed gradually. The problem has been gradually worsening since onset. Associated symptoms include back pain. Pertinent negatives include no confusion, dizziness, light-headedness, palpitations or shortness of breath. Treatments tried: Neurontin, Cymbalta. The treatment provided moderate relief.        The following portions of the patient's history were reviewed and updated as appropriate: allergies, current medications, past family history, past medical history, past social history, past surgical history and problem list.    Review of Systems   Constitutional: Negative.  Negative for activity change, chills and weight loss.   HENT: Negative for dental problem, ear pain and postnasal drip.          L lower molar abscess resolved.   Eyes: Negative for blurred vision, pain, discharge and visual disturbance.   Respiratory: Negative for apnea, shortness of breath and wheezing.    Cardiovascular: Negative for palpitations and leg swelling.   Gastrointestinal: Negative for blood in stool and constipation.   Endocrine: Negative for cold intolerance, heat intolerance, polydipsia, polyphagia and polyuria.   Genitourinary: Negative for discharge, dysuria and impotence.   Musculoskeletal: Positive  for back pain.   Skin: Negative for color change, pallor and wound.   Allergic/Immunologic: Negative for environmental allergies and food allergies.   Neurological: Negative for dizziness, tremors, seizures, syncope, facial asymmetry, speech difficulty and light-headedness.        Diabetic neuropathy   Psychiatric/Behavioral: Positive for decreased concentration, dysphoric mood and sleep disturbance. Negative for agitation, behavioral problems, confusion, hallucinations, self-injury and suicidal ideas. The patient has insomnia. The patient is not nervous/anxious.        Objective   Physical Exam   Constitutional: He is oriented to person, place, and time. He appears well-developed and well-nourished. No distress.   HENT:   Head: Normocephalic and atraumatic.   Right Ear: External ear normal.   Left Ear: External ear normal.   Nose: Nose normal.   Mouth/Throat: Oropharynx is clear and moist.   Eyes: Pupils are equal, round, and reactive to light. Conjunctivae and EOM are normal. Right eye exhibits no discharge. Left eye exhibits no discharge. No scleral icterus.   Neck: Normal range of motion. No JVD present. No tracheal deviation present. No thyromegaly present.   Cardiovascular: Normal rate, regular rhythm, normal heart sounds and intact distal pulses.  Exam reveals no gallop and no friction rub.    No murmur heard.  Pulmonary/Chest: Effort normal and breath sounds normal. No stridor. No respiratory distress. He has no wheezes. He has no rales. He exhibits no tenderness.   Abdominal: Soft. Bowel sounds are normal. He exhibits no distension and no mass. There is no tenderness. There is no rebound and no guarding. No hernia.   Musculoskeletal: He exhibits no edema, tenderness or deformity.   Lymphadenopathy:     He has no cervical adenopathy.   Neurological: He is alert and oriented to person, place, and time. He has normal reflexes. He displays normal reflexes. No cranial nerve deficit. He exhibits normal muscle  tone. Coordination normal.   Skin: Skin is warm and dry. No rash noted. He is not diaphoretic. No erythema. No pallor.   Psychiatric: His behavior is normal. Judgment and thought content normal.   Nursing note and vitals reviewed.      Assessment/Plan   Mine was seen today for diabetes, hypertension and insomnia.    Diagnoses and all orders for this visit:    Other diabetic neurological complication associated with type 2 diabetes mellitus (CMS/[facility])  -     Ambulatory Referral to Neurology    Type 2 diabetes mellitus with other specified complication, with long-term current use of insulin (CMS/[facility])  -     gabapentin (NEURONTIN) 800 MG tablet; Take 1 tablet by mouth 4 (Four) Times a Day.    Neuropathic pain    Essential hypertension    Non morbid obesity    Uncontrolled type 2 diabetes mellitus with other neurologic complication, with long-term current use of insulin (CMS/[facility])    Chronic bilateral low back pain with sciatica, sciatica laterality unspecified    Mild episode of recurrent major depressive disorder (CMS/[facility])    Tobacco user      Discussed current health problems, meds, indications. Tx plan, rationale. Discussed Neurontin as controlled med, safety with controlled med. Discussed referral to Neurology for eval of Neuropathic pain. Discussed standards of care for DMII, goal HgbA1c of 7, compliance with Tx. Discussed smokinc cessation x 3 mins. Discussed BMI, BEE diet, exercise. Discussed F/U here.          This document has been electronically signed by Chris Spencer MD

## 2018-09-26 ENCOUNTER — TELEPHONE (OUTPATIENT)
Dept: FAMILY MEDICINE CLINIC | Facility: CLINIC | Age: 48
End: 2018-09-26

## 2018-09-26 NOTE — TELEPHONE ENCOUNTER
----- Message from Chris Spencer MD sent at 9/19/2018  4:11 PM CDT -----  Blood sugars have gotten worse HgbA1c 8.5  Goal is 7 or less, make sure to let us know if any problems getting meds or insulin, can refer to Specialist , will recheck 3 months.

## 2018-10-10 RX ORDER — LISINOPRIL 20 MG/1
TABLET ORAL
Qty: 30 TABLET | Refills: 1 | Status: SHIPPED | OUTPATIENT
Start: 2018-10-10 | End: 2018-12-16 | Stop reason: SDUPTHER

## 2018-10-10 RX ORDER — INSULIN GLARGINE 100 [IU]/ML
INJECTION, SOLUTION SUBCUTANEOUS
Qty: 15 ML | Refills: 2 | Status: SHIPPED | OUTPATIENT
Start: 2018-10-10 | End: 2019-01-16 | Stop reason: SDUPTHER

## 2018-10-10 RX ORDER — INSULIN GLULISINE 100 [IU]/ML
INJECTION, SOLUTION SUBCUTANEOUS
Qty: 15 ML | Refills: 2 | Status: SHIPPED | OUTPATIENT
Start: 2018-10-10 | End: 2019-03-22 | Stop reason: ALTCHOICE

## 2018-10-22 RX ORDER — QUETIAPINE FUMARATE 25 MG/1
TABLET, FILM COATED ORAL
Qty: 30 TABLET | Refills: 1 | Status: SHIPPED | OUTPATIENT
Start: 2018-10-22 | End: 2018-12-16 | Stop reason: SDUPTHER

## 2018-10-31 RX ORDER — DULOXETIN HYDROCHLORIDE 60 MG/1
CAPSULE, DELAYED RELEASE ORAL
Qty: 30 CAPSULE | Refills: 1 | Status: SHIPPED | OUTPATIENT
Start: 2018-10-31 | End: 2018-12-03 | Stop reason: SDUPTHER

## 2018-11-21 ENCOUNTER — TELEPHONE (OUTPATIENT)
Dept: FAMILY MEDICINE CLINIC | Facility: CLINIC | Age: 48
End: 2018-11-21

## 2018-11-21 RX ORDER — AMOXICILLIN AND CLAVULANATE POTASSIUM 875; 125 MG/1; MG/1
1 TABLET, FILM COATED ORAL 2 TIMES DAILY
Qty: 20 TABLET | Refills: 0 | Status: SHIPPED | OUTPATIENT
Start: 2018-11-21 | End: 2018-12-17

## 2018-11-21 NOTE — TELEPHONE ENCOUNTER
Pt would like to know if could get an antibiotic sent in for him, his tooth is getting infected again.  Was given augmentin last time.

## 2018-12-03 RX ORDER — DULOXETIN HYDROCHLORIDE 60 MG/1
60 CAPSULE, DELAYED RELEASE ORAL DAILY
Qty: 30 CAPSULE | Refills: 5 | Status: SHIPPED | OUTPATIENT
Start: 2018-12-03 | End: 2019-06-24 | Stop reason: SDUPTHER

## 2018-12-17 ENCOUNTER — OFFICE VISIT (OUTPATIENT)
Dept: FAMILY MEDICINE CLINIC | Facility: CLINIC | Age: 48
End: 2018-12-17

## 2018-12-17 VITALS
SYSTOLIC BLOOD PRESSURE: 128 MMHG | WEIGHT: 247.5 LBS | BODY MASS INDEX: 33.52 KG/M2 | OXYGEN SATURATION: 99 % | HEIGHT: 72 IN | DIASTOLIC BLOOD PRESSURE: 60 MMHG | TEMPERATURE: 98.4 F | HEART RATE: 78 BPM

## 2018-12-17 DIAGNOSIS — E11.65 UNCONTROLLED TYPE 2 DIABETES MELLITUS WITH HYPERGLYCEMIA (HCC): Primary | ICD-10-CM

## 2018-12-17 DIAGNOSIS — G47.01 INSOMNIA DUE TO MEDICAL CONDITION: ICD-10-CM

## 2018-12-17 DIAGNOSIS — E11.42 DIABETIC POLYNEUROPATHY ASSOCIATED WITH TYPE 2 DIABETES MELLITUS (HCC): ICD-10-CM

## 2018-12-17 DIAGNOSIS — E66.9 NON MORBID OBESITY: ICD-10-CM

## 2018-12-17 DIAGNOSIS — E11.69 TYPE 2 DIABETES MELLITUS WITH OTHER SPECIFIED COMPLICATION, WITH LONG-TERM CURRENT USE OF INSULIN (HCC): ICD-10-CM

## 2018-12-17 DIAGNOSIS — Z23 NEED FOR IMMUNIZATION AGAINST INFLUENZA: ICD-10-CM

## 2018-12-17 DIAGNOSIS — M54.40 CHRONIC BILATERAL LOW BACK PAIN WITH SCIATICA, SCIATICA LATERALITY UNSPECIFIED: ICD-10-CM

## 2018-12-17 DIAGNOSIS — G89.29 CHRONIC BILATERAL LOW BACK PAIN WITH SCIATICA, SCIATICA LATERALITY UNSPECIFIED: ICD-10-CM

## 2018-12-17 DIAGNOSIS — I10 ESSENTIAL HYPERTENSION: ICD-10-CM

## 2018-12-17 DIAGNOSIS — Z79.4 TYPE 2 DIABETES MELLITUS WITH OTHER SPECIFIED COMPLICATION, WITH LONG-TERM CURRENT USE OF INSULIN (HCC): ICD-10-CM

## 2018-12-17 PROCEDURE — 90674 CCIIV4 VAC NO PRSV 0.5 ML IM: CPT | Performed by: FAMILY MEDICINE

## 2018-12-17 PROCEDURE — 90471 IMMUNIZATION ADMIN: CPT | Performed by: FAMILY MEDICINE

## 2018-12-17 PROCEDURE — 99214 OFFICE O/P EST MOD 30 MIN: CPT | Performed by: FAMILY MEDICINE

## 2018-12-17 RX ORDER — GABAPENTIN 800 MG/1
800 TABLET ORAL 4 TIMES DAILY
Qty: 120 TABLET | Refills: 3 | Status: SHIPPED | OUTPATIENT
Start: 2018-12-17 | End: 2019-03-22 | Stop reason: SDUPTHER

## 2018-12-17 RX ORDER — LISINOPRIL 20 MG/1
TABLET ORAL
Qty: 30 TABLET | Refills: 0 | Status: SHIPPED | OUTPATIENT
Start: 2018-12-17 | End: 2019-01-15 | Stop reason: SDUPTHER

## 2018-12-17 RX ORDER — QUETIAPINE FUMARATE 25 MG/1
TABLET, FILM COATED ORAL
Qty: 30 TABLET | Refills: 0 | Status: SHIPPED | OUTPATIENT
Start: 2018-12-17 | End: 2019-01-15 | Stop reason: SDUPTHER

## 2018-12-17 NOTE — PROGRESS NOTES
Subjective    Mine Benedict is a 48 y.o. obese white male with uncontrolled DM II, HTN, Diabetic neuropathy, Depression, Chronic Low back pain H/O Lumbar Laminectomy, and other health problems, see PMH/PSH. Pt presents for exam, to discuss health problems, Tx and F/U plan.    ' Glucose meter average is 190. B/P controlled. Neurontin helps with pain. Low back 6-8/10 most days. Depression stable. Have not had tooth fixed , but not infected currently'.    Diabetes   He has type 2 diabetes mellitus. No MedicAlert identification noted. The initial diagnosis of diabetes was made 25 years ago. Pertinent negatives for hypoglycemia include no confusion, dizziness, hunger, nervousness/anxiousness, pallor, seizures, sleepiness, speech difficulty, sweats or tremors. Associated symptoms include foot paresthesias. Pertinent negatives for diabetes include no blurred vision, no foot ulcerations, no polydipsia, no polyphagia, no polyuria and no weight loss. Hypoglycemia complications include nocturnal hypoglycemia. Pertinent negatives for hypoglycemia complications include no hospitalization, no required assistance and no required glucagon injection. Symptoms are stable. Diabetic complications include peripheral neuropathy and retinopathy. Pertinent negatives for diabetic complications include no CVA, heart disease, impotence, nephropathy or PVD. Risk factors for coronary artery disease include hypertension, obesity and tobacco exposure. Current diabetic treatment includes insulin injections. He is compliant with treatment all of the time. He is currently taking insulin pre-breakfast, pre-lunch, pre-dinner and at bedtime. Insulin injections are given by patient. Rotation sites for injection include the abdominal wall and thighs. His weight is decreasing steadily. He is following a generally healthy diet. Meal planning includes avoidance of concentrated sweets. He has not had a previous visit with a dietitian. He participates in  exercise daily. He monitors blood glucose at home 5+ x per day. He monitors urine at home <1 x per month. Blood glucose monitoring compliance is good. His home blood glucose trend is fluctuating minimally. His breakfast blood glucose is taken between 5-6 am. His breakfast blood glucose range is generally 70-90 mg/dl. His lunch blood glucose is taken between 12-1 pm. His lunch blood glucose range is generally 130-140 mg/dl. His dinner blood glucose is taken between 7-8 pm. His dinner blood glucose range is generally 130-140 mg/dl. His overall blood glucose range is 140-180 mg/dl. He does not see a podiatrist.Eye exam is not current.   Hypertension   This is a chronic problem. The current episode started more than 1 year ago. The problem is controlled. Pertinent negatives include no blurred vision, palpitations, shortness of breath or sweats. Agents associated with hypertension include NSAIDs. Risk factors for coronary artery disease include smoking/tobacco exposure, stress, obesity, male gender and diabetes mellitus. Past treatments include ACE inhibitors. Current antihypertension treatment includes ACE inhibitors. The current treatment provides significant improvement. Hypertensive end-organ damage includes retinopathy. There is no history of CVA or PVD.   Insomnia   This is a recurrent problem. The current episode started more than 1 month ago. The problem occurs daily. The problem has been waxing and waning. Pertinent negatives include no chills. Associated symptoms comments: Insomnia, mind racing. . The symptoms are aggravated by stress and smoking. He has tried relaxation (Doxepin) for the symptoms. The treatment provided mild relief.   Depression   Visit Type: follow-up  Patient presents with the following symptoms: decreased concentration, depressed mood and insomnia.  Patient is not experiencing: confusion, impotence, nervousness/anxiety, palpitations, shortness of breath, suicidal ideas and weight  loss.  Frequency of symptoms: occasionally   Severity: moderate   Sleep quality: fair  Compliance with medications:  51-75%        Neurologic Problem   The patient's pertinent negatives include no syncope. Primary symptoms comment: Diabetic Neuropathy. This is a chronic problem. The current episode started more than 1 year ago. The neurological problem developed gradually. The problem has been gradually worsening since onset. Associated symptoms include back pain. Pertinent negatives include no confusion, dizziness, light-headedness, palpitations or shortness of breath. Treatments tried: Neurontin, Cymbalta. The treatment provided moderate relief.        The following portions of the patient's history were reviewed and updated as appropriate: allergies, current medications, past family history, past medical history, past social history, past surgical history and problem list.    Review of Systems   Constitutional: Negative.  Negative for activity change, chills and weight loss.   HENT: Positive for dental problem. Negative for ear pain and postnasal drip.          L lower molar abscess resolved.   Eyes: Negative for blurred vision, pain, discharge and visual disturbance.   Respiratory: Negative for apnea, shortness of breath and wheezing.    Cardiovascular: Negative for palpitations and leg swelling.   Gastrointestinal: Negative for blood in stool and constipation.   Endocrine: Negative for cold intolerance, heat intolerance, polydipsia, polyphagia and polyuria.   Genitourinary: Negative for discharge, dysuria and impotence.   Musculoskeletal: Positive for back pain.   Skin: Negative for color change, pallor and wound.   Allergic/Immunologic: Negative for environmental allergies and food allergies.   Neurological: Negative for dizziness, tremors, seizures, syncope, facial asymmetry, speech difficulty and light-headedness.        Diabetic Neuropathy   Psychiatric/Behavioral: Positive for decreased concentration,  dysphoric mood and sleep disturbance. Negative for agitation, behavioral problems, confusion, hallucinations, self-injury and suicidal ideas. The patient has insomnia. The patient is not nervous/anxious.        Objective   Physical Exam   Constitutional: He is oriented to person, place, and time. He appears well-developed and well-nourished. No distress.   HENT:   Head: Normocephalic and atraumatic.   Right Ear: External ear normal.   Left Ear: External ear normal.   Nose: Nose normal.   Mouth/Throat: Oropharynx is clear and moist.   Eyes: Conjunctivae and EOM are normal. Pupils are equal, round, and reactive to light. Right eye exhibits no discharge. Left eye exhibits no discharge. No scleral icterus.   Neck: Normal range of motion. No JVD present. No tracheal deviation present. No thyromegaly present.   Cardiovascular: Normal rate, regular rhythm, normal heart sounds and intact distal pulses. Exam reveals no gallop and no friction rub.   No murmur heard.  Pulmonary/Chest: Effort normal and breath sounds normal. No stridor. No respiratory distress. He has no wheezes. He has no rales. He exhibits no tenderness.   Abdominal: Soft. Bowel sounds are normal. He exhibits no distension and no mass. There is no tenderness. There is no rebound and no guarding. No hernia.   Musculoskeletal: He exhibits no edema, tenderness or deformity.       Neurological Sensory Findings -  Unaltered sharp/dull right ankle/foot discrimination and unaltered sharp/dull left ankle/foot discrimination.  Vascular Status -  His right foot exhibits normal foot vasculature . His left foot exhibits normal foot vasculature .  Skin Integrity  -  His right foot skin is intact.His left foot skin is intact..   Foot Structure and Biomechanics -  His right foot exhibits hallux valgus.  His left foot exhibits hallux valgus.  Lymphadenopathy:     He has no cervical adenopathy.   Neurological: He is alert and oriented to person, place, and time. He has normal  reflexes. He displays normal reflexes. No cranial nerve deficit. He exhibits normal muscle tone. Coordination normal.   Skin: Skin is warm and dry. No rash noted. He is not diaphoretic. No erythema. No pallor.   Psychiatric: He has a normal mood and affect. His behavior is normal. Judgment and thought content normal.   Nursing note and vitals reviewed.      Assessment/Plan   Mine was seen today for diabetes, hypertension and depression.    Diagnoses and all orders for this visit:    Uncontrolled type 2 diabetes mellitus with hyperglycemia (CMS/[facility])  -     TSH; Future  -     Comprehensive metabolic panel; Future  -     CBC & Differential; Future  -     Lipid Panel; Future  -     Urinalysis With Culture If Indicated - Urine, Clean Catch; Future  -     Hemoglobin A1c; Future    Type 2 diabetes mellitus with other specified complication, with long-term current use of insulin (CMS/[facility])  -     gabapentin (NEURONTIN) 800 MG tablet; Take 1 tablet by mouth 4 (Four) Times a Day.    Need for immunization against influenza  -     Flucelvax Quad=>4Years (PFS)    Essential hypertension    Non morbid obesity    Diabetic polyneuropathy associated with type 2 diabetes mellitus (CMS/[facility])    Chronic bilateral low back pain with sciatica, sciatica laterality unspecified    Insomnia due to medical condition      Discussed exam, health problems, meds, indications, tx plan, rationale. Discussed standards of care for DM. Discussed Goal HgbA1c 7 or less, compliance with tx plan. Discussed chronic pain. Discussed Safety with controlled med. Discussed F/u plan.    Patient's Body mass index is 33.57 kg/m². BMI is above normal parameters. Recommendations include: educational material, exercise counseling, nutrition counseling and referral to primary care.            This document has been electronically signed by Chris Spencer MD

## 2018-12-17 NOTE — PATIENT INSTRUCTIONS
Diabetes Mellitus and Standards of Medical Care  Managing diabetes (diabetes mellitus) can be complicated. Your diabetes treatment may be managed by a team of health care providers, including:  · A diet and nutrition specialist (registered dietitian).  · A nurse.  · A certified diabetes educator (CDE).  · A diabetes specialist (endocrinologist).  · An eye doctor.  · A primary care provider.  · A dentist.    Your health care providers follow a schedule in order to help you get the best quality of care. The following schedule is a general guideline for your diabetes management plan. Your health care providers may also give you more specific instructions.  HbA1c (  hemoglobin A1c) test  This test provides information about blood sugar (glucose) control over the previous 2-3 months. It is used to check whether your diabetes management plan needs to be adjusted.  · If you are meeting your treatment goals, this test is done at least 2 times a year.  · If you are not meeting treatment goals or if your treatment goals have changed, this test is done 4 times a year.    Blood pressure test  · This test is done at every routine medical visit. For most people, the goal is less than 130/80. Ask your health care provider what your goal blood pressure should be.  Dental and eye exams  · Visit your dentist two times a year.  · If you have type 1 diabetes, get an eye exam 3-5 years after you are diagnosed, and then once a year after your first exam.  ? If you were diagnosed with type 1 diabetes as a child, get an eye exam when you are age 10 or older and have had diabetes for 3-5 years. After the first exam, you should get an eye exam once a year.  · If you have type 2 diabetes, have an eye exam as soon as you are diagnosed, and then once a year after your first exam.  Foot care exam  · Visual foot exams are done at every routine medical visit. The exams check for cuts, bruises, redness, blisters, sores, or other problems with the  feet.  · A complete foot exam is done by your health care provider once a year. This exam includes an inspection of the structure and skin of your feet, and a check of the pulses and sensation in your feet.  ? Type 1 diabetes: Get your first exam 3-5 years after diagnosis.  ? Type 2 diabetes: Get your first exam as soon as you are diagnosed.  · Check your feet every day for cuts, bruises, redness, blisters, or sores. If you have any of these or other problems that are not healing, contact your health care provider.  Kidney function test (  urine microalbumin)  · This test is done once a year.  ? Type 1 diabetes: Get your first test 5 years after diagnosis.  ? Type 2 diabetes: Get your first test as soon as you are diagnosed.  · If you have chronic kidney disease (CKD), get a serum creatinine and estimated glomerular filtration rate (eGFR) test once a year.  Lipid profile (cholesterol, HDL, LDL, triglycerides)  · This test should be done when you are diagnosed with diabetes, and every 5 years after the first test. If you are on medicines to lower your cholesterol, you may need to get this test done every year.  ? The goal for LDL is less than 100 mg/dL (5.5 mmol/L). If you are at high risk, the goal is less than 70 mg/dL (3.9 mmol/L).  ? The goal for HDL is 40 mg/dL (2.2 mmol/L) for men and 50 mg/dL(2.8 mmol/L) for women. An HDL cholesterol of 60 mg/dL (3.3 mmol/L) or higher gives some protection against heart disease.  ? The goal for triglycerides is less than 150 mg/dL (8.3 mmol/L).  Immunizations  · The yearly flu (influenza) vaccine is recommended for everyone 6 months or older who has diabetes.  · The pneumonia (pneumococcal) vaccine is recommended for everyone 2 years or older who has diabetes. If you are 65 or older, you may get the pneumonia vaccine as a series of two separate shots.  · The hepatitis B vaccine is recommended for adults shortly after they have been diagnosed with diabetes.  · The Tdap  (tetanus, diphtheria, and pertussis) vaccine should be given:  ? According to normal childhood vaccination schedules, for children.  ? Every 10 years, for adults who have diabetes.  · The shingles vaccine is recommended for people who have had chicken pox and are 50 years or older.  Mental and emotional health  · Screening for symptoms of eating disorders, anxiety, and depression is recommended at the time of diagnosis and afterward as needed. If your screening shows that you have symptoms (you have a positive screening result), you may need further evaluation and be referred to a mental health care provider.  Diabetes self-management education  · Education about how to manage your diabetes is recommended at diagnosis and ongoing as needed.  Treatment plan  · Your treatment plan will be reviewed at every medical visit.  Summary  · Managing diabetes (diabetes mellitus) can be complicated. Your diabetes treatment may be managed by a team of health care providers.  · Your health care providers follow a schedule in order to help you get the best quality of care.  · Standards of care including having regular physical exams, blood tests, blood pressure monitoring, immunizations, screening tests, and education about how to manage your diabetes.  · Your health care providers may also give you more specific instructions based on your individual health.  This information is not intended to replace advice given to you by your health care provider. Make sure you discuss any questions you have with your health care provider.  Document Released: 10/15/2010 Document Revised: 09/15/2017 Document Reviewed: 09/15/2017  [employer] Interactive Patient Education © 2018 [employer]

## 2019-01-02 ENCOUNTER — LAB (OUTPATIENT)
Dept: LAB | Facility: HOSPITAL | Age: 49
End: 2019-01-02

## 2019-01-02 DIAGNOSIS — E11.65 UNCONTROLLED TYPE 2 DIABETES MELLITUS WITH HYPERGLYCEMIA (HCC): ICD-10-CM

## 2019-01-02 LAB
ALBUMIN SERPL-MCNC: 4 G/DL (ref 3.4–4.8)
ALBUMIN/GLOB SERPL: 1.5 G/DL (ref 1.1–1.8)
ALP SERPL-CCNC: 98 U/L (ref 38–126)
ALT SERPL W P-5'-P-CCNC: 20 U/L (ref 21–72)
ANION GAP SERPL CALCULATED.3IONS-SCNC: 13 MMOL/L (ref 5–15)
ARTICHOKE IGE QN: 84 MG/DL (ref 1–129)
AST SERPL-CCNC: 22 U/L (ref 17–59)
BASOPHILS # BLD AUTO: 0.02 10*3/MM3 (ref 0–0.2)
BASOPHILS NFR BLD AUTO: 0.2 % (ref 0–2)
BILIRUB SERPL-MCNC: 0.3 MG/DL (ref 0.2–1.3)
BILIRUB UR QL STRIP: NEGATIVE
BUN BLD-MCNC: 12 MG/DL (ref 7–21)
BUN/CREAT SERPL: 13.3 (ref 7–25)
CALCIUM SPEC-SCNC: 9 MG/DL (ref 8.4–10.2)
CHLORIDE SERPL-SCNC: 103 MMOL/L (ref 95–110)
CHOLEST SERPL-MCNC: 128 MG/DL (ref 0–199)
CLARITY UR: CLEAR
CO2 SERPL-SCNC: 20 MMOL/L (ref 22–31)
COLOR UR: YELLOW
CREAT BLD-MCNC: 0.9 MG/DL (ref 0.7–1.3)
DEPRECATED RDW RBC AUTO: 46.9 FL (ref 35.1–43.9)
EOSINOPHIL # BLD AUTO: 0.34 10*3/MM3 (ref 0–0.7)
EOSINOPHIL NFR BLD AUTO: 3.2 % (ref 0–7)
ERYTHROCYTE [DISTWIDTH] IN BLOOD BY AUTOMATED COUNT: 13.3 % (ref 11.5–14.5)
GFR SERPL CREATININE-BSD FRML MDRD: 91 ML/MIN/1.73 (ref 63–147)
GLOBULIN UR ELPH-MCNC: 2.7 GM/DL (ref 2.3–3.5)
GLUCOSE BLD-MCNC: 231 MG/DL (ref 60–100)
GLUCOSE UR STRIP-MCNC: ABNORMAL MG/DL
HBA1C MFR BLD: 7.6 % (ref 4–5.6)
HCT VFR BLD AUTO: 50 % (ref 39–49)
HDLC SERPL-MCNC: 29 MG/DL (ref 60–200)
HGB BLD-MCNC: 17.1 G/DL (ref 13.7–17.3)
HGB UR QL STRIP.AUTO: NEGATIVE
IMM GRANULOCYTES # BLD AUTO: 0.03 10*3/MM3 (ref 0–0.02)
IMM GRANULOCYTES NFR BLD AUTO: 0.3 % (ref 0–0.5)
KETONES UR QL STRIP: NEGATIVE
LDLC/HDLC SERPL: 1.29 {RATIO} (ref 0–3.55)
LEUKOCYTE ESTERASE UR QL STRIP.AUTO: NEGATIVE
LYMPHOCYTES # BLD AUTO: 3.17 10*3/MM3 (ref 0.6–4.2)
LYMPHOCYTES NFR BLD AUTO: 29.9 % (ref 10–50)
MCH RBC QN AUTO: 32.6 PG (ref 26.5–34)
MCHC RBC AUTO-ENTMCNC: 34.2 G/DL (ref 31.5–36.3)
MCV RBC AUTO: 95.2 FL (ref 80–98)
MONOCYTES # BLD AUTO: 0.96 10*3/MM3 (ref 0–0.9)
MONOCYTES NFR BLD AUTO: 9.1 % (ref 0–12)
NEUTROPHILS # BLD AUTO: 6.07 10*3/MM3 (ref 2–8.6)
NEUTROPHILS NFR BLD AUTO: 57.3 % (ref 37–80)
NITRITE UR QL STRIP: NEGATIVE
PH UR STRIP.AUTO: 5.5 [PH] (ref 5–9)
PLATELET # BLD AUTO: 195 10*3/MM3 (ref 150–450)
PMV BLD AUTO: 11.6 FL (ref 8–12)
POTASSIUM BLD-SCNC: 4.1 MMOL/L (ref 3.5–5.1)
PROT SERPL-MCNC: 6.7 G/DL (ref 6.3–8.6)
PROT UR QL STRIP: NEGATIVE
RBC # BLD AUTO: 5.25 10*6/MM3 (ref 4.37–5.74)
SODIUM BLD-SCNC: 136 MMOL/L (ref 137–145)
SP GR UR STRIP: 1.03 (ref 1–1.03)
TRIGL SERPL-MCNC: 308 MG/DL (ref 20–199)
TSH SERPL DL<=0.05 MIU/L-ACNC: 3.52 MIU/ML (ref 0.46–4.68)
UROBILINOGEN UR QL STRIP: ABNORMAL
WBC NRBC COR # BLD: 10.59 10*3/MM3 (ref 3.2–9.8)

## 2019-01-02 PROCEDURE — 83036 HEMOGLOBIN GLYCOSYLATED A1C: CPT

## 2019-01-02 PROCEDURE — 80053 COMPREHEN METABOLIC PANEL: CPT

## 2019-01-02 PROCEDURE — 81003 URINALYSIS AUTO W/O SCOPE: CPT

## 2019-01-02 PROCEDURE — 85025 COMPLETE CBC W/AUTO DIFF WBC: CPT

## 2019-01-02 PROCEDURE — 84443 ASSAY THYROID STIM HORMONE: CPT

## 2019-01-02 PROCEDURE — 80061 LIPID PANEL: CPT

## 2019-01-08 ENCOUNTER — TELEPHONE (OUTPATIENT)
Dept: FAMILY MEDICINE CLINIC | Facility: CLINIC | Age: 49
End: 2019-01-08

## 2019-01-08 DIAGNOSIS — R53.83 FATIGUE, UNSPECIFIED TYPE: Primary | ICD-10-CM

## 2019-01-08 NOTE — TELEPHONE ENCOUNTER
----- Message from Chris Spencer MD sent at 1/6/2019 10:05 PM CST -----  Blood sugars improved HgbA1c 7.6 good work!, other labs OK, will go over at next visit.

## 2019-01-16 RX ORDER — QUETIAPINE FUMARATE 25 MG/1
TABLET, FILM COATED ORAL
Qty: 30 TABLET | Refills: 4 | Status: SHIPPED | OUTPATIENT
Start: 2019-01-16 | End: 2019-07-19 | Stop reason: SDUPTHER

## 2019-01-16 RX ORDER — SIMVASTATIN 40 MG
TABLET ORAL
Qty: 30 TABLET | Refills: 4 | Status: SHIPPED | OUTPATIENT
Start: 2019-01-16 | End: 2019-08-17 | Stop reason: SDUPTHER

## 2019-01-16 RX ORDER — LISINOPRIL 20 MG/1
TABLET ORAL
Qty: 30 TABLET | Refills: 4 | Status: SHIPPED | OUTPATIENT
Start: 2019-01-16 | End: 2019-07-19 | Stop reason: SDUPTHER

## 2019-01-16 RX ORDER — INSULIN GLARGINE 100 [IU]/ML
INJECTION, SOLUTION SUBCUTANEOUS
Qty: 15 ML | Refills: 1 | Status: SHIPPED | OUTPATIENT
Start: 2019-01-16 | End: 2019-04-24 | Stop reason: SDUPTHER

## 2019-03-22 ENCOUNTER — LAB (OUTPATIENT)
Dept: LAB | Facility: HOSPITAL | Age: 49
End: 2019-03-22

## 2019-03-22 ENCOUNTER — OFFICE VISIT (OUTPATIENT)
Dept: FAMILY MEDICINE CLINIC | Facility: CLINIC | Age: 49
End: 2019-03-22

## 2019-03-22 VITALS
HEIGHT: 72 IN | HEART RATE: 84 BPM | SYSTOLIC BLOOD PRESSURE: 124 MMHG | TEMPERATURE: 98.2 F | DIASTOLIC BLOOD PRESSURE: 80 MMHG | OXYGEN SATURATION: 97 % | BODY MASS INDEX: 32.9 KG/M2 | WEIGHT: 242.9 LBS

## 2019-03-22 DIAGNOSIS — Z79.899 HIGH RISK MEDICATION USE: ICD-10-CM

## 2019-03-22 DIAGNOSIS — I10 ESSENTIAL HYPERTENSION: ICD-10-CM

## 2019-03-22 DIAGNOSIS — R53.83 FATIGUE, UNSPECIFIED TYPE: ICD-10-CM

## 2019-03-22 DIAGNOSIS — Z79.4 TYPE 2 DIABETES MELLITUS WITH OTHER SPECIFIED COMPLICATION, WITH LONG-TERM CURRENT USE OF INSULIN (HCC): ICD-10-CM

## 2019-03-22 DIAGNOSIS — E11.69 TYPE 2 DIABETES MELLITUS WITH OTHER SPECIFIED COMPLICATION, WITH LONG-TERM CURRENT USE OF INSULIN (HCC): ICD-10-CM

## 2019-03-22 DIAGNOSIS — K04.7 ABSCESSED TOOTH: ICD-10-CM

## 2019-03-22 DIAGNOSIS — Z79.4 TYPE 2 DIABETES MELLITUS WITH COMPLICATION, WITH LONG-TERM CURRENT USE OF INSULIN (HCC): Primary | ICD-10-CM

## 2019-03-22 DIAGNOSIS — E11.8 TYPE 2 DIABETES MELLITUS WITH COMPLICATION, WITH LONG-TERM CURRENT USE OF INSULIN (HCC): Primary | ICD-10-CM

## 2019-03-22 LAB
T4 FREE SERPL-MCNC: 1.01 NG/DL (ref 0.78–2.19)
TSH SERPL DL<=0.05 MIU/L-ACNC: 1.42 MIU/ML (ref 0.46–4.68)

## 2019-03-22 PROCEDURE — 84439 ASSAY OF FREE THYROXINE: CPT

## 2019-03-22 PROCEDURE — 99214 OFFICE O/P EST MOD 30 MIN: CPT | Performed by: FAMILY MEDICINE

## 2019-03-22 PROCEDURE — 84443 ASSAY THYROID STIM HORMONE: CPT

## 2019-03-22 PROCEDURE — 84481 FREE ASSAY (FT-3): CPT

## 2019-03-22 RX ORDER — TRAMADOL HYDROCHLORIDE 50 MG/1
100 TABLET ORAL EVERY 6 HOURS PRN
Qty: 24 TABLET | Refills: 1 | Status: SHIPPED | OUTPATIENT
Start: 2019-03-22 | End: 2019-03-25

## 2019-03-22 RX ORDER — CEPHALEXIN 500 MG/1
500 CAPSULE ORAL 3 TIMES DAILY
Qty: 21 CAPSULE | Refills: 1 | Status: SHIPPED | OUTPATIENT
Start: 2019-03-22 | End: 2019-06-18

## 2019-03-22 RX ORDER — GABAPENTIN 800 MG/1
800 TABLET ORAL 4 TIMES DAILY
Qty: 120 TABLET | Refills: 3 | Status: SHIPPED | OUTPATIENT
Start: 2019-03-22 | End: 2019-06-18 | Stop reason: SDUPTHER

## 2019-03-24 LAB — T3FREE SERPL-MCNC: 2.9 PG/ML (ref 2–4.4)

## 2019-03-26 RX ORDER — EMPAGLIFLOZIN 25 MG/1
TABLET, FILM COATED ORAL
Qty: 30 TABLET | Refills: 5 | Status: SHIPPED | OUTPATIENT
Start: 2019-03-26 | End: 2019-09-14 | Stop reason: SDUPTHER

## 2019-03-29 ENCOUNTER — TELEPHONE (OUTPATIENT)
Dept: FAMILY MEDICINE CLINIC | Facility: CLINIC | Age: 49
End: 2019-03-29

## 2019-03-29 NOTE — TELEPHONE ENCOUNTER
----- Message from Chris Spencer MD sent at 3/25/2019  7:36 PM CDT -----  Thyroid test are OK, will go over at next visit.

## 2019-04-24 RX ORDER — INSULIN GLARGINE 100 [IU]/ML
INJECTION, SOLUTION SUBCUTANEOUS
Qty: 15 ML | Refills: 0 | Status: SHIPPED | OUTPATIENT
Start: 2019-04-24 | End: 2019-05-21 | Stop reason: SDUPTHER

## 2019-05-21 RX ORDER — INSULIN GLARGINE 100 [IU]/ML
INJECTION, SOLUTION SUBCUTANEOUS
Qty: 15 ML | Refills: 0 | Status: SHIPPED | OUTPATIENT
Start: 2019-05-21 | End: 2019-06-24 | Stop reason: SDUPTHER

## 2019-06-18 ENCOUNTER — TELEPHONE (OUTPATIENT)
Dept: FAMILY MEDICINE CLINIC | Facility: CLINIC | Age: 49
End: 2019-06-18

## 2019-06-18 ENCOUNTER — OFFICE VISIT (OUTPATIENT)
Dept: FAMILY MEDICINE CLINIC | Facility: CLINIC | Age: 49
End: 2019-06-18

## 2019-06-18 ENCOUNTER — LAB (OUTPATIENT)
Dept: LAB | Facility: HOSPITAL | Age: 49
End: 2019-06-18

## 2019-06-18 VITALS
OXYGEN SATURATION: 96 % | BODY MASS INDEX: 32.06 KG/M2 | DIASTOLIC BLOOD PRESSURE: 66 MMHG | TEMPERATURE: 98.4 F | WEIGHT: 236.7 LBS | SYSTOLIC BLOOD PRESSURE: 120 MMHG | HEIGHT: 72 IN | HEART RATE: 100 BPM

## 2019-06-18 DIAGNOSIS — Z79.4 TYPE 2 DIABETES MELLITUS WITH COMPLICATION, WITH LONG-TERM CURRENT USE OF INSULIN (HCC): ICD-10-CM

## 2019-06-18 DIAGNOSIS — G62.9 NEUROPATHY: ICD-10-CM

## 2019-06-18 DIAGNOSIS — E66.9 NON MORBID OBESITY: ICD-10-CM

## 2019-06-18 DIAGNOSIS — G89.29 CHRONIC BILATERAL LOW BACK PAIN WITH SCIATICA, SCIATICA LATERALITY UNSPECIFIED: ICD-10-CM

## 2019-06-18 DIAGNOSIS — E11.69 TYPE 2 DIABETES MELLITUS WITH OTHER SPECIFIED COMPLICATION, WITH LONG-TERM CURRENT USE OF INSULIN (HCC): ICD-10-CM

## 2019-06-18 DIAGNOSIS — E11.65 UNCONTROLLED TYPE 2 DIABETES MELLITUS WITH HYPERGLYCEMIA (HCC): ICD-10-CM

## 2019-06-18 DIAGNOSIS — J34.9 SINUS PROBLEM: ICD-10-CM

## 2019-06-18 DIAGNOSIS — Z79.899 LONG-TERM USE OF HIGH-RISK MEDICATION: Primary | ICD-10-CM

## 2019-06-18 DIAGNOSIS — E11.8 TYPE 2 DIABETES MELLITUS WITH COMPLICATION, WITH LONG-TERM CURRENT USE OF INSULIN (HCC): ICD-10-CM

## 2019-06-18 DIAGNOSIS — I10 ESSENTIAL HYPERTENSION: ICD-10-CM

## 2019-06-18 DIAGNOSIS — Z79.4 TYPE 2 DIABETES MELLITUS WITH OTHER SPECIFIED COMPLICATION, WITH LONG-TERM CURRENT USE OF INSULIN (HCC): ICD-10-CM

## 2019-06-18 DIAGNOSIS — M54.40 CHRONIC BILATERAL LOW BACK PAIN WITH SCIATICA, SCIATICA LATERALITY UNSPECIFIED: ICD-10-CM

## 2019-06-18 DIAGNOSIS — E11.42 DIABETIC POLYNEUROPATHY ASSOCIATED WITH TYPE 2 DIABETES MELLITUS (HCC): ICD-10-CM

## 2019-06-18 LAB
AMPHET+METHAMPHET UR QL: NEGATIVE
BARBITURATES UR QL SCN: NEGATIVE
BENZODIAZ UR QL SCN: NEGATIVE
CANNABINOIDS SERPL QL: NEGATIVE
COCAINE UR QL: NEGATIVE
HBA1C MFR BLD: 7.26 % (ref 4.8–5.6)
METHADONE UR QL SCN: NEGATIVE
OPIATES UR QL: NEGATIVE
OXYCODONE UR QL SCN: NEGATIVE

## 2019-06-18 PROCEDURE — 80307 DRUG TEST PRSMV CHEM ANLYZR: CPT | Performed by: FAMILY MEDICINE

## 2019-06-18 PROCEDURE — 83036 HEMOGLOBIN GLYCOSYLATED A1C: CPT

## 2019-06-18 PROCEDURE — 99214 OFFICE O/P EST MOD 30 MIN: CPT | Performed by: FAMILY MEDICINE

## 2019-06-18 RX ORDER — AMOXICILLIN 500 MG/1
1000 CAPSULE ORAL 2 TIMES DAILY
Qty: 14 CAPSULE | Refills: 2 | Status: SHIPPED | OUTPATIENT
Start: 2019-06-18 | End: 2019-06-18 | Stop reason: SDUPTHER

## 2019-06-18 RX ORDER — GABAPENTIN 800 MG/1
800 TABLET ORAL 4 TIMES DAILY
Qty: 120 TABLET | Refills: 3 | Status: SHIPPED | OUTPATIENT
Start: 2019-06-18 | End: 2019-07-22 | Stop reason: SDUPTHER

## 2019-06-18 RX ORDER — AMOXICILLIN 500 MG/1
1000 CAPSULE ORAL 2 TIMES DAILY
Qty: 28 CAPSULE | Refills: 2 | Status: SHIPPED | OUTPATIENT
Start: 2019-06-18 | End: 2019-06-25

## 2019-06-18 RX ORDER — CLOTRIMAZOLE AND BETAMETHASONE DIPROPIONATE 10; .64 MG/G; MG/G
CREAM TOPICAL 2 TIMES DAILY
Qty: 45 G | Refills: 1 | Status: SHIPPED | OUTPATIENT
Start: 2019-06-18 | End: 2021-06-02

## 2019-06-18 NOTE — PROGRESS NOTES
Subjective   Mine Benedict is a 49 y.o.  Obese white male with uncontrolled DM II, HTN, Diabetic neuropathy, Depression, Chronic Low back pain H/O Lumbar Laminectomy, and other health problems, see PMH/PSH. Pt presents for exam, to discuss acute and chronic health problems, Tx and F/U plan.     ' Have a septal deviation, getting chronic sinus infections, would like to see ENT. Pain in feet from neuropathy is almost unbearable without Neurontin. Have problem with athletes foot. B/P has been good at checks. Blood sugars have been improving. Had chronically abscessed tooth cut out'.    Diabetes   He has type 2 diabetes mellitus. No MedicAlert identification noted. The initial diagnosis of diabetes was made 25 years ago. Pertinent negatives for hypoglycemia include no confusion, dizziness, hunger, nervousness/anxiousness, pallor, seizures, sleepiness, speech difficulty, sweats or tremors. Associated symptoms include foot paresthesias. Pertinent negatives for diabetes include no blurred vision, no foot ulcerations, no polydipsia, no polyphagia, no polyuria and no weight loss. Hypoglycemia complications include nocturnal hypoglycemia. Pertinent negatives for hypoglycemia complications include no hospitalization, no required assistance and no required glucagon injection. Symptoms are stable. Diabetic complications include peripheral neuropathy and retinopathy. Pertinent negatives for diabetic complications include no CVA, heart disease, impotence, nephropathy or PVD. Risk factors for coronary artery disease include hypertension, obesity and tobacco exposure. Current diabetic treatment includes insulin injections. He is compliant with treatment all of the time. He is currently taking insulin pre-breakfast, pre-lunch, pre-dinner and at bedtime. Insulin injections are given by patient. Rotation sites for injection include the abdominal wall and thighs. His weight is decreasing steadily. He is following a generally healthy  diet. Meal planning includes avoidance of concentrated sweets. He has not had a previous visit with a dietitian. He participates in exercise daily. He monitors blood glucose at home 5+ x per day. He monitors urine at home <1 x per month. Blood glucose monitoring compliance is good. His home blood glucose trend is fluctuating minimally. His breakfast blood glucose is taken between 5-6 am. His breakfast blood glucose range is generally 70-90 mg/dl. His lunch blood glucose is taken between 12-1 pm. His lunch blood glucose range is generally 130-140 mg/dl. His dinner blood glucose is taken between 7-8 pm. His dinner blood glucose range is generally 130-140 mg/dl. His overall blood glucose range is 140-180 mg/dl. He does not see a podiatrist.Eye exam is not current.   Hypertension   This is a chronic problem. The current episode started more than 1 year ago. The problem is controlled. Pertinent negatives include no blurred vision, palpitations, shortness of breath or sweats. Agents associated with hypertension include NSAIDs. Risk factors for coronary artery disease include smoking/tobacco exposure, stress, obesity, male gender and diabetes mellitus. Past treatments include ACE inhibitors. Current antihypertension treatment includes ACE inhibitors. The current treatment provides significant improvement. Hypertensive end-organ damage includes retinopathy. There is no history of CVA or PVD.   Insomnia   This is a recurrent problem. The current episode started more than 1 month ago. The problem occurs daily. The problem has been waxing and waning. Pertinent negatives include no chills. Associated symptoms comments: Insomnia, mind racing. . The symptoms are aggravated by stress and smoking. He has tried relaxation (Doxepin) for the symptoms. The treatment provided mild relief.   Dental Pain    This is a chronic problem. The current episode started more than 1 month ago. The problem has been resolved. The pain is at a  severity of 0/10. The patient is experiencing no pain. He has tried NSAIDs and acetaminophen (Abx, Had tooth extracted) for the symptoms. The treatment provided significant relief.   Depression   Visit Type: follow-up  Patient presents with the following symptoms: decreased concentration, depressed mood and insomnia.  Patient is not experiencing: confusion, impotence, nervousness/anxiety, palpitations, shortness of breath, suicidal ideas and weight loss.  Frequency of symptoms: occasionally   Severity: moderate   Sleep quality: fair  Compliance with medications:  51-75%        Neurologic Problem   The patient's pertinent negatives include no syncope. Primary symptoms comment: Diabetic Neuropathy. This is a chronic problem. The current episode started more than 1 year ago. The neurological problem developed gradually. The problem has been gradually worsening since onset. Associated symptoms include back pain. Pertinent negatives include no confusion, dizziness, light-headedness, palpitations or shortness of breath. Treatments tried: Neurontin, Cymbalta. The treatment provided moderate relief.        The following portions of the patient's history were reviewed and updated as appropriate: allergies, current medications, past family history, past medical history, past social history, past surgical history and problem list.    Review of Systems   Constitutional: Negative.  Negative for activity change, chills and weight loss.   HENT: Negative for dental problem, ear pain and postnasal drip.         S/P extraction L lower molar.   Eyes: Negative for blurred vision, pain, discharge and visual disturbance.   Respiratory: Negative for apnea, shortness of breath and wheezing.    Cardiovascular: Negative for palpitations and leg swelling.   Gastrointestinal: Negative for blood in stool and constipation.   Endocrine: Negative for cold intolerance, heat intolerance, polydipsia, polyphagia and polyuria.   Genitourinary: Negative  for discharge, dysuria and impotence.   Musculoskeletal: Positive for back pain.   Skin: Negative for color change, pallor and wound.   Allergic/Immunologic: Negative for environmental allergies and food allergies.   Neurological: Negative for dizziness, tremors, seizures, syncope, facial asymmetry, speech difficulty and light-headedness.        Diabetic Neuropathy   Hematological: Negative.    Psychiatric/Behavioral: Positive for decreased concentration, dysphoric mood and sleep disturbance. Negative for agitation, behavioral problems, confusion, hallucinations, self-injury and suicidal ideas. The patient has insomnia. The patient is not nervous/anxious.        Objective   Physical Exam   Constitutional: He is oriented to person, place, and time. He appears well-developed and well-nourished. No distress.   HENT:   Head: Normocephalic and atraumatic.   Right Ear: External ear normal.   Left Ear: External ear normal.   Nose: Nose normal.   Mouth/Throat: Oropharynx is clear and moist.   L rear molar gum open tooth socket S/P extraction   Eyes: Conjunctivae and EOM are normal. Pupils are equal, round, and reactive to light. Right eye exhibits no discharge. Left eye exhibits no discharge. No scleral icterus.   Neck: Normal range of motion. No JVD present. No tracheal deviation present. No thyromegaly present.   Cardiovascular: Normal rate, regular rhythm, normal heart sounds and intact distal pulses. Exam reveals no gallop and no friction rub.   No murmur heard.  Pulmonary/Chest: Effort normal and breath sounds normal. No stridor. No respiratory distress. He has no wheezes. He has no rales. He exhibits no tenderness.   Abdominal: Soft. Bowel sounds are normal. He exhibits no distension and no mass. There is no tenderness. There is no rebound and no guarding. No hernia.   Musculoskeletal: He exhibits no edema, tenderness or deformity.       Neurological Sensory Findings -  Unaltered sharp/dull right ankle/foot  discrimination and unaltered sharp/dull left ankle/foot discrimination.  Vascular Status -  His right foot exhibits normal foot vasculature . His left foot exhibits normal foot vasculature .  Skin Integrity  -  His right foot skin is intact.His left foot skin is intact..   Foot Structure and Biomechanics -  His right foot exhibits hallux valgus.  His left foot exhibits hallux valgus.  Lymphadenopathy:     He has no cervical adenopathy.   Neurological: He is alert and oriented to person, place, and time. He has normal reflexes. He displays normal reflexes. No cranial nerve deficit. He exhibits normal muscle tone. Coordination normal.   Skin: Skin is warm and dry. No rash noted. He is not diaphoretic. No erythema. No pallor.   Psychiatric: He has a normal mood and affect. His behavior is normal. Judgment and thought content normal.   Nursing note and vitals reviewed.      Assessment/Plan   Mine was seen today for hypertension, diabetes and depression.    Diagnoses and all orders for this visit:    Long-term use of high-risk medication  -     Urine Drug Screen - Urine, Clean Catch    Type 2 diabetes mellitus with other specified complication, with long-term current use of insulin (CMS/[facility])  -     Hemoglobin A1c; Future    Neuropathy  -     gabapentin (NEURONTIN) 800 MG tablet; Take 1 tablet by mouth 4 (Four) Times a Day.    Uncontrolled type 2 diabetes mellitus with hyperglycemia (CMS/HCC)    Sinus problem  -     Ambulatory Referral to ENT (Otolaryngology)  -     XR Sinuses 3+ View (In Office)    Chronic bilateral low back pain with sciatica, sciatica laterality unspecified    Diabetic polyneuropathy associated with type 2 diabetes mellitus (CMS/HCC)    Type 2 diabetes mellitus with complication, with long-term current use of insulin (CMS/HCC)    Non morbid obesity    Essential hypertension    Other orders  -     Discontinue: amoxicillin (AMOXIL) 500 MG capsule; Take 2 capsules by mouth 2 (Two) Times a Day.  -      mupirocin (BACTROBAN) 2 % ointment; Apply  topically to the appropriate area as directed 3 (Three) Times a Day.  -     clotrimazole-betamethasone (LOTRISONE) 1-0.05 % cream; Apply  topically to the appropriate area as directed 2 (Two) Times a Day. Athletes foot  -     amoxicillin (AMOXIL) 500 MG capsule; Take 2 capsules by mouth 2 (Two) Times a Day for 7 days.    Discussed exam, health problems, meds, indications, tx plan, rationale. Discussed USPSTF recommendations. Discussed standards of care for DM. Discussed BMI, BEE diet, exercise. Discussed referral to ENT. Discussed F/U here.           This document has been electronically signed by Chris Spencer MD

## 2019-06-18 NOTE — TELEPHONE ENCOUNTER
Wood from [facility] called and wants to make sure the script sent for amoxicillin is correct.  Was sent as 2 pills 2 times daily # 14 pills.  That is just a 3 day supply.  Can you please call him and verify [phone]

## 2019-06-23 PROBLEM — J34.9 SINUS PROBLEM: Status: ACTIVE | Noted: 2019-06-23

## 2019-06-24 RX ORDER — INSULIN GLARGINE 100 [IU]/ML
INJECTION, SOLUTION SUBCUTANEOUS
Qty: 15 ML | Refills: 0 | Status: SHIPPED | OUTPATIENT
Start: 2019-06-24 | End: 2019-07-23 | Stop reason: SDUPTHER

## 2019-06-24 RX ORDER — DULOXETIN HYDROCHLORIDE 60 MG/1
CAPSULE, DELAYED RELEASE ORAL
Qty: 30 CAPSULE | Refills: 4 | Status: SHIPPED | OUTPATIENT
Start: 2019-06-24 | End: 2019-11-25 | Stop reason: SDUPTHER

## 2019-07-19 RX ORDER — QUETIAPINE FUMARATE 25 MG/1
TABLET, FILM COATED ORAL
Qty: 26 TABLET | Refills: 3 | Status: SHIPPED | OUTPATIENT
Start: 2019-07-19 | End: 2019-11-10 | Stop reason: SDUPTHER

## 2019-07-19 RX ORDER — LISINOPRIL 20 MG/1
TABLET ORAL
Qty: 26 TABLET | Refills: 3 | Status: SHIPPED | OUTPATIENT
Start: 2019-07-19 | End: 2019-11-10 | Stop reason: SDUPTHER

## 2019-07-22 ENCOUNTER — OFFICE VISIT (OUTPATIENT)
Dept: FAMILY MEDICINE CLINIC | Facility: CLINIC | Age: 49
End: 2019-07-22

## 2019-07-22 VITALS
DIASTOLIC BLOOD PRESSURE: 70 MMHG | BODY MASS INDEX: 32.45 KG/M2 | TEMPERATURE: 98.3 F | OXYGEN SATURATION: 98 % | WEIGHT: 239.6 LBS | SYSTOLIC BLOOD PRESSURE: 116 MMHG | HEIGHT: 72 IN | HEART RATE: 71 BPM

## 2019-07-22 DIAGNOSIS — Z79.4 TYPE 2 DIABETES MELLITUS WITH OTHER SPECIFIED COMPLICATION, WITH LONG-TERM CURRENT USE OF INSULIN (HCC): ICD-10-CM

## 2019-07-22 DIAGNOSIS — G62.9 NEUROPATHY: ICD-10-CM

## 2019-07-22 DIAGNOSIS — E11.42 DIABETIC POLYNEUROPATHY ASSOCIATED WITH TYPE 2 DIABETES MELLITUS (HCC): Primary | ICD-10-CM

## 2019-07-22 DIAGNOSIS — M54.32 SCIATICA OF LEFT SIDE: ICD-10-CM

## 2019-07-22 DIAGNOSIS — E11.69 TYPE 2 DIABETES MELLITUS WITH OTHER SPECIFIED COMPLICATION, WITH LONG-TERM CURRENT USE OF INSULIN (HCC): ICD-10-CM

## 2019-07-22 PROCEDURE — 99214 OFFICE O/P EST MOD 30 MIN: CPT | Performed by: FAMILY MEDICINE

## 2019-07-22 RX ORDER — PEN NEEDLE, DIABETIC 30 GX3/16"
1 NEEDLE, DISPOSABLE MISCELLANEOUS 4 TIMES DAILY
Qty: 200 EACH | Refills: 5 | Status: SHIPPED | OUTPATIENT
Start: 2019-07-22 | End: 2021-06-02 | Stop reason: SDUPTHER

## 2019-07-22 RX ORDER — GABAPENTIN 800 MG/1
800 TABLET ORAL 4 TIMES DAILY
Qty: 120 TABLET | Refills: 3 | Status: SHIPPED | OUTPATIENT
Start: 2019-07-22 | End: 2019-09-17 | Stop reason: SDUPTHER

## 2019-07-22 RX ORDER — TIZANIDINE HYDROCHLORIDE 4 MG/1
4 CAPSULE, GELATIN COATED ORAL 3 TIMES DAILY
Qty: 90 CAPSULE | Refills: 1 | Status: SHIPPED | OUTPATIENT
Start: 2019-07-22 | End: 2019-08-05 | Stop reason: CLARIF

## 2019-07-22 RX ORDER — MELOXICAM 15 MG/1
15 TABLET ORAL DAILY
Qty: 30 TABLET | Refills: 1 | Status: SHIPPED | OUTPATIENT
Start: 2019-07-22 | End: 2020-01-12

## 2019-07-22 NOTE — PROGRESS NOTES
Subjective   Mine Benedict is a 49 y.o. Obese white male with uncontrolled DM II, HTN, Diabetic neuropathy, Depression, Chronic Low back pain H/O Lumbar Laminectomy, and other health problems, see PMH/PSH. Pt presents for exam, to discuss acute and chronic health problems, Tx and F/U plan.    ' Sciatica recurring, pain becoming intolerable 8-10 most of time, nothing helps, was on Pain management in past. B/P has been good. Blood sugars gradually improving'.    Diabetes   He has type 2 diabetes mellitus. No MedicAlert identification noted. The initial diagnosis of diabetes was made 25 years ago. Pertinent negatives for hypoglycemia include no confusion, dizziness, hunger, nervousness/anxiousness, pallor, seizures, sleepiness, speech difficulty, sweats or tremors. Associated symptoms include foot paresthesias. Pertinent negatives for diabetes include no blurred vision, no foot ulcerations, no polydipsia, no polyphagia, no polyuria and no weight loss. Hypoglycemia complications include nocturnal hypoglycemia. Pertinent negatives for hypoglycemia complications include no hospitalization, no required assistance and no required glucagon injection. Symptoms are stable. Diabetic complications include peripheral neuropathy and retinopathy. Pertinent negatives for diabetic complications include no CVA, heart disease, impotence, nephropathy or PVD. Risk factors for coronary artery disease include hypertension, obesity and tobacco exposure. Current diabetic treatment includes insulin injections. He is compliant with treatment all of the time. He is currently taking insulin pre-breakfast, pre-lunch, pre-dinner and at bedtime. Insulin injections are given by patient. Rotation sites for injection include the abdominal wall and thighs. His weight is decreasing steadily. He is following a generally healthy diet. Meal planning includes avoidance of concentrated sweets. He has not had a previous visit with a dietitian. He  participates in exercise daily. He monitors blood glucose at home 5+ x per day. He monitors urine at home <1 x per month. Blood glucose monitoring compliance is good. His home blood glucose trend is fluctuating minimally. His breakfast blood glucose is taken between 5-6 am. His breakfast blood glucose range is generally 70-90 mg/dl. His lunch blood glucose is taken between 12-1 pm. His lunch blood glucose range is generally 130-140 mg/dl. His dinner blood glucose is taken between 7-8 pm. His dinner blood glucose range is generally 130-140 mg/dl. His overall blood glucose range is 140-180 mg/dl. He does not see a podiatrist.Eye exam is not current.   Hypertension   This is a chronic problem. The current episode started more than 1 year ago. The problem is controlled. Pertinent negatives include no blurred vision, palpitations, shortness of breath or sweats. Agents associated with hypertension include NSAIDs. Risk factors for coronary artery disease include smoking/tobacco exposure, stress, obesity, male gender and diabetes mellitus. Past treatments include ACE inhibitors. Current antihypertension treatment includes ACE inhibitors. The current treatment provides significant improvement. Hypertensive end-organ damage includes retinopathy. There is no history of CVA or PVD.   Insomnia   This is a recurrent problem. The current episode started more than 1 month ago. The problem occurs daily. The problem has been waxing and waning. Pertinent negatives include no chills. Associated symptoms comments: Insomnia, mind racing. . The symptoms are aggravated by stress and smoking. He has tried relaxation (Doxepin) for the symptoms. The treatment provided mild relief.   Depression   Visit Type: follow-up  Patient presents with the following symptoms: decreased concentration, depressed mood and insomnia.  Patient is not experiencing: confusion, impotence, nervousness/anxiety, palpitations, shortness of breath, suicidal ideas and  weight loss.  Frequency of symptoms: occasionally   Severity: moderate   Sleep quality: fair  Compliance with medications:  51-75%        Neurologic Problem   The patient's pertinent negatives include no syncope. Primary symptoms comment: Diabetic Neuropathy. This is a chronic problem. The current episode started more than 1 year ago. The neurological problem developed gradually. The problem has been gradually worsening since onset. Associated symptoms include back pain. Pertinent negatives include no confusion, dizziness, light-headedness, palpitations or shortness of breath. Treatments tried: Neurontin, Cymbalta. The treatment provided moderate relief.   Back Pain   This is a recurrent problem. The current episode started 1 to 4 weeks ago. The problem occurs constantly. The problem has been gradually worsening since onset. The pain is present in the lumbar spine. The pain radiates to the left thigh. The pain is at a severity of 8/10. The pain is severe. The pain is worse during the night. The symptoms are aggravated by position. Pertinent negatives include no dysuria or weight loss. Risk factors: H/O lumbar fusion. He has tried analgesics, home exercises, heat, muscle relaxant and NSAIDs for the symptoms. The treatment provided mild relief.        The following portions of the patient's history were reviewed and updated as appropriate: allergies, current medications, past family history, past medical history, past social history, past surgical history and problem list.    Review of Systems   Constitutional: Negative.  Negative for activity change, chills and weight loss.   HENT: Negative for dental problem, ear pain and postnasal drip.         S/P extraction L lower molar.   Eyes: Negative for blurred vision, pain, discharge and visual disturbance.   Respiratory: Negative for apnea, shortness of breath and wheezing.    Cardiovascular: Negative for palpitations and leg swelling.   Gastrointestinal: Negative for  blood in stool and constipation.   Endocrine: Negative for cold intolerance, heat intolerance, polydipsia, polyphagia and polyuria.   Genitourinary: Negative for discharge, dysuria and impotence.   Musculoskeletal: Positive for back pain.        Sciatica, L side flaring   Skin: Negative for color change, pallor and wound.   Allergic/Immunologic: Negative for environmental allergies and food allergies.   Neurological: Negative for dizziness, tremors, seizures, syncope, facial asymmetry, speech difficulty and light-headedness.        Diabetic Neuropathy   Hematological: Negative.    Psychiatric/Behavioral: Positive for decreased concentration, dysphoric mood and sleep disturbance. Negative for agitation, behavioral problems, confusion, hallucinations, self-injury and suicidal ideas. The patient has insomnia. The patient is not nervous/anxious.        Objective   Physical Exam   Constitutional: He is oriented to person, place, and time. He appears well-developed and well-nourished. No distress.   HENT:   Head: Normocephalic and atraumatic.   Right Ear: External ear normal.   Left Ear: External ear normal.   Nose: Nose normal.   Mouth/Throat: Oropharynx is clear and moist.   Eyes: Conjunctivae and EOM are normal. Pupils are equal, round, and reactive to light. Right eye exhibits no discharge. Left eye exhibits no discharge. No scleral icterus.   Neck: Normal range of motion. No JVD present. No tracheal deviation present. No thyromegaly present.   Cardiovascular: Normal rate, regular rhythm, normal heart sounds and intact distal pulses. Exam reveals no gallop and no friction rub.   No murmur heard.  Pulmonary/Chest: Effort normal and breath sounds normal. No stridor. No respiratory distress. He has no wheezes. He has no rales. He exhibits no tenderness.   Abdominal: Soft. Bowel sounds are normal. He exhibits no distension and no mass. There is no tenderness. There is no rebound and no guarding. No hernia.    Musculoskeletal: He exhibits no edema, tenderness or deformity.       Neurological Sensory Findings -  Unaltered sharp/dull right ankle/foot discrimination and unaltered sharp/dull left ankle/foot discrimination.  Vascular Status -  His right foot exhibits normal foot vasculature . His left foot exhibits normal foot vasculature .  Skin Integrity  -  His right foot skin is intact.His left foot skin is intact..   Foot Structure and Biomechanics -  His right foot exhibits hallux valgus.  His left foot exhibits hallux valgus.  Lymphadenopathy:     He has no cervical adenopathy.   Neurological: He is alert and oriented to person, place, and time. He has normal reflexes. He displays normal reflexes. No cranial nerve deficit. He exhibits normal muscle tone. Coordination normal.   Skin: Skin is warm and dry. Capillary refill takes 2 to 3 seconds. No rash noted. He is not diaphoretic. No erythema. No pallor.   Psychiatric: He has a normal mood and affect. His behavior is normal. Judgment and thought content normal.   Nursing note and vitals reviewed.      Assessment/Plan   Mine was seen today for sciatica.    Diagnoses and all orders for this visit:    Diabetic polyneuropathy associated with type 2 diabetes mellitus (CMS/HCC)    Sciatica of left side  Comments:  acute on chronic.  Orders:  -     Ambulatory Referral to Physical Therapy  -     MRI Lumbar Spine Without Contrast; Future  -     XR Spine Lumbar 2 or 3 View (In Office)    Type 2 diabetes mellitus with other specified complication, with long-term current use of insulin (CMS/HCC)    Neuropathy  -     gabapentin (NEURONTIN) 800 MG tablet; Take 1 tablet by mouth 4 (Four) Times a Day.    Adult BMI 32.0-32.9 kg/sq m    Other orders  -     Insulin Pen Needle (PEN NEEDLES) 32G X 4 MM misc; 1 each 4 (Four) Times a Day. Use to inject insulin 4 times daily  -     meloxicam (MOBIC) 15 MG tablet; Take 1 tablet by mouth Daily.  -     TiZANidine (ZANAFLEX) 4 MG capsule; Take  1 capsule by mouth 3 (Three) Times a Day.    Discussed exam, health problems, sciatica, meds, indications, Tx plan, rationale. Discussed checking X-ray, referral to PT, MRI if not improving. Discussed BMI, BEE, diet, exercise. Discussed F/U plan.     Patient's Body mass index is 32.5 kg/m². BMI is above normal parameters. Recommendations include: educational material, exercise counseling and nutrition counseling.          This document has been electronically signed by Chris Spencer MD

## 2019-07-23 RX ORDER — INSULIN GLARGINE 100 [IU]/ML
INJECTION, SOLUTION SUBCUTANEOUS
Qty: 15 ML | Refills: 5 | Status: SHIPPED | OUTPATIENT
Start: 2019-07-23 | End: 2020-01-07 | Stop reason: SDUPTHER

## 2019-07-29 ENCOUNTER — TELEPHONE (OUTPATIENT)
Dept: FAMILY MEDICINE CLINIC | Facility: CLINIC | Age: 49
End: 2019-07-29

## 2019-07-29 NOTE — TELEPHONE ENCOUNTER
----- Message from Chris Spencer MD sent at 7/29/2019  7:50 AM CDT -----  Arthritic changes in spine, kidney stone on L, constipation, will go over x-ray at F/U visit.

## 2019-08-05 ENCOUNTER — OFFICE VISIT (OUTPATIENT)
Dept: FAMILY MEDICINE CLINIC | Facility: CLINIC | Age: 49
End: 2019-08-05

## 2019-08-05 VITALS
TEMPERATURE: 98.1 F | SYSTOLIC BLOOD PRESSURE: 120 MMHG | BODY MASS INDEX: 32.49 KG/M2 | HEIGHT: 72 IN | OXYGEN SATURATION: 99 % | HEART RATE: 70 BPM | WEIGHT: 239.9 LBS | DIASTOLIC BLOOD PRESSURE: 66 MMHG

## 2019-08-05 DIAGNOSIS — M54.40 CHRONIC BILATERAL LOW BACK PAIN WITH SCIATICA, SCIATICA LATERALITY UNSPECIFIED: ICD-10-CM

## 2019-08-05 DIAGNOSIS — E11.65 UNCONTROLLED TYPE 2 DIABETES MELLITUS WITH HYPERGLYCEMIA (HCC): ICD-10-CM

## 2019-08-05 DIAGNOSIS — G89.29 CHRONIC BILATERAL LOW BACK PAIN WITH SCIATICA, SCIATICA LATERALITY UNSPECIFIED: ICD-10-CM

## 2019-08-05 DIAGNOSIS — I10 ESSENTIAL HYPERTENSION: ICD-10-CM

## 2019-08-05 DIAGNOSIS — E11.69 TYPE 2 DIABETES MELLITUS WITH OTHER SPECIFIED COMPLICATION, WITH LONG-TERM CURRENT USE OF INSULIN (HCC): ICD-10-CM

## 2019-08-05 DIAGNOSIS — G62.9 NEUROPATHY: ICD-10-CM

## 2019-08-05 DIAGNOSIS — Z79.4 TYPE 2 DIABETES MELLITUS WITH OTHER SPECIFIED COMPLICATION, WITH LONG-TERM CURRENT USE OF INSULIN (HCC): ICD-10-CM

## 2019-08-05 DIAGNOSIS — F32.A DEPRESSION, UNSPECIFIED DEPRESSION TYPE: ICD-10-CM

## 2019-08-05 DIAGNOSIS — E11.42 DIABETIC POLYNEUROPATHY ASSOCIATED WITH TYPE 2 DIABETES MELLITUS (HCC): ICD-10-CM

## 2019-08-05 PROCEDURE — 99214 OFFICE O/P EST MOD 30 MIN: CPT | Performed by: FAMILY MEDICINE

## 2019-08-05 RX ORDER — TIZANIDINE 4 MG/1
1 TABLET ORAL 3 TIMES DAILY
COMMUNITY
Start: 2019-07-22 | End: 2020-08-03

## 2019-08-05 NOTE — PROGRESS NOTES
Subjective   Mine Benedict is a 49 y.o. Obese white male with uncontrolled DM II, HTN, Diabetic neuropathy, Depression, Chronic Low back pain H/O Lumbar Laminectomy, and other health problems, see PMH/PSH. Pt presents for exam, to discuss acute and chronic health problems, Tx and F/U plan.    ' Low back pain about the same, have not started PT yet. Blood sugars have been resonable. B/P has been stable. Mood stable on Cymbalta'.     Diabetes   He has type 2 diabetes mellitus. No MedicAlert identification noted. The initial diagnosis of diabetes was made 25 years ago. Pertinent negatives for hypoglycemia include no confusion, dizziness, hunger, nervousness/anxiousness, pallor, seizures, sleepiness, speech difficulty, sweats or tremors. Associated symptoms include foot paresthesias. Pertinent negatives for diabetes include no blurred vision, no foot ulcerations, no polydipsia, no polyphagia, no polyuria and no weight loss. Hypoglycemia complications include nocturnal hypoglycemia. Pertinent negatives for hypoglycemia complications include no hospitalization, no required assistance and no required glucagon injection. Symptoms are stable. Diabetic complications include peripheral neuropathy and retinopathy. Pertinent negatives for diabetic complications include no CVA, heart disease, impotence, nephropathy or PVD. Risk factors for coronary artery disease include hypertension, obesity and tobacco exposure. Current diabetic treatment includes insulin injections. He is compliant with treatment all of the time. He is currently taking insulin pre-breakfast, pre-lunch, pre-dinner and at bedtime. Insulin injections are given by patient. Rotation sites for injection include the abdominal wall and thighs. His weight is decreasing steadily. He is following a generally healthy diet. Meal planning includes avoidance of concentrated sweets. He has not had a previous visit with a dietitian. He participates in exercise daily. He  monitors blood glucose at home 5+ x per day. He monitors urine at home <1 x per month. Blood glucose monitoring compliance is good. His home blood glucose trend is fluctuating minimally. His breakfast blood glucose is taken between 5-6 am. His breakfast blood glucose range is generally 70-90 mg/dl. His lunch blood glucose is taken between 12-1 pm. His lunch blood glucose range is generally 130-140 mg/dl. His dinner blood glucose is taken between 7-8 pm. His dinner blood glucose range is generally 130-140 mg/dl. His overall blood glucose range is 140-180 mg/dl. He does not see a podiatrist.Eye exam is not current.   Hypertension   This is a chronic problem. The current episode started more than 1 year ago. The problem is controlled. Pertinent negatives include no blurred vision, palpitations, shortness of breath or sweats. Agents associated with hypertension include NSAIDs. Risk factors for coronary artery disease include smoking/tobacco exposure, stress, obesity, male gender and diabetes mellitus. Past treatments include ACE inhibitors. Current antihypertension treatment includes ACE inhibitors. The current treatment provides significant improvement. Hypertensive end-organ damage includes retinopathy. There is no history of CVA or PVD.   Insomnia   This is a recurrent problem. The current episode started more than 1 month ago. The problem occurs daily. The problem has been waxing and waning. Pertinent negatives include no chills. Associated symptoms comments: Insomnia, mind racing. . The symptoms are aggravated by stress and smoking. He has tried relaxation (Doxepin) for the symptoms. The treatment provided mild relief.   Depression   Visit Type: follow-up  Patient presents with the following symptoms: decreased concentration, depressed mood and insomnia.  Patient is not experiencing: confusion, impotence, nervousness/anxiety, palpitations, shortness of breath, suicidal ideas and weight loss.  Frequency of symptoms:  occasionally   Severity: moderate   Sleep quality: fair  Compliance with medications:  51-75%        Neurologic Problem   The patient's pertinent negatives include no syncope. Primary symptoms comment: Diabetic Neuropathy. This is a chronic problem. The current episode started more than 1 year ago. The neurological problem developed gradually. The problem has been gradually worsening since onset. Associated symptoms include back pain. Pertinent negatives include no confusion, dizziness, light-headedness, palpitations or shortness of breath. Treatments tried: Neurontin, Cymbalta. The treatment provided moderate relief.   Back Pain   This is a recurrent problem. The current episode started more than 1 month ago. The problem occurs constantly. The problem has been gradually worsening since onset. The pain is present in the lumbar spine. The pain radiates to the left thigh. The pain is at a severity of 8/10. The pain is severe. The pain is worse during the night. The symptoms are aggravated by position. Pertinent negatives include no dysuria or weight loss. Risk factors: H/O lumbar fusion. He has tried analgesics, home exercises, heat, muscle relaxant and NSAIDs for the symptoms. The treatment provided mild relief.        The following portions of the patient's history were reviewed and updated as appropriate: allergies, current medications, past family history, past medical history, past social history, past surgical history and problem list.    Review of Systems   Constitutional: Negative.  Negative for activity change, chills and weight loss.   HENT: Negative for dental problem, ear pain and postnasal drip.         S/P extraction L lower molar.   Eyes: Negative for blurred vision, pain, discharge and visual disturbance.   Respiratory: Negative for apnea, shortness of breath and wheezing.    Cardiovascular: Negative for palpitations and leg swelling.   Gastrointestinal: Negative for blood in stool and constipation.    Endocrine: Negative for cold intolerance, heat intolerance, polydipsia, polyphagia and polyuria.   Genitourinary: Negative for discharge, dysuria and impotence.   Musculoskeletal: Positive for back pain.        Sciatica, L side flaring   Skin: Negative for color change, pallor and wound.   Allergic/Immunologic: Negative for environmental allergies and food allergies.   Neurological: Negative for dizziness, tremors, seizures, syncope, facial asymmetry, speech difficulty and light-headedness.        Diabetic Neuropathy   Hematological: Negative.    Psychiatric/Behavioral: Positive for decreased concentration, dysphoric mood and sleep disturbance. Negative for agitation, behavioral problems, confusion, hallucinations, self-injury and suicidal ideas. The patient has insomnia. The patient is not nervous/anxious.        Objective   Physical Exam   Constitutional: He is oriented to person, place, and time. He appears well-developed and well-nourished. No distress.   HENT:   Head: Normocephalic and atraumatic.   Right Ear: External ear normal.   Left Ear: External ear normal.   Nose: Nose normal.   Mouth/Throat: Oropharynx is clear and moist.   Eyes: Conjunctivae and EOM are normal. Pupils are equal, round, and reactive to light. Right eye exhibits no discharge. Left eye exhibits no discharge. No scleral icterus.   Neck: Normal range of motion. No JVD present. No tracheal deviation present. No thyromegaly present.   Cardiovascular: Normal rate, regular rhythm, normal heart sounds and intact distal pulses. Exam reveals no gallop and no friction rub.   No murmur heard.  Pulmonary/Chest: Effort normal and breath sounds normal. No stridor. No respiratory distress. He has no wheezes. He has no rales. He exhibits no tenderness.   Abdominal: Soft. Bowel sounds are normal. He exhibits no distension and no mass. There is no tenderness. There is no rebound and no guarding. No hernia.   Musculoskeletal: He exhibits no edema,  tenderness or deformity.       Neurological Sensory Findings -  Unaltered sharp/dull right ankle/foot discrimination and unaltered sharp/dull left ankle/foot discrimination.  Vascular Status -  His right foot exhibits normal foot vasculature . His left foot exhibits normal foot vasculature .  Skin Integrity  -  His right foot skin is intact.His left foot skin is intact..   Foot Structure and Biomechanics -  His right foot exhibits hallux valgus.  His left foot exhibits hallux valgus.  Lymphadenopathy:     He has no cervical adenopathy.   Neurological: He is alert and oriented to person, place, and time. He has normal reflexes. He displays normal reflexes. No cranial nerve deficit. He exhibits normal muscle tone. Coordination normal.   Skin: Skin is warm and dry. Capillary refill takes 2 to 3 seconds. No rash noted. He is not diaphoretic. No erythema. No pallor.   Psychiatric: He has a normal mood and affect. His behavior is normal. Judgment and thought content normal.   Nursing note and vitals reviewed.      Assessment/Plan   Mine was seen today for back pain.    Diagnoses and all orders for this visit:    Adult BMI 32.0-32.9 kg/sq m    Chronic bilateral low back pain with sciatica, sciatica laterality unspecified    Uncontrolled type 2 diabetes mellitus with hyperglycemia (CMS/HCC)    Diabetic polyneuropathy associated with type 2 diabetes mellitus (CMS/HCC)    Neuropathy    Type 2 diabetes mellitus with other specified complication, with long-term current use of insulin (CMS/[facility])    Essential hypertension    Depression, unspecified depression type    Discussed exam, health problems, meds, indications, tx plan, rationale. Discussed referral to PT. Discussed F/U here.          This document has been electronically signed by Chris Spencer MD

## 2019-08-08 PROBLEM — F32.A DEPRESSION: Status: ACTIVE | Noted: 2019-08-08

## 2019-08-09 NOTE — PATIENT INSTRUCTIONS
Diabetes Mellitus and Standards of Medical Care  Managing diabetes (diabetes mellitus) can be complicated. Your diabetes treatment may be managed by a team of health care providers, including:  · A physician who specializes in diabetes (endocrinologist).  · A nurse practitioner or physician assistant.  · Nurses.  · A diet and nutrition specialist (registered dietitian).  · A certified diabetes educator (CDE).  · An exercise specialist.  · A pharmacist.  · An eye doctor.  · A foot specialist (podiatrist).  · A dentist.  · A primary care provider.  · A mental health provider.  Your health care providers follow guidelines to help you get the best quality of care. The following schedule is a general guideline for your diabetes management plan. Your health care providers may give you more specific instructions.  Physical exams  Upon being diagnosed with diabetes mellitus, and each year after that, your health care provider will ask about your medical and family history. He or she will also do a physical exam. Your exam may include:  · Measuring your height, weight, and body mass index (BMI).  · Checking your blood pressure. This will be done at every routine medical visit. Your target blood pressure may vary depending on your medical conditions, your age, and other factors.  · Thyroid gland exam.  · Skin exam.  · Screening for damage to your nerves (peripheral neuropathy). This may include checking the pulse in your legs and feet and checking the level of sensation in your hands and feet.  · A complete foot exam to inspect the structure and skin of your feet, including checking for cuts, bruises, redness, blisters, sores, or other problems.  · Screening for blood vessel (vascular) problems, which may include checking the pulse in your legs and feet and checking your temperature.  Blood tests  Depending on your treatment plan and your personal needs, you may have the following tests done:  · HbA1c (hemoglobin A1c). This  test provides information about blood sugar (glucose) control over the previous 2-3 months. It is used to adjust your treatment plan, if needed. This test will be done:  ? At least 2 times a year, if you are meeting your treatment goals.  ? 4 times a year, if you are not meeting your treatment goals or if treatment goals have changed.  · Lipid testing, including total, LDL, and HDL cholesterol and triglyceride levels.  ? The goal for LDL is less than 100 mg/dL (5.5 mmol/L). If you are at high risk for complications, the goal is less than 70 mg/dL (3.9 mmol/L).  ? The goal for HDL is 40 mg/dL (2.2 mmol/L) or higher for men and 50 mg/dL(2.8 mmol/L) or higher for women. An HDL cholesterol of 60 mg/dL (3.3 mmol/L) or higher gives some protection against heart disease.  ? The goal for triglycerides is less than 150 mg/dL (8.3 mmol/L).  · Liver function tests.  · Kidney function tests.  · Thyroid function tests.  Dental and eye exams  · Visit your dentist two times a year.  · If you have type 1 diabetes, your health care provider may recommend an eye exam 3-5 years after you are diagnosed, and then once a year after your first exam.  ? For children with type 1 diabetes, a health care provider may recommend an eye exam when your child is age 10 or older and has had diabetes for 3-5 years. After the first exam, your child should get an eye exam once a year.  · If you have type 2 diabetes, your health care provider may recommend an eye exam as soon as you are diagnosed, and then once a year after your first exam.  Immunizations    · The yearly flu (influenza) vaccine is recommended for everyone 6 months or older who has diabetes.  · The pneumonia (pneumococcal) vaccine is recommended for everyone 2 years or older who has diabetes. If you are 65 or older, you may get the pneumonia vaccine as a series of two separate shots.  · The hepatitis B vaccine is recommended for adults shortly after being diagnosed with  diabetes.  · Adults and children with diabetes should receive all other vaccines according to age-specific recommendations from the Centers for Disease Control and Prevention (CDC).  Mental and emotional health  Screening for symptoms of eating disorders, anxiety, and depression is recommended at the time of diagnosis and afterward as needed. If your screening shows that you have symptoms (positive screening result), you may need more evaluation and you may work with a mental health care provider.  Treatment plan  Your treatment plan will be reviewed at every medical visit. You and your health care provider will discuss:  · How you are taking your medicines, including insulin.  · Any side effects you are experiencing.  · Your blood glucose target goals.  · The frequency of your blood glucose monitoring.  · Lifestyle habits, such as activity level as well as tobacco, alcohol, and substance use.  Diabetes self-management education  Your health care provider will assess how well you are monitoring your blood glucose levels and whether you are taking your insulin correctly. He or she may refer you to:  · A certified diabetes educator to manage your diabetes throughout your life, starting at diagnosis.  · A registered dietitian who can create or review your personal nutrition plan.  · An exercise specialist who can discuss your activity level and exercise plan.  Summary  · Managing diabetes (diabetes mellitus) can be complicated. Your diabetes treatment may be managed by a team of health care providers.  · Your health care providers follow guidelines in order to help you get the best quality of care.  · Standards of care including having regular physical exams, blood tests, blood pressure monitoring, immunizations, screening tests, and education about how to manage your diabetes.  · Your health care providers may also give you more specific instructions based on your individual health.  This information is not intended  to replace advice given to you by your health care provider. Make sure you discuss any questions you have with your health care provider.  Document Released: 10/15/2010 Document Revised: 06/28/2018 Document Reviewed: 09/15/2017  [employer] Interactive Patient Education © 2019 [employer]

## 2019-08-14 ENCOUNTER — HOSPITAL ENCOUNTER (OUTPATIENT)
Dept: PHYSICAL THERAPY | Facility: HOSPITAL | Age: 49
Setting detail: THERAPIES SERIES
Discharge: HOME | End: 2019-08-14

## 2019-08-14 DIAGNOSIS — M54.32 SCIATICA OF LEFT SIDE: Primary | ICD-10-CM

## 2019-08-14 PROCEDURE — 97110 THERAPEUTIC EXERCISES: CPT | Performed by: PHYSICAL THERAPIST

## 2019-08-14 PROCEDURE — 97162 PT EVAL MOD COMPLEX 30 MIN: CPT | Performed by: PHYSICAL THERAPIST

## 2019-08-14 NOTE — THERAPY EVALUATION
Outpatient Physical Therapy Ortho Initial Evaluation  [facility]     Patient Name: Mine Benedict  : 1970  MRN: [id number]  Today's Date: 2019      Visit Date: 2019     Subjective Improvement: N/A      Attendance:   Approved:    20 visits then requires auth       MD follow up:    MRI 19; follow up 19       RC date:     19      Patient Active Problem List   Diagnosis   • Diabetes type 2, uncontrolled (CMS/HCC)   • Hyperlipidemia associated with type 2 diabetes mellitus (CMS/HCC)   • Essential hypertension   • Non morbid obesity   • Tobacco user   • Tobacco abuse counseling   • Encounter for immunization   • Chronic back pain   • History of back surgery   • Type 2 diabetes mellitus with complication, with long-term current use of insulin (CMS/HCC)   • Mild episode of recurrent major depressive disorder (CMS/HCC)   • Abscessed tooth   • Mixed hyperlipidemia   • Type 2 diabetes mellitus (CMS/HCC)   • High risk medication use   • Body mass index (bmi) 31.0-31.9, adult   • Need for immunization against influenza   • Depression, reactive   • Insomnia due to medical condition   • Right shoulder pain   • Diabetic neuropathy (CMS/HCC)   • Neuropathy   • Sinus problem   • Sciatica of left side   • Adult BMI 32.0-32.9 kg/sq m   • Depression        Past Medical History:   Diagnosis Date   • Diabetic neuropathy (CMS/HCC)    • Hypertensive disorder    • Lumbago     sciatica due to displacement of lumbar intervertebral disc - S/P Laminectomy      • Major depressive disorder, recurrent (CMS/HCC)     unspecified   • Tobacco dependence syndrome    • Type 2 diabetes mellitus (CMS/HCC)         Past Surgical History:   Procedure Laterality Date   • INJECTION OF MEDICATION  09/15/2015    Kenalog (1)      • INJECTION OF MEDICATION  2016    Toradol (1)      • LUMBAR LAMINECTOMY  10/29/2014    12- had L5 S1 for lumbago, sciatica Dr. Aguilar.     No Known  Allergies      Current Outpatient Medications:   •  clotrimazole-betamethasone (LOTRISONE) 1-0.05 % cream, Apply  topically to the appropriate area as directed 2 (Two) Times a Day. Athletes foot, Disp: 45 g, Rfl: 1  •  DULoxetine (CYMBALTA) 60 MG capsule, TAKE ONE CAPSULE BY MOUTH DAILY, Disp: 30 capsule, Rfl: 4  •  gabapentin (NEURONTIN) 800 MG tablet, Take 1 tablet by mouth 4 (Four) Times a Day., Disp: 120 tablet, Rfl: 3  •  Insulin Glargine (BASAGLAR KWIKPEN) 100 UNIT/ML injection pen, Inject 38 units under the skin nightly (Patient taking differently: Inject 50 units under the skin nightly), Disp: 15 mL, Rfl: 5  •  Insulin Lispro (ADMELOG SOLOSTAR) 100 UNIT/ML solution pen-injector, Inject 5 to 10 units SQ three times a day with meals (Patient taking differently: Inject 15 to 20 units SQ three times a day with meals), Disp: 15 mL, Rfl: 5  •  Insulin Pen Needle (PEN NEEDLES) 32G X 4 MM misc, 1 each 4 (Four) Times a Day. Use to inject insulin 4 times daily, Disp: 200 each, Rfl: 5  •  JARDIANCE 25 MG tablet, TAKE ONE TABLET BY MOUTH DAILY, Disp: 30 tablet, Rfl: 5  •  lisinopril (PRINIVIL,ZESTRIL) 20 MG tablet, TAKE ONE TABLET BY MOUTH DAILY, Disp: 26 tablet, Rfl: 3  •  meloxicam (MOBIC) 15 MG tablet, Take 1 tablet by mouth Daily., Disp: 30 tablet, Rfl: 1  •  mupirocin (BACTROBAN) 2 % ointment, Apply  topically to the appropriate area as directed 3 (Three) Times a Day., Disp: 30 g, Rfl: 1  •  QUEtiapine (SEROquel) 25 MG tablet, TAKE 1 TABLET BY MOUTH NIGHTLY, Disp: 26 tablet, Rfl: 3  •  simvastatin (ZOCOR) 40 MG tablet, TAKE ONE TABLET BY MOUTH EVERY EVENING, Disp: 30 tablet, Rfl: 4  •  tiZANidine (ZANAFLEX) 4 MG tablet, Take 1 tablet by mouth 3 (Three) Times a Day., Disp: , Rfl:   •  vitamin B-12 (CYANOCOBALAMIN) 1000 MCG tablet, Take 1,000 mcg by mouth 3 (Three) Times a Week., Disp: , Rfl:       Visit Dx:     ICD-10-CM ICD-9-CM   1. Sciatica of left side M54.32 724.3         Patient History     Row Name 08/14/19  1300             History    Chief Complaint  Pain  -KG      Type of Pain  Back pain;Lower Extremity / Leg  -KG      Date Current Problem(s) Began  -- Chronic; recent exacerbation 3 weeks  -KG      Brief Description of Current Complaint  Goes for MRI this Friday. Couldn't have an MRI without trying therapy first. Pt states he tried therapy after his surgery 4 years ago and it did seem to help. Hx of L5-S1 laminectomy. Pain gotten worse recently. ~3 weeks ago he woke up and had no control of LLE and buttock. States that was the same type of pain that he had before his surgery. States the pain travels down to his knee, hasn't gone past there yet. Has numbness but it is intermittent. Pt has type II diabetes with neuropathy in bilateral feet.  -KG      Patient/Caregiver Goals  Relieve pain;Return to prior level of function;Improve strength;Know what to do to help the symptoms  -KG      Occupation/sports/leisure activities  Pt is a self-employed farmer; mostly produce. Would like to get back to exercising, pain limiting him. Takes in rescued cats/kittens until adopted.  -KG      What clinical tests have you had for this problem?  X-ray  -KG      Results of Clinical Tests  Per med report: Small marginal osteophytes L3-4 and L4-5, Facet arthropathy L4-5 and L5-S1 with mild anterolisthesis of L4 on L5  -KG         Pain     Pain Location  Back;Leg  -KG      Pain at Present  3  -KG      Pain at Best  2  -KG      Pain at Worst  9  -KG      Pain Frequency  Constant/continuous  -KG      Pain Description  Aching;Sharp;Tightness  -KG      What Performance Factors Make the Current Problem(s) WORSE?  Walking frequently or extended periods of time. [place] when walking around [place] farm, standing for longer periods  -KG      What Performance Factors Make the Current Problem(s) BETTER?  Laying down helps, doing some of his stretches from previous therapy  -KG      Tolerance Time- Lying  Laying down helps; sleeps on either side  -KG      Is  your sleep disturbed?  No At times  -KG      Difficulties with recreational activities?  Would like to be able to get back to exercising  -KG        User Key  (r) = Recorded By, (t) = Taken By, (c) = Cosigned By    Initials Name Provider Type    FADI Ada Holguin, PT Physical Therapist          PT Ortho     Row Name 08/14/19 1300       Subjective Comments    Subjective Comments  Refer to therapy pt history for details.  -KG       Precautions and Contraindications    Precautions/Limitations  no known precautions/limitations  -KG       Subjective Pain    Able to rate subjective pain?  yes  -KG    Pre-Treatment Pain Level  3  -KG    Post-Treatment Pain Level  3  -KG       Posture/Observations    Posture/Observations Comments  No acute distress. Non-antalgic gait with no AD, sitting with weightshifted towards Right side. Supine L ASIS inferior vs R, LLE short. Prone L sacral base slightly more prominent. Corrected fairly well with MET. Fair postural awareness, rounded shouders.  -KG       Quarter Clearing    Quarter Clearing  Lower Quarter Clearing  -KG       DTR- Lower Quarter Clearing    Patellar tendon (L2-4)  Bilateral:;2- Normal response  -KG    Achilles tendon (S1-2)  Bilateral:;2- Normal response  -KG       Neural Tension Signs- Lower Quarter Clearing    Slump  Bilateral:;Negative  -KG    SLR  Bilateral:;Negative  -KG       Sensory Screen for Light Touch- Lower Quarter Clearing    L1 (inguinal area)  Bilateral:;Intact  -KG    L2 (anterior mid thigh)  Bilateral:;Intact  -KG    L3 (distal anterior thigh)  Bilateral:;Intact  -KG    L4 (medial lower leg/foot)  Bilateral:;Intact  -KG    L5 (lateral lower leg/great toe)  Bilateral:;Intact  -KG    S1 (bottom of foot)  Bilateral:;Diminished  -KG       Lumbar ROM Screen- Lower Quarter Clearing    Lumbar Flexion  Impaired  -KG    Lumbar Extension  Impaired 80% of full motion; discomfort low back  -KG    Lumbar Lateral Flexion  Impaired R fingertips knee jt line, pain L  low back; L w/tightness R  -KG       SI/Hip Screen- Lower Quarter Clearing    ASIS compression  Bilateral:;Negative  -KG    ASIS distraction  Bilateral:;Negative  -KG    Kathleen's/Wilson's test  Left:;Positive  -KG    Posterior thigh sheer  Bilateral:;Negative  -KG       Special Tests/Palpation    Special Tests/Palpation  Lumbar/SI  -KG       Lumbosacral Palpation    Lumbosacral Palpation?  Yes  -KG    SI  Bilateral:;Tender  -KG    Piriformis  Left:;Guarded/taut;Tender  -KG    Erector Spinae (Paraspinals)  Bilateral:;Guarded/taut  -KG       General ROM    GENERAL ROM COMMENTS  Bilateral hip, knee, ankle AROM WFL. Taut at end range with passive L hip flex; pain low back  -KG       MMT (Manual Muscle Testing)    Rt Lower Ext  Rt Hip Flexion;Rt Hip ABduction;Rt Hip ADduction;Rt Knee Extension;Rt Knee Flexion  -KG    Lt Lower Ext  Lt Hip Flexion;Lt Hip ABduction;Lt Hip ADduction;Lt Knee Extension;Lt Knee Flexion  -KG       MMT Right Lower Ext    Rt Hip Flexion MMT, Gross Movement  (5/5) normal  -KG    Rt Hip ABduction MMT, Gross Movement  (5/5) normal  -KG    Rt Hip ADduction MMT, Gross Movement  (5/5) normal  -KG    Rt Knee Extension MMT, Gross Movement  (5/5) normal  -KG    Rt Knee Flexion MMT, Gross Movement  (5/5) normal  -KG       MMT Left Lower Ext    Lt Hip Flexion MMT, Gross Movement  (4/5) good  -KG    Lt Hip ABduction MMT, Gross Movement  (4/5) good  -KG    Lt Knee Extension MMT, Gross Movement  (4/5) good  -KG    Lt Knee Flexion MMT, Gross Movement  (4+/5) good plus  -KG    Lt Lower Extremity Comments   Pain in low back with with resisted hip flex and knee ext  -KG       Flexibility    Flexibility Tested?  Lower Extremity  -KG       Lower Extremity Flexibility    Hamstrings  Left:;Mildly limited  -KG    Hip External Rotators  Left:;Mildly limited  -KG      User Key  (r) = Recorded By, (t) = Taken By, (c) = Cosigned By    Initials Name Provider Type    Ada Aly, PT Physical Therapist                       Therapy Education  Education Details: POC education. Anatomy ed related to condition. HEP: LTR, supine piriformis stretch, isometric TrA, seated hamstring stretch  Given: HEP, Symptoms/condition management, Pain management, Posture/body mechanics  Program: New  How Provided: Verbal, Demonstration, Written  Provided to: Patient  Level of Understanding: Teach back education performed, Verbalized, Demonstrated     PT OP Goals     Row Name 08/14/19 1300          PT Short Term Goals    STG Date to Achieve  09/04/19  -KG     STG 1  Demonstrate independence/compliance with HEP  -KG     STG 1 Progress  New  -KG     STG 2  Tolerate 45 minute treatment session without increased pain  -KG     STG 2 Progress  New  -KG     STG 3  Demonstrate independence in isometric TrA activities throughout treatment session for functional carryover into daily activity and work performance  -KG     STG 3 Progress  New  -KG     STG 4  Demonstrate improved L hip flex/abd MMT to 4+/5  -KG     STG 4 Progress  New  -KG        Long Term Goals    LTG Date to Achieve  09/25/19  -KG     LTG 1  Subjectively report 60% improvement or greater  -KG     LTG 1 Progress  New  -KG     LTG 2  Improve Modified oswestry score to 20% or less  -KG     LTG 2 Progress  New  -KG     LTG 3  Demonstrate improved bilateral lumbar lat flex AROM WNL without increased pain  -KG     LTG 3 Progress  New  -KG     LTG 4  Tolerate 15 minutes of standing therex/stabilization activities without increased pain  -KG     LTG 4 Progress  New  -KG     LTG 5  Demosntrate improved L knee/hip MMT to 5/5  -KG     LTG 5 Progress  New  -KG     LTG 6  Demonstrate improved postural awareness/body mechanics throughout treatment session with minimal cuing required  -KG     LTG 6 Progress  New  -KG        Time Calculation    PT Goal Re-Cert Due Date  09/04/19  -KG       User Key  (r) = Recorded By, (t) = Taken By, (c) = Cosigned By    Initials Name Provider Type    FADI Holguin  Ada ABBOTT, PT Physical Therapist          PT Assessment/Plan     Row Name 08/14/19 1300          PT Assessment    Functional Limitations  Limitation in home management;Limitations in community activities;Performance in leisure activities;Performance in work activities  -KG     Impairments  Impaired flexibility;Impaired muscle endurance;Joint mobility;Muscle strength;Pain;Poor body mechanics;Posture;Range of motion  -KG     Assessment Comments  Pt is a 49 y.o. male presenting with actue exacerbation of low back and LLE pain. Hx of L5-S1 laminectomy in 2014. Reports intermittent radicular symptoms but neurotension signs negative this date. Presents with weakness in core stability with lumbopelvic malalignment present. Mild weakness present in LLE vs RLE. Pt will benefit from skilled PT services to address these deficits for improvements in functional core stability, LE strengthening, body mechanics, and overall toelrance to daily functional and work activities.  -KG     Please refer to paper survey for additional self-reported information  Yes  -KG     Rehab Potential  Good  -KG     Patient/caregiver participated in establishment of treatment plan and goals  Yes  -KG     Patient would benefit from skilled therapy intervention  Yes  -KG        PT Plan    PT Frequency  2x/week  -KG     Predicted Duration of Therapy Intervention (Therapy Eval)  4-5 weeks  -KG     Planned CPT's?  PT EVAL MOD COMPLELITY: 24703;PT RE-EVAL: 10165;PT THER ACT EA 15 MIN: 80431;PT THER PROC EA 15 MIN: 06289;PT MANUAL THERAPY EA 15 MIN: 41977;PT NEUROMUSC RE-EDUCATION EA 15 MIN: 24765;PT SELF CARE/HOME MGMT/TRAIN EA 15: 91002;PT ELECTRICAL STIM UNATTEND: ;PT THER SUPP EA 15 MIN  -KG     Physical Therapy Interventions (Optional Details)  home exercise program;joint mobilization;lumbar stabilization;manual therapy techniques;modalities;neuromuscular re-education;patient/family education;prosthetic fitting/training;ROM (Range of  "Motion);strengthening;stretching  -KG     PT Plan Comments  Follow up with core stabilization/strengthening, LE strengthening, manual prn, modalities prn.  -KG       User Key  (r) = Recorded By, (t) = Taken By, (c) = Cosigned By    Initials Name Provider Type    Ada Aly, PT Physical Therapist            Exercises     Row Name 08/14/19 1300             Subjective Comments    Subjective Comments  Refer to therapy pt history for details.  -KG         Subjective Pain    Able to rate subjective pain?  yes  -KG      Pre-Treatment Pain Level  3  -KG      Post-Treatment Pain Level  3  -KG         Aquatics    Aquatics performed?  No  -KG         Exercise 1    Exercise Name 1  LTR  -KG      Cueing 1  Verbal  -KG      Sets 1  1  -KG      Reps 1  10  -KG      Time 1  ~5\" hold  -KG      Additional Comments  10\" hold for HEP  -KG         Exercise 2    Exercise Name 2  Supine piriformis stretch  -KG      Cueing 2  Verbal  -KG      Reps 2  2  -KG      Time 2  30\" hold  -KG         Exercise 3    Exercise Name 3  Isometric TrA education  -KG      Cueing 3  Verbal;Tactile  -KG      Time 3  4 min  -KG         Exercise 4    Exercise Name 4  Seated hamstring stretch with foot on stool  -KG      Cueing 4  Verbal  -KG      Reps 4  2  -KG      Time 4  30\" hold  -KG        User Key  (r) = Recorded By, (t) = Taken By, (c) = Cosigned By    Initials Name Provider Type    Ada Aly, PT Physical Therapist           Manual Rx (last 36 hours)      Manual Treatments     Row Name 08/14/19 1300             Manual Rx 1    Manual Rx 1 Location  Prone: sacrum/post hip  -KG      Manual Rx 1 Type  Sacral mobilization L>R  -KG        User Key  (r) = Recorded By, (t) = Taken By, (c) = Cosigned By    Initials Name Provider Type    Ada Aly, PT Physical Therapist                      Outcome Measure Options: Modifed Owestry  Modified Oswestry  Modified Oswestry Score/Comments: 30%      Time Calculation:     Start Time: " 1350  Stop Time: 1435  Time Calculation (min): 45 min  Total Timed Code Minutes- PT: 17 minute(s)     Therapy Charges for Today     Code Description Service Date Service Provider Modifiers Qty    05447628695 HC PT EVAL MOD COMPLEXITY 2 8/14/2019 Ada Holguin, PT GP 1    37219527330 HC PT THER PROC EA 15 MIN 8/14/2019 Ada Holguin, PT GP 1          PT G-Codes  Outcome Measure Options: Modifed Owestry  Modified Oswestry Score/Comments: 30%         Ada Holguin, PT  8/14/2019

## 2019-08-15 ENCOUNTER — HOSPITAL ENCOUNTER (OUTPATIENT)
Dept: PHYSICAL THERAPY | Facility: HOSPITAL | Age: 49
Setting detail: THERAPIES SERIES
Discharge: HOME | End: 2019-08-15

## 2019-08-15 DIAGNOSIS — M54.32 SCIATICA OF LEFT SIDE: Primary | ICD-10-CM

## 2019-08-15 PROCEDURE — 97110 THERAPEUTIC EXERCISES: CPT | Performed by: SPECIALIST/TECHNOLOGIST

## 2019-08-15 NOTE — THERAPY TREATMENT NOTE
Outpatient Physical Therapy Ortho Treatment Note  [facility]     Patient Name: Mine Benedict  : 1970  MRN: [id number]  Today's Date: 8/15/2019      Visit Date: 08/15/2019     [place]    Patients reports pain as:  5/10   Patient reports overall % improvement as:  0%   Patients attendance has been:  2/2   Insurance visits available   20 visits   Next MD visit is:  - Aut. 16, 2019  MD - Aug. 17, 2019   Next PT recertification is:  2019         Visit Dx:    ICD-10-CM ICD-9-CM   1. Sciatica of left side M54.32 724.3       Patient Active Problem List   Diagnosis   • Diabetes type 2, uncontrolled (CMS/HCC)   • Hyperlipidemia associated with type 2 diabetes mellitus (CMS/HCC)   • Essential hypertension   • Non morbid obesity   • Tobacco user   • Tobacco abuse counseling   • Encounter for immunization   • Chronic back pain   • History of back surgery   • Type 2 diabetes mellitus with complication, with long-term current use of insulin (CMS/HCC)   • Mild episode of recurrent major depressive disorder (CMS/HCC)   • Abscessed tooth   • Mixed hyperlipidemia   • Type 2 diabetes mellitus (CMS/HCC)   • High risk medication use   • Body mass index (bmi) 31.0-31.9, adult   • Need for immunization against influenza   • Depression, reactive   • Insomnia due to medical condition   • Right shoulder pain   • Diabetic neuropathy (CMS/HCC)   • Neuropathy   • Sinus problem   • Sciatica of left side   • Adult BMI 32.0-32.9 kg/sq m   • Depression        Past Medical History:   Diagnosis Date   • Diabetic neuropathy (CMS/HCC)    • Hypertensive disorder    • Lumbago     sciatica due to displacement of lumbar intervertebral disc - S/P Laminectomy      • Major depressive disorder, recurrent (CMS/HCC)     unspecified   • Tobacco dependence syndrome    • Type 2 diabetes mellitus (CMS/HCC)         Past Surgical History:   Procedure Laterality Date   • INJECTION OF MEDICATION  09/15/2015    Kenalog (1)      •  INJECTION OF MEDICATION  03/02/2016    Toradol (1)      • LUMBAR LAMINECTOMY  10/29/2014    10- had L5 S1 for lumbago, sciatica Dr. Aguilar.       PT Ortho     Row Name 08/14/19 1300       Subjective Comments    Subjective Comments  Refer to therapy pt history for details.  -KG       Precautions and Contraindications    Precautions/Limitations  no known precautions/limitations  -KG       Subjective Pain    Able to rate subjective pain?  yes  -KG    Pre-Treatment Pain Level  3  -KG    Post-Treatment Pain Level  3  -KG       Posture/Observations    Posture/Observations Comments  No acute distress. Non-antalgic gait with no AD, sitting with weightshifted towards Right side. Supine L ASIS inferior vs R, LLE short. Prone L sacral base slightly more prominent. Corrected fairly well with MET. Fair postural awareness, rounded shouders.  -KG       Quarter Clearing    Quarter Clearing  Lower Quarter Clearing  -KG       DTR- Lower Quarter Clearing    Patellar tendon (L2-4)  Bilateral:;2- Normal response  -KG    Achilles tendon (S1-2)  Bilateral:;2- Normal response  -KG       Neural Tension Signs- Lower Quarter Clearing    Slump  Bilateral:;Negative  -KG    SLR  Bilateral:;Negative  -KG       Sensory Screen for Light Touch- Lower Quarter Clearing    L1 (inguinal area)  Bilateral:;Intact  -KG    L2 (anterior mid thigh)  Bilateral:;Intact  -KG    L3 (distal anterior thigh)  Bilateral:;Intact  -KG    L4 (medial lower leg/foot)  Bilateral:;Intact  -KG    L5 (lateral lower leg/great toe)  Bilateral:;Intact  -KG    S1 (bottom of foot)  Bilateral:;Diminished  -KG       Lumbar ROM Screen- Lower Quarter Clearing    Lumbar Flexion  Impaired  -KG    Lumbar Extension  Impaired 80% of full motion; discomfort low back  -KG    Lumbar Lateral Flexion  Impaired R fingertips knee jt line, pain L low back; L w/tightness R  -KG       SI/Hip Screen- Lower Quarter Clearing    ASIS compression  Bilateral:;Negative  -KG    ASIS distraction   Bilateral:;Negative  -KG    Kathleen's/Wilson's test  Left:;Positive  -KG    Posterior thigh sheer  Bilateral:;Negative  -KG       Special Tests/Palpation    Special Tests/Palpation  Lumbar/SI  -KG       Lumbosacral Palpation    Lumbosacral Palpation?  Yes  -KG    SI  Bilateral:;Tender  -KG    Piriformis  Left:;Guarded/taut;Tender  -KG    Erector Spinae (Paraspinals)  Bilateral:;Guarded/taut  -KG       General ROM    GENERAL ROM COMMENTS  Bilateral hip, knee, ankle AROM WFL. Taut at end range with passive L hip flex; pain low back  -KG       MMT (Manual Muscle Testing)    Rt Lower Ext  Rt Hip Flexion;Rt Hip ABduction;Rt Hip ADduction;Rt Knee Extension;Rt Knee Flexion  -KG    Lt Lower Ext  Lt Hip Flexion;Lt Hip ABduction;Lt Hip ADduction;Lt Knee Extension;Lt Knee Flexion  -KG       MMT Right Lower Ext    Rt Hip Flexion MMT, Gross Movement  (5/5) normal  -KG    Rt Hip ABduction MMT, Gross Movement  (5/5) normal  -KG    Rt Hip ADduction MMT, Gross Movement  (5/5) normal  -KG    Rt Knee Extension MMT, Gross Movement  (5/5) normal  -KG    Rt Knee Flexion MMT, Gross Movement  (5/5) normal  -KG       MMT Left Lower Ext    Lt Hip Flexion MMT, Gross Movement  (4/5) good  -KG    Lt Hip ABduction MMT, Gross Movement  (4/5) good  -KG    Lt Knee Extension MMT, Gross Movement  (4/5) good  -KG    Lt Knee Flexion MMT, Gross Movement  (4+/5) good plus  -KG    Lt Lower Extremity Comments   Pain in low back with with resisted hip flex and knee ext  -KG       Flexibility    Flexibility Tested?  Lower Extremity  -KG       Lower Extremity Flexibility    Hamstrings  Left:;Mildly limited  -KG    Hip External Rotators  Left:;Mildly limited  -KG      User Key  (r) = Recorded By, (t) = Taken By, (c) = Cosigned By    Initials Name Provider Type    KG Ada Holguin, PT Physical Therapist                      PT Assessment/Plan     Row Name 08/15/19 0900 08/14/19 1300       PT Assessment    Functional Limitations  --  Limitation in home  management;Limitations in community activities;Performance in leisure activities;Performance in work activities  -KG    Impairments  --  Impaired flexibility;Impaired muscle endurance;Joint mobility;Muscle strength;Pain;Poor body mechanics;Posture;Range of motion  -KG    Assessment Comments  pt able to progress w/ ther-ex new.  some pain reported at lumbar w/ HLM and patient able to complete.  emphasis with core stab (tight abs) during movements.    (Pended)   -ML  Pt is a 49 y.o. male presenting with actue exacerbation of low back and LLE pain. Hx of L5-S1 laminectomy in 2014. Reports intermittent radicular symptoms but neurotension signs negative this date. Presents with weakness in core stability with lumbopelvic malalignment present. Mild weakness present in LLE vs RLE. Pt will benefit from skilled PT services to address these deficits for improvements in functional core stability, LE strengthening, body mechanics, and overall toelrance to daily functional and work activities.  -KG    Please refer to paper survey for additional self-reported information  --  Yes  -KG    Rehab Potential  --  Good  -KG    Patient/caregiver participated in establishment of treatment plan and goals  --  Yes  -KG    Patient would benefit from skilled therapy intervention  --  Yes  -KG       PT Plan    PT Frequency  --  2x/week  -KG    Predicted Duration of Therapy Intervention (Therapy Eval)  7-9 more visits  (Pended)   -ML  4-5 weeks  -KG    Planned CPT's?  --  PT EVAL MOD COMPLELITY: 44369;PT RE-EVAL: 92910;PT THER ACT EA 15 MIN: 44118;PT THER PROC EA 15 MIN: 80737;PT MANUAL THERAPY EA 15 MIN: 09454;PT NEUROMUSC RE-EDUCATION EA 15 MIN: 57707;PT SELF CARE/HOME MGMT/TRAIN EA 15: 40561;PT ELECTRICAL STIM UNATTEND: ;PT THER SUPP EA 15 MIN  -KG    Physical Therapy Interventions (Optional Details)  --  home exercise program;joint mobilization;lumbar stabilization;manual therapy techniques;modalities;neuromuscular  "re-education;patient/family education;prosthetic fitting/training;ROM (Range of Motion);strengthening;stretching  -KG    PT Plan Comments  check response.  add low bridge.    (Pended)   -ML  Follow up with core stabilization/strengthening, LE strengthening, manual prn, modalities prn.  -KG      User Key  (r) = Recorded By, (t) = Taken By, (c) = Cosigned By    Initials Name Provider Type    ML Houston Galvan, [facility]     KG Ada Holguin, PT Physical Therapist          Modalities     Row Name 08/15/19 0900             Ice    Patient reports will apply ice at home to involved area  Yes  (Pended)   -ML        User Key  (r) = Recorded By, (t) = Taken By, (c) = Cosigned By    Initials Name Provider Type     Houston Galvan, [facility]         Exercises     Row Name 08/15/19 0800 08/14/19 1300          Subjective Comments    Subjective Comments  pt reports doing HEP.  no c/o's  wants to get MRI done to get to next step, but also realizes nees work on soft tissue to be able to resume exercising. wants to lose weight as well.   (Pended)   -ML  Refer to therapy pt history for details.  -KG        Subjective Pain    Able to rate subjective pain?  yes  (Pended)   -ML  yes  -KG     Pre-Treatment Pain Level  5  (Pended)   -ML  3  -KG     Post-Treatment Pain Level  5  (Pended)   -ML  3  -KG     Subjective Pain Comment  --  (Pended)  pt unable to relay location of pain \"it's complicated\"   -ML  --        Aquatics    Aquatics performed?  --  No  -KG        Exercise 1    Exercise Name 1  pro2 LE   (Pended)   -ML  LTR  -KG     Cueing 1  --  Verbal  -KG     Sets 1  --  1  -KG     Reps 1  --  10  -KG     Time 1  10 min  (Pended)   -ML  ~5\" hold  -KG     Additional Comments  L4.0 S=9  (Pended)   -ML  10\" hold for HEP  -KG        Exercise 2    Exercise Name 2  Sit Pirif S  (Pended)   -ML  Supine piriformis stretch  -KG     Cueing 2  --  Verbal  -KG     Reps 2  2  (Pended)   -ML  2  -KG     Time 2  30 sec  " "(Pended)   -ML  30\" hold  -KG        Exercise 3    Exercise Name 3  Swiss: DK-C rolls  (Pended)   -ML  Isometric TrA education  -KG     Cueing 3  --  Verbal;Tactile  -KG     Reps 3  2  (Pended)   -ML  --     Time 3  10  (Pended)   -ML  4 min  -KG     Additional Comments  ab recruit   (Pended)   -ML  --        Exercise 4    Exercise Name 4  LTR w/ add  (Pended)   -ML  Seated hamstring stretch with foot on stool  -KG     Cueing 4  --  Verbal  -KG     Reps 4  10  (Pended)   -ML  2  -KG     Time 4  --  30\" hold  -KG     Additional Comments  sm red ball  (Pended)   -ML  --        Exercise 5    Exercise Name 5  Supine: alt HLM  (Pended)   -ML  --     Reps 5  20  (Pended)   -ML  --        Exercise 6    Exercise Name 6  Seated Long Hamstring  (Pended)   -ML  --     Sets 6  2  (Pended)   -ML  --     Time 6  30 sec  (Pended)   -ML  --        Exercise 7    Exercise Name 7  lateral step ups   (Pended)   -ML  --     Reps 7  10   (Pended)   -ML  --     Additional Comments  both- 4\" step. tight abs  (Pended)   -ML  --       User Key  (r) = Recorded By, (t) = Taken By, (c) = Cosigned By    Initials Name Provider Type    Houston Denise, ATC     Ada Aly, PT Physical Therapist                      Manual Rx (last 36 hours)      Manual Treatments     Row Name 08/14/19 1300             Manual Rx 1    Manual Rx 1 Location  Prone: sacrum/post hip  -KG      Manual Rx 1 Type  Sacral mobilization L>R  -KG        User Key  (r) = Recorded By, (t) = Taken By, (c) = Cosigned By    Initials Name Provider Type    Ada Aly, PT Physical Therapist          PT OP Goals     Row Name 08/15/19 0800 08/14/19 1300       PT Short Term Goals    STG Date to Achieve  --  09/04/19  -KG    STG 1  --  Demonstrate independence/compliance with HEP  -KG    STG 1 Progress  --  New  -KG    STG 2  --  Tolerate 45 minute treatment session without increased pain  -KG    STG 2 Progress  --  New  -KG    STG 3  --  Demonstrate " independence in isometric TrA activities throughout treatment session for functional carryover into daily activity and work performance  -KG    STG 3 Progress  --  New  -KG    STG 4  --  Demonstrate improved L hip flex/abd MMT to 4+/5  -KG    STG 4 Progress  --  New  -KG       Long Term Goals    LTG Date to Achieve  --  09/25/19  -KG    LTG 1  --  Subjectively report 60% improvement or greater  -KG    LTG 1 Progress  --  New  -KG    LTG 2  --  Improve Modified oswestry score to 20% or less  -KG    LTG 2 Progress  --  New  -KG    LTG 3  --  Demonstrate improved bilateral lumbar lat flex AROM WNL without increased pain  -KG    LTG 3 Progress  --  New  -KG    LTG 4  --  Tolerate 15 minutes of standing therex/stabilization activities without increased pain  -KG    LTG 4 Progress  --  New  -KG    LTG 5  --  Demosntrate improved L knee/hip MMT to 5/5  -KG    LTG 5 Progress  --  New  -KG    LTG 6  --  Demonstrate improved postural awareness/body mechanics throughout treatment session with minimal cuing required  -KG    LTG 6 Progress  --  New  -KG       Time Calculation    PT Goal Re-Cert Due Date  09/04/19  (Pended)   -ML  09/04/19  -KG      User Key  (r) = Recorded By, (t) = Taken By, (c) = Cosigned By    Initials Name Provider Type    Houston Denise, ATC     KG Ada Holguin, PT Physical Therapist                         Time Calculation:   Start Time: (P) 0846  Stop Time: (P) 0920  Time Calculation (min): (P) 34 min  Therapy Charges for Today     Code Description Service Date Service Provider Modifiers Qty    43261967185 HC PT THER SUPP EA 15 MIN 8/15/2019 Houston Galvan, ATC  1    28460981161 HC PT THER PROC EA 15 MIN 8/15/2019 Houston Galvan, JOSE  2                    Houston Galvan ATC  8/15/2019

## 2019-08-17 DIAGNOSIS — E78.2 MIXED HYPERLIPIDEMIA: ICD-10-CM

## 2019-08-17 DIAGNOSIS — E11.69 HYPERLIPIDEMIA ASSOCIATED WITH TYPE 2 DIABETES MELLITUS (HCC): Primary | ICD-10-CM

## 2019-08-17 DIAGNOSIS — E78.5 HYPERLIPIDEMIA ASSOCIATED WITH TYPE 2 DIABETES MELLITUS (HCC): Primary | ICD-10-CM

## 2019-08-19 ENCOUNTER — TELEPHONE (OUTPATIENT)
Dept: FAMILY MEDICINE CLINIC | Facility: CLINIC | Age: 49
End: 2019-08-19

## 2019-08-19 DIAGNOSIS — M51.26 LUMBAR HERNIATED DISC: Primary | ICD-10-CM

## 2019-08-19 DIAGNOSIS — M54.32 SCIATICA OF LEFT SIDE: ICD-10-CM

## 2019-08-19 RX ORDER — SIMVASTATIN 40 MG
TABLET ORAL
Qty: 30 TABLET | Refills: 3 | Status: SHIPPED | OUTPATIENT
Start: 2019-08-19 | End: 2019-12-26

## 2019-08-19 NOTE — TELEPHONE ENCOUNTER
----- Message from Chris Spencer MD sent at 8/19/2019  7:29 AM CDT -----  MRI shows bulging disc, and herniated disc. Placed referral to neurosurgery. Will go over at next visit.

## 2019-08-20 ENCOUNTER — APPOINTMENT (OUTPATIENT)
Dept: PHYSICAL THERAPY | Facility: HOSPITAL | Age: 49
End: 2019-08-20

## 2019-08-22 ENCOUNTER — HOSPITAL ENCOUNTER (OUTPATIENT)
Dept: PHYSICAL THERAPY | Facility: HOSPITAL | Age: 49
Setting detail: THERAPIES SERIES
Discharge: HOME | End: 2019-08-22

## 2019-08-22 DIAGNOSIS — M54.32 SCIATICA OF LEFT SIDE: Primary | ICD-10-CM

## 2019-08-22 PROCEDURE — 97110 THERAPEUTIC EXERCISES: CPT | Performed by: SPECIALIST/TECHNOLOGIST

## 2019-08-22 NOTE — THERAPY TREATMENT NOTE
Outpatient Physical Therapy Ortho Treatment Note  [facility]     Patient Name: Mine Benedict  : 1970  MRN: [id number]  Today's Date: 2019      Visit Date: 2019     [place]    Patients reports pain as:  6/10   Patient reports overall % improvement as:  20%   Patients attendance has been:  3/3   Insurance visits available  20  visits   Next MD visit is: Specialist 19    Dr Spencer  19   Next PT recertification is:  2019         Visit Dx:    ICD-10-CM ICD-9-CM   1. Sciatica of left side M54.32 724.3       Patient Active Problem List   Diagnosis   • Diabetes type 2, uncontrolled (CMS/HCC)   • Hyperlipidemia associated with type 2 diabetes mellitus (CMS/HCC)   • Essential hypertension   • Non morbid obesity   • Tobacco user   • Tobacco abuse counseling   • Encounter for immunization   • Chronic back pain   • History of back surgery   • Type 2 diabetes mellitus with complication, with long-term current use of insulin (CMS/HCC)   • Mild episode of recurrent major depressive disorder (CMS/HCC)   • Abscessed tooth   • Mixed hyperlipidemia   • Type 2 diabetes mellitus (CMS/HCC)   • High risk medication use   • Body mass index (bmi) 31.0-31.9, adult   • Need for immunization against influenza   • Depression, reactive   • Insomnia due to medical condition   • Right shoulder pain   • Diabetic neuropathy (CMS/HCC)   • Neuropathy   • Sinus problem   • Sciatica of left side   • Adult BMI 32.0-32.9 kg/sq m   • Depression        Past Medical History:   Diagnosis Date   • Diabetic neuropathy (CMS/HCC)    • Hypertensive disorder    • Lumbago     sciatica due to displacement of lumbar intervertebral disc - S/P Laminectomy      • Major depressive disorder, recurrent (CMS/HCC)     unspecified   • Tobacco dependence syndrome    • Type 2 diabetes mellitus (CMS/HCC)         Past Surgical History:   Procedure Laterality Date   • INJECTION OF MEDICATION  09/15/2015    Kenalog (1)      •  INJECTION OF MEDICATION  03/02/2016    Toradol (1)      • LUMBAR LAMINECTOMY  10/29/2014    10- had L5 S1 for lumbago, sciatica Dr. Aguilar.       PT Ortho     Row Name 08/22/19 1000       Posture/Observations    Posture/Observations Comments  no acute distress.  no signif guarding noted (except avoid trunk flexion w/ squatting to retrive items from floor).    (Pended)   -      User Key  (r) = Recorded By, (t) = Taken By, (c) = Cosigned By    Initials Name Provider Type    Houston Denise, obopay                       PT Assessment/Plan     Row Name 08/22/19 1000          PT Assessment    Assessment Comments  St. Ham S bridger better vs. seated long ham s per pt report.  emphasis for upright posture w/ ham s and avoid rounding back.  cues to stand tall and flex from hips.  pt bridger seated pro2 w/ fluid and generally above avg RPM.  intro extension series.  pt reports felt good post ERMA/press ups. pt denies application of ice for remaining pain level. advised pt able to attempt prone extensin series w/ HEP (see education tab).       (Pended)   -ML        PT Plan    Predicted Duration of Therapy Intervention (Therapy Eval)  6-8 more visits  (Pended)   -ML     PT Plan Comments  check HEP response.  sit to stand w/ ext.  (start high seat height - chair + airex pad) attempt 2x10 w/ extension series. attempt walk track.   (Pended)   -ML       User Key  (r) = Recorded By, (t) = Taken By, (c) = Cosigned By    Initials Name Provider Type    Houston Denise ATC           Modalities     Row Name 08/22/19 1000             Ice    Patient denies application of Ice  No  (Pended)   -ML      Patient reports will apply ice at home to involved area  Yes  (Pended)   -ML        User Key  (r) = Recorded By, (t) = Taken By, (c) = Cosigned By    Initials Name Provider Type    Houston Denise ATC         Exercises     Row Name 08/22/19 1000             Subjective Comments     "Subjective Comments  pt also reports he has right shoulder injury and doing exercises at home for.  just wanted staff to know, but indicated that use of UE today w/ exercises did not bother it.  pt notes that \"ab tightening and hip rolling\" (i.e LTR- DKC) irritate the nerve in hip.  \"Im only here to get my MRI, but want to move better\"  pt reports DK-C rolls \"hurt like a SOB\".  any movement with hips causes my pinched nerve pain to cause pain.   pt reports he recieved MRI report and has disc herniation at L4-L5 and to avoid too much motion at lumbar.   (Pended)   -ML         Subjective Pain    Able to rate subjective pain?  yes  (Pended)   -ML      Pre-Treatment Pain Level  6  (Pended)   -ML      Post-Treatment Pain Level  4  (Pended)   -ML      Subjective Pain Comment  --  (Pended)  centralized lumbar pain.  no radicular s/s  -ML         Aquatics    Aquatics performed?  No  (Pended)   -ML         Exercise 1    Exercise Name 1  pro2 LE   (Pended)   -ML      Time 1  10 min  (Pended)   -ML      Additional Comments  L5.0  (Pended)   -ML         Exercise 2    Exercise Name 2  prone on elbows  (Pended)   -ML      Reps 2     (Pended)   -ML      Time 2  3 min  (Pended)   -ML         Exercise 3    Exercise Name 3  prone press ups  (Pended)   -ML      Reps 3  10  (Pended)   -ML      Time 3  10 sec  (Pended)   -ML      Additional Comments  advised pain free ROM  (Pended)   -ML         Exercise 4    Exercise Name 4  prone alt hip ext  (Pended)   -ML      Reps 4  20  (Pended)   -ML         Exercise 5    Exercise Name 5  Bridges w/ add   (Pended)   -ML      Sets 5  2  (Pended)   -ML      Reps 5  10  (Pended)   -ML      Additional Comments  3 sec   (Pended)   -ML         Exercise 6    Exercise Name 6  St Ham S -both  (Pended)   -ML      Reps 6  2  (Pended)   -ML      Time 6  30 sec  (Pended)   -ML        User Key  (r) = Recorded By, (t) = Taken By, (c) = Cosigned By    Initials Name Provider Type    ML Houston Galvan, ATC "                        PT OP Goals     Row Name 08/22/19 1000          PT Short Term Goals    STG Date to Achieve  09/04/19  (Pended)   -ML     STG 1  Demonstrate independence/compliance with HEP  (Pended)   -ML     STG 1 Progress  New  (Pended)   -ML     STG 2  Tolerate 45 minute treatment session without increased pain  (Pended)   -ML     STG 2 Progress  New  (Pended)   -ML     STG 3  Demonstrate independence in isometric TrA activities throughout treatment session for functional carryover into daily activity and work performance  (Pended)   -ML     STG 3 Progress  New  (Pended)   -ML     STG 4  Demonstrate improved L hip flex/abd MMT to 4+/5  (Pended)   -ML     STG 4 Progress  New  (Pended)   -ML        Long Term Goals    LTG Date to Achieve  09/25/19  (Pended)   -ML     LTG 1  Subjectively report 60% improvement or greater  (Pended)   -ML     LTG 1 Progress  New  (Pended)   -ML     LTG 2  Improve Modified oswestry score to 20% or less  (Pended)   -ML     LTG 2 Progress  New  (Pended)   -ML     LTG 3  Demonstrate improved bilateral lumbar lat flex AROM WNL without increased pain  (Pended)   -ML     LTG 3 Progress  New  (Pended)   -ML     LTG 4  Tolerate 15 minutes of standing therex/stabilization activities without increased pain  (Pended)   -ML     LTG 4 Progress  New  (Pended)   -ML     LTG 5  Demosntrate improved L knee/hip MMT to 5/5  (Pended)   -ML     LTG 5 Progress  New  (Pended)   -ML     LTG 6  Demonstrate improved postural awareness/body mechanics throughout treatment session with minimal cuing required  (Pended)   -ML     LTG 6 Progress  New  (Pended)   -ML        Time Calculation    PT Goal Re-Cert Due Date  09/04/19  (Pended)   -ML       User Key  (r) = Recorded By, (t) = Taken By, (c) = Cosigned By    Initials Name Provider Type    Houston Denise, ATC           Therapy Education  Education Details: (P) add ERMA, prone press ups, prone alt hip ext PRN as patient  reported helpful w/ today's session. can attempt 2x10 after successful completion 3 sessions a 1x10    Given: (P) HEP, Pain management  Program: (P) New  How Provided: (P) Verbal  Provided to: (P) Patient  Level of Understanding: (P) Verbalized              Time Calculation:   Start Time: (P) 1010  Stop Time: (P) 1050  Time Calculation (min): (P) 40 min  Therapy Charges for Today     Code Description Service Date Service Provider Modifiers Qty    89182338507 HC PT THER SUPP EA 15 MIN 8/22/2019 Houston Galvan, ATC  1    [id number]  PT THER PROC EA 15 MIN 8/22/2019 Houston Galvan, ATC  3                    Houston Galvan, ATC  8/22/2019

## 2019-08-27 ENCOUNTER — HOSPITAL ENCOUNTER (OUTPATIENT)
Dept: PHYSICAL THERAPY | Facility: HOSPITAL | Age: 49
Setting detail: THERAPIES SERIES
Discharge: HOME | End: 2019-08-27

## 2019-08-27 DIAGNOSIS — M54.32 SCIATICA OF LEFT SIDE: Primary | ICD-10-CM

## 2019-08-27 PROCEDURE — 97110 THERAPEUTIC EXERCISES: CPT

## 2019-08-27 PROCEDURE — 97530 THERAPEUTIC ACTIVITIES: CPT

## 2019-08-27 NOTE — THERAPY TREATMENT NOTE
Outpatient Physical Therapy Ortho Treatment Note  [facility]     Patient Name: Mine Benedict  : 1970  MRN: [id number]  Today's Date: 2019      Visit Date: 2019  Pt reports 2/10 pain pre treatment, 2/10 pain post treatment  Reports 20% of improvement.  Attended 4/4 visits.  Insurance available: 20 visits  Next MD appt: 2019.  Recertification: 2019.  Visit Dx:    ICD-10-CM ICD-9-CM   1. Sciatica of left side M54.32 724.3       Patient Active Problem List   Diagnosis   • Diabetes type 2, uncontrolled (CMS/HCC)   • Hyperlipidemia associated with type 2 diabetes mellitus (CMS/HCC)   • Essential hypertension   • Non morbid obesity   • Tobacco user   • Tobacco abuse counseling   • Encounter for immunization   • Chronic back pain   • History of back surgery   • Type 2 diabetes mellitus with complication, with long-term current use of insulin (CMS/HCC)   • Mild episode of recurrent major depressive disorder (CMS/HCC)   • Abscessed tooth   • Mixed hyperlipidemia   • Type 2 diabetes mellitus (CMS/HCC)   • High risk medication use   • Body mass index (bmi) 31.0-31.9, adult   • Need for immunization against influenza   • Depression, reactive   • Insomnia due to medical condition   • Right shoulder pain   • Diabetic neuropathy (CMS/HCC)   • Neuropathy   • Sinus problem   • Sciatica of left side   • Adult BMI 32.0-32.9 kg/sq m   • Depression        Past Medical History:   Diagnosis Date   • Diabetic neuropathy (CMS/HCC)    • Hypertensive disorder    • Lumbago     sciatica due to displacement of lumbar intervertebral disc - S/P Laminectomy      • Major depressive disorder, recurrent (CMS/HCC)     unspecified   • Tobacco dependence syndrome    • Type 2 diabetes mellitus (CMS/HCC)         Past Surgical History:   Procedure Laterality Date   • INJECTION OF MEDICATION  09/15/2015    Kenalog (1)      • INJECTION OF MEDICATION  2016    Toradol (1)      • LUMBAR LAMINECTOMY   10/29/2014    10- had L5 S1 for lumbago, sciatica Dr. Aguilar.                       PT Assessment/Plan     Row Name 08/27/19 1200          PT Assessment    Assessment Comments  Pt inquired about aquatic therapy. PTA sent evaluating therapist a message concerning aquatics at pts request. Pt tolerated new prone ex well with heel squeezes and TKE with glutes added. EDucated pt on log rolling and the importance of engaging TA to help support the spine. PTA educated pt to back off the stretches to pain but only to pull til he feels a stretch. pt did not tolerate supine ham S secondary to calf hurting. No new goals met at this time. Will add aquatics to POC per Therapist.  -TL        PT Plan    Predicted Duration of Therapy Intervention (Therapy Eval)  6-8 visits  -TL     PT Plan Comments  Therapist agreed to aquatics by email.   -TL       User Key  (r) = Recorded By, (t) = Taken By, (c) = Cosigned By    Initials Name Provider Type    TL Esther Delgado PTA Physical Therapy Assistant            Exercises     Row Name 08/27/19 1100             Subjective Comments    Subjective Comments  pt reports that when he engages his TA muscle it hurts his back. PTA explained to do what he can but its important to be able to tighten up those muscle to help align spine.  -TL         Subjective Pain    Able to rate subjective pain?  yes  -TL      Pre-Treatment Pain Level  2  -TL         Aquatics    Aquatics performed?  No  -TL         Exercise 1    Exercise Name 1  pro2 LE   -TL      Time 1  15mins  -TL      Additional Comments  level 5.5 for strengthening  -TL         Exercise 2    Exercise Name 2  sit to stand with LX ext  -TL      Sets 2  2  -TL      Reps 2  10  -TL      Additional Comments  airex in seat  -TL         Exercise 3    Exercise Name 3  log rolling education  -TL         Exercise 4    Exercise Name 4  supine pirif S  -TL      Reps 4  2  -TL      Time 4  30 sec hold  -TL         Exercise 5    Exercise Name 5   supine Ham S  -TL      Additional Comments  attempted  -TL         Exercise 6    Exercise Name 6  SKTC S  -TL      Reps 6  2  -TL      Time 6  30 sec hold  -TL         Exercise 7    Exercise Name 7  Prone Push up  -TL      Reps 7  10  -TL      Time 7  10 sec hold  -TL         Exercise 8    Exercise Name 8  Prone TKE with glutes  -TL      Sets 8  2  -TL      Reps 8  10  -TL      Time 8  5 sec hold  -TL         Exercise 9    Exercise Name 9  prone Heel squeezes  -TL      Sets 9  2  -TL      Reps 9  10  -TL      Additional Comments  with glutes  -TL         Exercise 10    Exercise Name 10  Track walk  -TL      Reps 10  2 laps  -TL      Additional Comments  focused upright posture  -TL        User Key  (r) = Recorded By, (t) = Taken By, (c) = Cosigned By    Initials Name Provider Type    Esther Kruse, PTA Physical Therapy Assistant                       PT OP Goals     Row Name 08/27/19 1100          PT Short Term Goals    STG Date to Achieve  09/04/19  -TL     STG 1  Demonstrate independence/compliance with HEP  -TL     STG 1 Progress  New  -TL     STG 2  Tolerate 45 minute treatment session without increased pain  -TL     STG 2 Progress  New  -TL     STG 3  Demonstrate independence in isometric TrA activities throughout treatment session for functional carryover into daily activity and work performance  -TL     STG 3 Progress  New  -TL     STG 4  Demonstrate improved L hip flex/abd MMT to 4+/5  -TL     STG 4 Progress  New  -TL        Long Term Goals    LTG Date to Achieve  09/25/19  -TL     LTG 1  Subjectively report 60% improvement or greater  -TL     LTG 1 Progress  New  -TL     LTG 2  Improve Modified oswestry score to 20% or less  -TL     LTG 2 Progress  New  -TL     LTG 3  Demonstrate improved bilateral lumbar lat flex AROM WNL without increased pain  -TL     LTG 3 Progress  New  -TL     LTG 4  Tolerate 15 minutes of standing therex/stabilization activities without increased pain  -TL     LTG 4 Progress   New  -TL     LTG 5  Demosntrate improved L knee/hip MMT to 5/5  -TL     LTG 5 Progress  New  -TL     LTG 6  Demonstrate improved postural awareness/body mechanics throughout treatment session with minimal cuing required  -TL     LTG 6 Progress  New  -TL        Time Calculation    PT Goal Re-Cert Due Date  09/04/19  -TL       User Key  (r) = Recorded By, (t) = Taken By, (c) = Cosigned By    Initials Name Provider Type    TL Esther Delgado PTA Physical Therapy Assistant          Therapy Education  Education Details: log rolling techigue  Given: Symptoms/condition management, Posture/body mechanics  Program: New  How Provided: Verbal, Demonstration  Provided to: Patient  Level of Understanding: Verbalized, Demonstrated              Time Calculation:   Start Time: 1100  Stop Time: 1155  Time Calculation (min): 55 min  Total Timed Code Minutes- PT: 55 minute(s)  Therapy Charges for Today     Code Description Service Date Service Provider Modifiers Qty    13102002681  PT THER PROC EA 15 MIN 8/27/2019 Esther Delgado PTA GP 3    71719739319 HC PT THERAPEUTIC ACT EA 15 MIN 8/27/2019 Esther Delgado PTA GP 1                    Esther Delgado PTA  8/27/2019

## 2019-08-29 ENCOUNTER — HOSPITAL ENCOUNTER (OUTPATIENT)
Dept: PHYSICAL THERAPY | Facility: HOSPITAL | Age: 49
Setting detail: THERAPIES SERIES
Discharge: HOME | End: 2019-08-29

## 2019-08-29 DIAGNOSIS — M54.32 SCIATICA OF LEFT SIDE: Primary | ICD-10-CM

## 2019-08-29 PROCEDURE — 97110 THERAPEUTIC EXERCISES: CPT | Performed by: SPECIALIST/TECHNOLOGIST

## 2019-08-29 RX ORDER — INSULIN LISPRO 100 U/ML
INJECTION, SOLUTION SUBCUTANEOUS
Qty: 15 ML | Refills: 4 | Status: SHIPPED | OUTPATIENT
Start: 2019-08-29 | End: 2020-01-07 | Stop reason: SDUPTHER

## 2019-08-29 NOTE — THERAPY TREATMENT NOTE
Outpatient Physical Therapy Ortho Treatment Note  [facility]     Patient Name: Mine Benedict  : 1970  MRN: [id number]  Today's Date: 2019      Visit Date: 2019     [place]    Patients reports pain as:  3/10   Patient reports overall % improvement as:  25-35%   Patients attendance has been:  /   Insurance visits available   20 visits   Next MD visit is:  2019   Next PT recertification is:  2019         Visit Dx:    ICD-10-CM ICD-9-CM   1. Sciatica of left side M54.32 724.3       Patient Active Problem List   Diagnosis   • Diabetes type 2, uncontrolled (CMS/HCC)   • Hyperlipidemia associated with type 2 diabetes mellitus (CMS/HCC)   • Essential hypertension   • Non morbid obesity   • Tobacco user   • Tobacco abuse counseling   • Encounter for immunization   • Chronic back pain   • History of back surgery   • Type 2 diabetes mellitus with complication, with long-term current use of insulin (CMS/HCC)   • Mild episode of recurrent major depressive disorder (CMS/HCC)   • Abscessed tooth   • Mixed hyperlipidemia   • Type 2 diabetes mellitus (CMS/HCC)   • High risk medication use   • Body mass index (bmi) 31.0-31.9, adult   • Need for immunization against influenza   • Depression, reactive   • Insomnia due to medical condition   • Right shoulder pain   • Diabetic neuropathy (CMS/HCC)   • Neuropathy   • Sinus problem   • Sciatica of left side   • Adult BMI 32.0-32.9 kg/sq m   • Depression        Past Medical History:   Diagnosis Date   • Diabetic neuropathy (CMS/HCC)    • Hypertensive disorder    • Lumbago     sciatica due to displacement of lumbar intervertebral disc - S/P Laminectomy      • Major depressive disorder, recurrent (CMS/HCC)     unspecified   • Tobacco dependence syndrome    • Type 2 diabetes mellitus (CMS/HCC)         Past Surgical History:   Procedure Laterality Date   • INJECTION OF MEDICATION  09/15/2015    Kenalog (1)      • INJECTION OF MEDICATION   "03/02/2016    Toradol (1)      • LUMBAR LAMINECTOMY  10/29/2014    10- had L5 S1 for lumbago, sciatica Dr. Aguilar.                       PT Assessment/Plan     Row Name 08/29/19 1000          PT Assessment    Assessment Comments  able to complete with no reported pain change post each exercise.  able to intro core stab w/ emphasis to build hip ABd + flexion strength w/ step exercises.  pt reports able to better control R-LE.  \" left side has been out of wack since surgery- if I stand and balance too long on left I'll fall over\".  able to complete step series without incident.  advised < hip ABd ROM to provide better stability.    (Pended)   -ML        PT Plan    Predicted Duration of Therapy Intervention (Therapy Eval)  5-7 visi  (Pended)   -ML       User Key  (r) = Recorded By, (t) = Taken By, (c) = Cosigned By    Initials Name Provider Type    ML Houston Galvan, ATC             Exercises     Row Name 08/29/19 1000             Subjective Comments    Subjective Comments  rates % improve 5-10% more today. can feel better \"flexibility and such\"  (Pended)   -ML         Subjective Pain    Able to rate subjective pain?  yes  (Pended)   -ML      Pre-Treatment Pain Level  3  (Pended)   -ML      Post-Treatment Pain Level  3  (Pended)   -ML         Aquatics    Aquatics performed?  No  (Pended)   -ML         Exercise 1    Exercise Name 1  pro2 LE   (Pended)   -ML      Time 1  10 min  (Pended)   -ML      Additional Comments  L5.5  (Pended)   -ML         Exercise 2    Exercise Name 2  ERMA  (Pended)   -ML      Time 2  5 min  (Pended)   -ML         Exercise 3    Exercise Name 3  Sit to stand   (Pended)   -ML      Sets 3  2  (Pended)   -ML      Reps 3  10  (Pended)   -ML      Additional Comments  w/ Lx (seat + airex pad  (Pended)   -ML         Exercise 4    Exercise Name 4  Track Walk w/ TA   (Pended)   -ML      Reps 4  4 laps  (Pended)   -ML         Exercise 5    Exercise Name 5  Steps: Fwd step up w/ opp " "hip flex  (Pended)   -ML      Sets 5  2  (Pended)   -ML      Reps 5  10x ea  (Pended)   -ML      Additional Comments  4\" step  (Pended)   -ML         Exercise 6    Exercise Name 6  Steps: Lat step up w/ opp Hip ABd  (Pended)   -ML      Sets 6  2  (Pended)   -ML      Reps 6  10x ea  (Pended)   -ML      Additional Comments  4\" step  (Pended)   -ML         Exercise 7    Exercise Name 7  Seated : LE neural S   (Pended)   -ML      Reps 7  10   (Pended)   -ML      Time 7  5 sec   (Pended)   -ML      Additional Comments  both  (Pended)   -ML         Exercise 8    Exercise Name 8  Sit Pirif S   (Pended)   -ML      Sets 8  2  (Pended)   -ML      Time 8  30 sec   (Pended)   -ML      Additional Comments  both  (Pended)   -ML        User Key  (r) = Recorded By, (t) = Taken By, (c) = Cosigned By    Initials Name Provider Type    Houston Denise, ATC                        PT OP Goals     Row Name 08/29/19 1100 08/29/19 1000       PT Short Term Goals    STG Date to Achieve  09/04/19  (Pended)   -ML  --    STG 1  Demonstrate independence/compliance with HEP  (Pended)   -ML  --    STG 1 Progress  New  (Pended)   -ML  --    STG 2  Tolerate 45 minute treatment session without increased pain  (Pended)   -ML  --    STG 2 Progress  New  (Pended)   -ML  --    STG 3  Demonstrate independence in isometric TrA activities throughout treatment session for functional carryover into daily activity and work performance  (Pended)   -ML  --    STG 3 Progress  New  (Pended)   -ML  --    STG 4  Demonstrate improved L hip flex/abd MMT to 4+/5  (Pended)   -ML  --    STG 4 Progress  New  (Pended)   -ML  --       Long Term Goals    LTG Date to Achieve  09/25/19  (Pended)   -ML  --    LTG 1  Subjectively report 60% improvement or greater  (Pended)   -ML  --    LTG 1 Progress  New  (Pended)   -ML  --    LTG 2  Improve Modified oswestry score to 20% or less  (Pended)   -ML  --    LTG 2 Progress  New  (Pended)   -ML  --    LTG 3  " Demonstrate improved bilateral lumbar lat flex AROM WNL without increased pain  (Pended)   -ML  --    LTG 3 Progress  New  (Pended)   -ML  --    LTG 4  Tolerate 15 minutes of standing therex/stabilization activities without increased pain  (Pended)   -ML  --    LTG 4 Progress  New  (Pended)   -ML  --    LTG 5  Demosntrate improved L knee/hip MMT to 5/5  (Pended)   -ML  --    LTG 5 Progress  New  (Pended)   -ML  --    LTG 6  Demonstrate improved postural awareness/body mechanics throughout treatment session with minimal cuing required  (Pended)   -ML  --    LTG 6 Progress  New  (Pended)   -ML  --       Time Calculation    PT Goal Re-Cert Due Date  --  09/04/19  (Pended)   -ML      User Key  (r) = Recorded By, (t) = Taken By, (c) = Cosigned By    Initials Name Provider Type    Houston Denise ATC                          Time Calculation:   Start Time: (P) 1055  Stop Time: (P) 1145  Time Calculation (min): (P) 50 min  Therapy Charges for Today     Code Description Service Date Service Provider Modifiers Qty    75597045658 HC PT THER PROC EA 15 MIN 8/29/2019 Houston Galvan, ATC  3                    Houston Galvan ATC  8/29/2019

## 2019-09-04 ENCOUNTER — TREATMENT (OUTPATIENT)
Dept: PHYSICAL THERAPY | Facility: CLINIC | Age: 49
End: 2019-09-04

## 2019-09-04 DIAGNOSIS — M54.32 SCIATICA OF LEFT SIDE: Primary | ICD-10-CM

## 2019-09-04 PROCEDURE — 97530 THERAPEUTIC ACTIVITIES: CPT | Performed by: PHYSICAL THERAPIST

## 2019-09-04 PROCEDURE — 97110 THERAPEUTIC EXERCISES: CPT | Performed by: PHYSICAL THERAPIST

## 2019-09-04 NOTE — PROGRESS NOTES
"Progress note        Patient: Mine Benedict   : 1970  Diagnosis/ICD-10 Code:  Sciatica of left side [M54.32]  Referring practitioner: Chris Spencer MD  Date of Initial Visit: Type: THERAPY  Noted: 2019  Today's Date: 2019  Patient seen for 6 sessions  Dr Chamorro     Subjective:   Mine Benedict reports: MRI was bad he has 3 disc levels herniated or ruptured. Neuro consult and probable surgery.  Subjective Questionnaire: Oswestry: 15=30%  Clinical Progress: some improvement  Home Program Compliance: Yes  Treatment has included: therapeutic exercise    Subjective Evaluation    History of Present Illness    Subjective comment: Fell yesterday, lost balance. Pretty sore today.Quality of life: good    Pain  Current pain ratin    Patient Goals  Patient goals for therapy: decreased pain, increased motion, increased strength and independence with ADLs/IADLs         Objective       Active Range of Motion     Additional Active Range of Motion Details  lateral flexion to 3\" above LJL bilaterally    Strength/Myotome Testing     Left Hip   Planes of Motion   Flexion: 5    Ambulation     Ambulation: Level Surfaces   Ambulation without assistive device: independent     Assessment/Plan              PT Short Term Goals     STG Date to Achieve  19  -KG       STG 1  Demonstrate independence/compliance with HEP         STG 1 Progress  reports compliance       STG 2  Tolerate 45 minute treatment session without increased pain  -KG       STG 2 Progress  New        STG 3  Demonstrate independence in isometric TrA activities throughout treatment session for functional carryover into daily activity and work performance  -KG       STG 3 Progress  Progressing       STG 4  Demonstrate improved L hip flex/abd MMT to 4+/5  -KG       STG 4 Progress met                  Long Term Goals     LTG Date to Achieve  19  -KG       LTG 1  Subjectively report 60% improvement or greater  -KG       LTG 1 Progress  " not met       LTG 2  Improve Modified oswestry score to 20% or less  -KG       LTG 2 Progress  not met       LTG 3  Demonstrate improved bilateral lumbar lat flex AROM WNL without increased pain  -KG       LTG 3 Progress  Pain with movement none remaining at neutral       LTG 4  Tolerate 15 minutes of standing therex/stabilization activities without increased pain  -KG       LTG 4 Progress Not met 5-10  mins at time       LTG 5  Demosntrate improved L knee/hip MMT to 5/5  -KG       LTG 5 Progress  New  -KG       LTG 6  Demonstrate improved postural awareness/body mechanics throughout treatment session with minimal cuing required  -KG       LTG 6 Progress  New  -KG     Pt declined further exercise today with complaints of LE pain.  He will be scheduled for the pool on next visit. Will wait to see if pool helps before deciding what he wants to do next week.    Visit Diagnoses:    ICD-10-CM ICD-9-CM   1. Sciatica of left side M54.32 724.3       Progress toward previous goals: Partially Met      Recommendations: Continue as planned  Timeframe: 2 weeks  Prognosis to achieve goals: fair    PT Signature: Renuka Banegas, PT DPT      Timed:  Manual Therapy:   mins  75396;  Therapeutic Exercise:    15    mins  24011;     Neuromuscular Ayaka:    mins  69520;    Therapeutic Activity:    10   mins  89827;     Gait Training:      mins  18519;     Ultrasound:     mins  26792;    Electrical Stimulation:   mins  94766 ( );    Untimed:  Electrical Stimulation:     mins  49407 ( );  Mechanical Traction:     mins  32162;     Timed Treatment:  25 mins   Total Treatment:    25 mins

## 2019-09-11 ENCOUNTER — OFFICE VISIT (OUTPATIENT)
Dept: OTOLARYNGOLOGY | Facility: CLINIC | Age: 49
End: 2019-09-11

## 2019-09-11 VITALS — BODY MASS INDEX: 32.37 KG/M2 | TEMPERATURE: 97.7 F | HEIGHT: 72 IN | WEIGHT: 239 LBS

## 2019-09-11 DIAGNOSIS — J34.2 NASAL SEPTAL DEVIATION: ICD-10-CM

## 2019-09-11 DIAGNOSIS — J32.0 CHRONIC MAXILLARY SINUSITIS: Primary | ICD-10-CM

## 2019-09-11 DIAGNOSIS — J34.3 NASAL TURBINATE HYPERTROPHY: ICD-10-CM

## 2019-09-11 PROCEDURE — 99203 OFFICE O/P NEW LOW 30 MIN: CPT | Performed by: OTOLARYNGOLOGY

## 2019-09-11 RX ORDER — CEFDINIR 300 MG/1
300 CAPSULE ORAL 2 TIMES DAILY
Qty: 42 CAPSULE | Refills: 0 | Status: SHIPPED | OUTPATIENT
Start: 2019-09-11 | End: 2020-01-07

## 2019-09-11 RX ORDER — AZELASTINE HCL 205.5 UG/1
2 SPRAY NASAL 2 TIMES DAILY
Qty: 30 ML | Refills: 11 | Status: SHIPPED | OUTPATIENT
Start: 2019-09-11 | End: 2019-10-16

## 2019-09-11 NOTE — PATIENT INSTRUCTIONS

## 2019-09-11 NOTE — PROGRESS NOTES
Subjective   Mine Benedict is a 49 y.o. male.   Ear infections/ tinnitus  History of Present Illness   Patient has a history of sinus problems she is been stopped at right more than left has postnasal drip she had a plain x-ray but no CT she is had antibiotics in the past she snores  And any current fever chills  The following portions of the patient's history were reviewed and updated as appropriate: allergies, current medications, past family history, past medical history, past social history, past surgical history and problem list.      Mine Benedict reports that he has been smoking cigarettes.  He has a 36.00 pack-year smoking history. He has never used smokeless tobacco. He reports that he does not drink alcohol.  Patient is a tobacco user and has been counseled for use of tobacco products    Family History   Problem Relation Age of Onset   • Diabetes Other    • Hypertension Other          Current Outpatient Medications:   •  ADMELOG SOLOSTAR 100 UNIT/ML solution pen-injector, INJECT 5 TO 10 UNITS UNDER THE SKIN THREE TIMES A DAY WITH MEALS, Disp: 15 mL, Rfl: 4  •  clotrimazole-betamethasone (LOTRISONE) 1-0.05 % cream, Apply  topically to the appropriate area as directed 2 (Two) Times a Day. Athletes foot, Disp: 45 g, Rfl: 1  •  DULoxetine (CYMBALTA) 60 MG capsule, TAKE ONE CAPSULE BY MOUTH DAILY, Disp: 30 capsule, Rfl: 4  •  gabapentin (NEURONTIN) 800 MG tablet, Take 1 tablet by mouth 4 (Four) Times a Day., Disp: 120 tablet, Rfl: 3  •  Insulin Glargine (BASAGLAR KWIKPEN) 100 UNIT/ML injection pen, Inject 38 units under the skin nightly (Patient taking differently: Inject 50 units under the skin nightly), Disp: 15 mL, Rfl: 5  •  Insulin Pen Needle (PEN NEEDLES) 32G X 4 MM misc, 1 each 4 (Four) Times a Day. Use to inject insulin 4 times daily, Disp: 200 each, Rfl: 5  •  JARDIANCE 25 MG tablet, TAKE ONE TABLET BY MOUTH DAILY, Disp: 30 tablet, Rfl: 5  •  lisinopril (PRINIVIL,ZESTRIL) 20 MG tablet, TAKE ONE  TABLET BY MOUTH DAILY, Disp: 26 tablet, Rfl: 3  •  meloxicam (MOBIC) 15 MG tablet, Take 1 tablet by mouth Daily., Disp: 30 tablet, Rfl: 1  •  mupirocin (BACTROBAN) 2 % ointment, Apply  topically to the appropriate area as directed 3 (Three) Times a Day., Disp: 30 g, Rfl: 1  •  QUEtiapine (SEROquel) 25 MG tablet, TAKE 1 TABLET BY MOUTH NIGHTLY, Disp: 26 tablet, Rfl: 3  •  simvastatin (ZOCOR) 40 MG tablet, TAKE 1 TABLET BY MOUTH EVERY EVENING, Disp: 30 tablet, Rfl: 3  •  tiZANidine (ZANAFLEX) 4 MG tablet, Take 1 tablet by mouth 3 (Three) Times a Day., Disp: , Rfl:   •  vitamin B-12 (CYANOCOBALAMIN) 1000 MCG tablet, Take 1,000 mcg by mouth 3 (Three) Times a Week., Disp: , Rfl:   •  azelastine (ASTEPRO) 0.15 % solution nasal spray, 2 sprays into the nostril(s) as directed by provider 2 (Two) Times a Day., Disp: 30 mL, Rfl: 11  •  cefdinir (OMNICEF) 300 MG capsule, Take 1 capsule by mouth 2 (Two) Times a Day. Take with food and probiotics and call office and stop if develop watery diarrhea., Disp: 42 capsule, Rfl: 0    No Known Allergies    Past Medical History:   Diagnosis Date   • Diabetic neuropathy (CMS/HCC)    • Hypertensive disorder    • Lumbago     sciatica due to displacement of lumbar intervertebral disc - S/P Laminectomy      • Major depressive disorder, recurrent (CMS/HCC)     unspecified   • Tobacco dependence syndrome    • Type 2 diabetes mellitus (CMS/HCC)        Past Surgical History:   Procedure Laterality Date   • INJECTION OF MEDICATION  09/15/2015    Kenalog (1)      • INJECTION OF MEDICATION  03/02/2016    Toradol (1)      • LUMBAR LAMINECTOMY  10/29/2014    12- had L5 S1 for lumbago, sciatica Dr. Aguilar.       Review of Systems   Constitutional: Negative for fever.   HENT: Positive for ear discharge, ear pain, hearing loss and tinnitus.    Neurological: Negative for dizziness.   Hematological: Negative for adenopathy.   All other systems reviewed and are negative.          Objective   Physical  Exam   Constitutional: He appears well-developed and well-nourished.   HENT:   Head: Normocephalic.   Right Ear: Tympanic membrane, external ear and ear canal normal.   Left Ear: Tympanic membrane, external ear and ear canal normal.   Nose: Mucosal edema, rhinorrhea, nasal deformity and septal deviation present.   Mouth/Throat: Oropharynx is clear and moist.   Eyes: Conjunctivae are normal.   Neck: Normal range of motion.   Pulmonary/Chest: Effort normal.   Musculoskeletal: Normal range of motion.   Skin: Skin is warm.   Psychiatric: He has a normal mood and affect.   Nursing note and vitals reviewed.      Old x-ray report was reviewed actual films not available    Assessment/Plan   Mine was seen today for other.    Diagnoses and all orders for this visit:    Chronic maxillary sinusitis  -     CT Sinus Without Contrast; Future    Nasal turbinate hypertrophy    Nasal septal deviation    Other orders  -     cefdinir (OMNICEF) 300 MG capsule; Take 1 capsule by mouth 2 (Two) Times a Day. Take with food and probiotics and call office and stop if develop watery diarrhea.  -     azelastine (ASTEPRO) 0.15 % solution nasal spray; 2 sprays into the nostril(s) as directed by provider 2 (Two) Times a Day.     t given her antibiotics for the sinus infection.  Spleen medications and use and side effects and also discussed trying to shrink down the nasal swelling with the nasal spray  Is explained that she is in side effects she is to follow-up in the next month we will see what the CT shows after she is been treated and th

## 2019-09-16 RX ORDER — EMPAGLIFLOZIN 25 MG/1
TABLET, FILM COATED ORAL
Qty: 26 TABLET | Refills: 4 | Status: SHIPPED | OUTPATIENT
Start: 2019-09-16 | End: 2020-01-07 | Stop reason: SDUPTHER

## 2019-09-17 ENCOUNTER — LAB (OUTPATIENT)
Dept: LAB | Facility: HOSPITAL | Age: 49
End: 2019-09-17

## 2019-09-17 ENCOUNTER — OFFICE VISIT (OUTPATIENT)
Dept: FAMILY MEDICINE CLINIC | Facility: CLINIC | Age: 49
End: 2019-09-17

## 2019-09-17 VITALS
DIASTOLIC BLOOD PRESSURE: 78 MMHG | HEIGHT: 72 IN | WEIGHT: 239.4 LBS | SYSTOLIC BLOOD PRESSURE: 124 MMHG | BODY MASS INDEX: 32.43 KG/M2 | OXYGEN SATURATION: 99 % | HEART RATE: 95 BPM | TEMPERATURE: 98.9 F

## 2019-09-17 DIAGNOSIS — Z79.4 TYPE 2 DIABETES MELLITUS WITH COMPLICATION, WITH LONG-TERM CURRENT USE OF INSULIN (HCC): ICD-10-CM

## 2019-09-17 DIAGNOSIS — S99.922A FOOT INJURY, LEFT, INITIAL ENCOUNTER: ICD-10-CM

## 2019-09-17 DIAGNOSIS — M51.36 DDD (DEGENERATIVE DISC DISEASE), LUMBAR: ICD-10-CM

## 2019-09-17 DIAGNOSIS — G62.9 NEUROPATHY: ICD-10-CM

## 2019-09-17 DIAGNOSIS — E11.8 TYPE 2 DIABETES MELLITUS WITH COMPLICATION, WITH LONG-TERM CURRENT USE OF INSULIN (HCC): ICD-10-CM

## 2019-09-17 DIAGNOSIS — F32.9 DEPRESSION, REACTIVE: ICD-10-CM

## 2019-09-17 PROCEDURE — 83036 HEMOGLOBIN GLYCOSYLATED A1C: CPT

## 2019-09-17 PROCEDURE — 99214 OFFICE O/P EST MOD 30 MIN: CPT | Performed by: FAMILY MEDICINE

## 2019-09-17 RX ORDER — HYDROCODONE BITARTRATE AND ACETAMINOPHEN 7.5; 325 MG/1; MG/1
1 TABLET ORAL EVERY 8 HOURS PRN
Qty: 9 TABLET | Refills: 0 | Status: SHIPPED | OUTPATIENT
Start: 2019-09-17 | End: 2020-01-12

## 2019-09-17 RX ORDER — GABAPENTIN 800 MG/1
800 TABLET ORAL 4 TIMES DAILY
Qty: 120 TABLET | Refills: 3 | Status: SHIPPED | OUTPATIENT
Start: 2019-09-17 | End: 2020-01-07 | Stop reason: SDUPTHER

## 2019-09-17 NOTE — PROGRESS NOTES
Subjective    Mine Benedict is a 49 y.o. Obese white male with uncontrolled DM II, HTN, Diabetic neuropathy, Depression, Chronic Low back pain H/O Lumbar Laminectomy, and other health problems, see PMH/PSH. Pt presents for exam, to discuss acute and chronic health problems, Tx and F/U plan.     'Saw ENT Dr. Xiao, given Abx, Nasal spray. Hurt L little toe 2 wks ago, still painful, pretty sure is broken. Have appt to see Neurosurgery Dr. Cornelius on the 25th. Blood sugars have been up some. Mood has effected by pain'.    Diabetes   He has type 2 diabetes mellitus. No MedicAlert identification noted. The initial diagnosis of diabetes was made 25 years ago. Pertinent negatives for hypoglycemia include no confusion, dizziness, hunger, nervousness/anxiousness, pallor, seizures, sleepiness, speech difficulty, sweats or tremors. Associated symptoms include foot paresthesias. Pertinent negatives for diabetes include no blurred vision, no foot ulcerations, no polydipsia, no polyphagia, no polyuria and no weight loss. Hypoglycemia complications include nocturnal hypoglycemia. Pertinent negatives for hypoglycemia complications include no hospitalization, no required assistance and no required glucagon injection. Symptoms are stable. Diabetic complications include peripheral neuropathy and retinopathy. Pertinent negatives for diabetic complications include no CVA, heart disease, impotence, nephropathy or PVD. Risk factors for coronary artery disease include hypertension, obesity and tobacco exposure. Current diabetic treatment includes insulin injections. He is compliant with treatment all of the time. He is currently taking insulin pre-breakfast, pre-lunch, pre-dinner and at bedtime. Insulin injections are given by patient. Rotation sites for injection include the abdominal wall and thighs. His weight is decreasing steadily. He is following a generally healthy diet. Meal planning includes avoidance of concentrated sweets. He  has not had a previous visit with a dietitian. He participates in exercise daily. He monitors blood glucose at home 5+ x per day. He monitors urine at home <1 x per month. Blood glucose monitoring compliance is good. His home blood glucose trend is fluctuating minimally. His breakfast blood glucose is taken between 5-6 am. His breakfast blood glucose range is generally 70-90 mg/dl. His lunch blood glucose is taken between 12-1 pm. His lunch blood glucose range is generally 130-140 mg/dl. His dinner blood glucose is taken between 7-8 pm. His dinner blood glucose range is generally 130-140 mg/dl. His overall blood glucose range is 140-180 mg/dl. He does not see a podiatrist.Eye exam is not current.   Hypertension   This is a chronic problem. The current episode started more than 1 year ago. The problem is controlled. Pertinent negatives include no blurred vision, palpitations, shortness of breath or sweats. Agents associated with hypertension include NSAIDs. Risk factors for coronary artery disease include smoking/tobacco exposure, stress, obesity, male gender and diabetes mellitus. Past treatments include ACE inhibitors. Current antihypertension treatment includes ACE inhibitors. The current treatment provides significant improvement. Hypertensive end-organ damage includes retinopathy. There is no history of CVA or PVD.   Insomnia   This is a recurrent problem. The current episode started more than 1 month ago. The problem occurs daily. The problem has been waxing and waning. Pertinent negatives include no chills. Associated symptoms comments: Insomnia, mind racing. . The symptoms are aggravated by stress and smoking. He has tried relaxation (Doxepin) for the symptoms. The treatment provided mild relief.   Depression   Visit Type: follow-up  Patient presents with the following symptoms: decreased concentration, depressed mood and insomnia.  Patient is not experiencing: confusion, impotence, nervousness/anxiety,  palpitations, shortness of breath, suicidal ideas and weight loss.  Frequency of symptoms: occasionally   Severity: moderate   Sleep quality: fair  Compliance with medications:  51-75%        Neurologic Problem   The patient's pertinent negatives include no syncope. Primary symptoms comment: Diabetic Neuropathy. This is a chronic problem. The current episode started more than 1 year ago. The neurological problem developed gradually. The problem has been gradually worsening since onset. Associated symptoms include back pain. Pertinent negatives include no confusion, dizziness, light-headedness, palpitations or shortness of breath. Treatments tried: Neurontin, Cymbalta. The treatment provided moderate relief.   Back Pain   This is a recurrent problem. The current episode started more than 1 month ago. The problem occurs constantly. The problem has been gradually worsening since onset. The pain is present in the lumbar spine. The pain radiates to the left thigh. The pain is at a severity of 8/10. The pain is severe. The pain is worse during the night. The symptoms are aggravated by position. Pertinent negatives include no dysuria or weight loss. Risk factors: H/O lumbar fusion. He has tried analgesics, home exercises, heat, muscle relaxant and NSAIDs for the symptoms. The treatment provided mild relief.   Lower Extremity Issue   This is a new (Injured L little toe) problem. The current episode started 1 to 4 weeks ago. The problem occurs constantly. The problem has been waxing and waning. Pertinent negatives include no chills. The symptoms are aggravated by walking and standing. He has tried NSAIDs and ice for the symptoms. The treatment provided no relief.        The following portions of the patient's history were reviewed and updated as appropriate: allergies, current medications, past family history, past medical history, past social history, past surgical history and problem list.    Review of Systems    Constitutional: Negative.  Negative for activity change, chills and weight loss.   HENT: Negative for dental problem, ear pain and postnasal drip.         S/P extraction L lower molar.   Eyes: Negative for blurred vision, pain, discharge and visual disturbance.   Respiratory: Negative for apnea, shortness of breath and wheezing.    Cardiovascular: Negative for palpitations and leg swelling.   Gastrointestinal: Negative for blood in stool and constipation.   Endocrine: Negative for cold intolerance, heat intolerance, polydipsia, polyphagia and polyuria.   Genitourinary: Negative for discharge, dysuria and impotence.   Musculoskeletal: Positive for back pain.        Sciatica, L side flaring    Injured L little toe.   Skin: Negative for color change, pallor and wound.   Allergic/Immunologic: Negative for environmental allergies and food allergies.   Neurological: Negative for dizziness, tremors, seizures, syncope, facial asymmetry, speech difficulty and light-headedness.        Diabetic Neuropathy   Hematological: Negative.    Psychiatric/Behavioral: Positive for decreased concentration, dysphoric mood and sleep disturbance. Negative for agitation, behavioral problems, confusion, hallucinations, self-injury and suicidal ideas. The patient has insomnia. The patient is not nervous/anxious.        Objective   Physical Exam   Constitutional: He is oriented to person, place, and time. He appears well-developed and well-nourished. No distress.   HENT:   Head: Normocephalic and atraumatic.   Right Ear: External ear normal.   Left Ear: External ear normal.   Nose: Nose normal.   Mouth/Throat: Oropharynx is clear and moist.   Eyes: Conjunctivae and EOM are normal. Pupils are equal, round, and reactive to light. Right eye exhibits no discharge. Left eye exhibits no discharge. No scleral icterus.   Neck: Normal range of motion. No JVD present. No tracheal deviation present. No thyromegaly present.   Cardiovascular: Normal rate,  regular rhythm, normal heart sounds and intact distal pulses. Exam reveals no gallop and no friction rub.   No murmur heard.  Pulmonary/Chest: Effort normal and breath sounds normal. No stridor. No respiratory distress. He has no wheezes. He has no rales. He exhibits no tenderness.   Abdominal: Soft. Bowel sounds are normal. He exhibits no distension and no mass. There is no tenderness. There is no rebound and no guarding. No hernia.   Musculoskeletal: He exhibits no edema, tenderness or deformity.   WB 6 with ROM low back  L little toe swollen.    Mine had a diabetic foot exam performed today.    Neurological Sensory Findings - Altered hot/cold left ankle/foot discrimination: L little toe swollen. Unaltered sharp/dull right ankle/foot discrimination and unaltered sharp/dull left ankle/foot discrimination.  Vascular Status -  His right foot exhibits normal foot vasculature . His left foot exhibits normal foot vasculature .  Skin Integrity  -  His right foot skin is intact.His left foot skin is intact..   Foot Structure and Biomechanics -  His right foot exhibits hallux valgus.  His left foot exhibits hallux valgus.  Lymphadenopathy:     He has no cervical adenopathy.   Neurological: He is alert and oriented to person, place, and time. He has normal reflexes. He displays normal reflexes. No cranial nerve deficit. He exhibits normal muscle tone. Coordination normal.   Skin: Skin is warm and dry. Capillary refill takes 2 to 3 seconds. No rash noted. He is not diaphoretic. No erythema. No pallor.   Psychiatric: He has a normal mood and affect. His behavior is normal. Judgment and thought content normal.   Nursing note and vitals reviewed.      Assessment/Plan   Mine was seen today for back pain, diabetes, hypertension and toe injury.    Diagnoses and all orders for this visit:    Foot injury, left, initial encounter  -     XR Foot 3+ View Left (In Office)  -     HYDROcodone-acetaminophen (NORCO) 7.5-325 MG per  tablet; Take 1 tablet by mouth Every 8 (Eight) Hours As Needed for Moderate Pain .    Neuropathy  -     gabapentin (NEURONTIN) 800 MG tablet; Take 1 tablet by mouth 4 (Four) Times a Day.    Type 2 diabetes mellitus with complication, with long-term current use of insulin (CMS/[facility])  -     Hemoglobin A1c; Future    Depression, reactive    DDD (degenerative disc disease), lumbar    Discussed exam, health problems, Acute on chronic pain. Discussed safety with controlled meds. Discussed F/U with specialist. Discussed checking X-ray L little toe, Discussed F/u plan.          This document has been electronically signed by Chris Spencer MD

## 2019-09-18 LAB — HBA1C MFR BLD: 8.76 % (ref 4.8–5.6)

## 2019-09-23 ENCOUNTER — TELEPHONE (OUTPATIENT)
Dept: FAMILY MEDICINE CLINIC | Facility: CLINIC | Age: 49
End: 2019-09-23

## 2019-09-23 NOTE — TELEPHONE ENCOUNTER
----- Message from Chris Spencer MD sent at 9/19/2019  5:01 PM CDT -----  HgbA1c was 7.26 now 8.76 will recheck in 3 months.

## 2019-09-26 PROBLEM — M51.36 DDD (DEGENERATIVE DISC DISEASE), LUMBAR: Status: ACTIVE | Noted: 2019-09-26

## 2019-10-04 ENCOUNTER — TELEPHONE (OUTPATIENT)
Dept: FAMILY MEDICINE CLINIC | Facility: CLINIC | Age: 49
End: 2019-10-04

## 2019-10-04 NOTE — TELEPHONE ENCOUNTER
Returned call. No one at the facility knew why pharmacy was calling. They will have caller try again Monday.

## 2019-10-04 NOTE — TELEPHONE ENCOUNTER
Neil from [employer] left message needing to speak with you regarding patient please call neil ref# [id number]

## 2019-10-16 ENCOUNTER — OFFICE VISIT (OUTPATIENT)
Dept: OTOLARYNGOLOGY | Facility: CLINIC | Age: 49
End: 2019-10-16

## 2019-10-16 VITALS — BODY MASS INDEX: 32.37 KG/M2 | HEIGHT: 72 IN | WEIGHT: 239 LBS

## 2019-10-16 DIAGNOSIS — J34.3 NASAL TURBINATE HYPERTROPHY: Primary | ICD-10-CM

## 2019-10-16 DIAGNOSIS — J30.0 CHRONIC VASOMOTOR RHINITIS: ICD-10-CM

## 2019-10-16 DIAGNOSIS — J34.2 NASAL SEPTAL DEVIATION: ICD-10-CM

## 2019-10-16 PROCEDURE — 99213 OFFICE O/P EST LOW 20 MIN: CPT | Performed by: OTOLARYNGOLOGY

## 2019-10-16 RX ORDER — IPRATROPIUM BROMIDE 42 UG/1
2 SPRAY, METERED NASAL 3 TIMES DAILY
Qty: 15 ML | Refills: 5 | Status: SHIPPED | OUTPATIENT
Start: 2019-10-16 | End: 2020-08-03

## 2019-10-16 NOTE — PROGRESS NOTES
Subjective   Mine Benedict is a 49 y.o. male.       follow-up nasal symptoms  History of Present Illness     States his biggest complaints besides breathing through his nose is vasomotor rhinitis draining drainage down the back of his throat not of his nose he comes for review of CT scan  No fever chills or facial swelling  The following portions of the patient's history were reviewed and updated as appropriate: allergies, current medications, past family history, past medical history, past social history, past surgical history and problem list.      Current Outpatient Medications:   •  ADMELOG SOLOSTAR 100 UNIT/ML solution pen-injector, INJECT 5 TO 10 UNITS UNDER THE SKIN THREE TIMES A DAY WITH MEALS (Patient taking differently: INJECT 15 TO 20 UNITS UNDER THE SKIN THREE TIMES A DAY WITH MEALS), Disp: 15 mL, Rfl: 4  •  cefdinir (OMNICEF) 300 MG capsule, Take 1 capsule by mouth 2 (Two) Times a Day. Take with food and probiotics and call office and stop if develop watery diarrhea., Disp: 42 capsule, Rfl: 0  •  clotrimazole-betamethasone (LOTRISONE) 1-0.05 % cream, Apply  topically to the appropriate area as directed 2 (Two) Times a Day. Athletes foot, Disp: 45 g, Rfl: 1  •  DULoxetine (CYMBALTA) 60 MG capsule, TAKE ONE CAPSULE BY MOUTH DAILY, Disp: 30 capsule, Rfl: 4  •  gabapentin (NEURONTIN) 800 MG tablet, Take 1 tablet by mouth 4 (Four) Times a Day., Disp: 120 tablet, Rfl: 3  •  HYDROcodone-acetaminophen (NORCO) 7.5-325 MG per tablet, Take 1 tablet by mouth Every 8 (Eight) Hours As Needed for Moderate Pain ., Disp: 9 tablet, Rfl: 0  •  Insulin Glargine (BASAGLAR KWIKPEN) 100 UNIT/ML injection pen, Inject 38 units under the skin nightly (Patient taking differently: Inject 50 units under the skin nightly), Disp: 15 mL, Rfl: 5  •  Insulin Pen Needle (PEN NEEDLES) 32G X 4 MM misc, 1 each 4 (Four) Times a Day. Use to inject insulin 4 times daily, Disp: 200 each, Rfl: 5  •  JARDIANCE 25 MG tablet, TAKE ONE TABLET BY  MOUTH DAILY, Disp: 26 tablet, Rfl: 4  •  lisinopril (PRINIVIL,ZESTRIL) 20 MG tablet, TAKE ONE TABLET BY MOUTH DAILY, Disp: 26 tablet, Rfl: 3  •  meloxicam (MOBIC) 15 MG tablet, Take 1 tablet by mouth Daily., Disp: 30 tablet, Rfl: 1  •  mupirocin (BACTROBAN) 2 % ointment, Apply  topically to the appropriate area as directed 3 (Three) Times a Day., Disp: 30 g, Rfl: 1  •  QUEtiapine (SEROquel) 25 MG tablet, TAKE 1 TABLET BY MOUTH NIGHTLY, Disp: 26 tablet, Rfl: 3  •  simvastatin (ZOCOR) 40 MG tablet, TAKE 1 TABLET BY MOUTH EVERY EVENING, Disp: 30 tablet, Rfl: 3  •  tiZANidine (ZANAFLEX) 4 MG tablet, Take 1 tablet by mouth 3 (Three) Times a Day., Disp: , Rfl:   •  vitamin B-12 (CYANOCOBALAMIN) 1000 MCG tablet, Take 1,000 mcg by mouth 3 (Three) Times a Week., Disp: , Rfl:   •  ipratropium (ATROVENT) 0.06 % nasal spray, 2 sprays into the nostril(s) as directed by provider 3 (Three) Times a Day., Disp: 15 mL, Rfl: 5    No Known Allergies          Review of Systems   Constitutional: Negative for fever.   HENT: Negative for facial swelling, sinus pressure and sinus pain.    Allergic/Immunologic: Positive for environmental allergies.   Hematological: Negative for adenopathy.           Objective   Physical Exam   HENT:   Head: Normocephalic.   Right Ear: Hearing and external ear normal.   Left Ear: Hearing and external ear normal.   Nose: Mucosal edema and septal deviation present.   Mouth/Throat: Uvula is midline and oropharynx is clear and moist.   Eyes: Conjunctivae are normal.   Neck: Normal range of motion.   Pulmonary/Chest: Effort normal.   Neurological: He is alert.   Skin: Skin is warm.   Psychiatric: He has a normal mood and affect.   Nursing note and vitals reviewed.        She will CT scan was reviewed by me there is some deviation of the septum turbinate hypertrophy but otherwise no evidence of sinusitis  Assessment/Plan   Mine was seen today for follow-up.    Diagnoses and all orders for this visit:    Nasal  turbinate hypertrophy    Nasal septal deviation    Chronic vasomotor rhinitis    Other orders  -     ipratropium (ATROVENT) 0.06 % nasal spray; 2 sprays into the nostril(s) as directed by provider 3 (Three) Times a Day.    Not improving.     Patient states he will be occupied with back surgery in the next few months we will see him back after his recovery and he is to call if any questions in the problem explained how to use the medications does not have glaucoma or any known contraindications to its use  Findings CT reviewed with the patient

## 2019-10-16 NOTE — PATIENT INSTRUCTIONS

## 2019-11-08 ENCOUNTER — DOCUMENTATION (OUTPATIENT)
Dept: PHYSICAL THERAPY | Facility: CLINIC | Age: 49
End: 2019-11-08

## 2019-11-08 DIAGNOSIS — M54.32 SCIATICA OF LEFT SIDE: Primary | ICD-10-CM

## 2019-11-08 NOTE — PROGRESS NOTES
Discharge Summary  Discharge Summary from Physical Therapy Report      Dates  PT visit: 08-  Number of Visits: 6/7     Discharge Status of Patient: pt did not return    Goals: Partially Met    Discharge Plan: pt did not return    Comments: pt did not return    Date of Discharge: 11-        Michelle Garcia, PTA  Physical Therapist Assistant

## 2019-11-11 RX ORDER — LISINOPRIL 20 MG/1
TABLET ORAL
Qty: 30 TABLET | Refills: 2 | Status: SHIPPED | OUTPATIENT
Start: 2019-11-11 | End: 2020-02-13

## 2019-11-11 RX ORDER — QUETIAPINE FUMARATE 25 MG/1
TABLET, FILM COATED ORAL
Qty: 30 TABLET | Refills: 2 | Status: SHIPPED | OUTPATIENT
Start: 2019-11-11 | End: 2020-02-13

## 2019-11-13 ENCOUNTER — CLINICAL SUPPORT (OUTPATIENT)
Dept: FAMILY MEDICINE CLINIC | Facility: CLINIC | Age: 49
End: 2019-11-13

## 2019-11-13 DIAGNOSIS — Z23 NEED FOR IMMUNIZATION AGAINST INFLUENZA: Primary | ICD-10-CM

## 2019-11-13 PROCEDURE — 90674 CCIIV4 VAC NO PRSV 0.5 ML IM: CPT | Performed by: FAMILY MEDICINE

## 2019-11-13 PROCEDURE — 90471 IMMUNIZATION ADMIN: CPT | Performed by: FAMILY MEDICINE

## 2019-11-25 RX ORDER — DULOXETIN HYDROCHLORIDE 60 MG/1
CAPSULE, DELAYED RELEASE ORAL
Qty: 30 CAPSULE | Refills: 3 | Status: SHIPPED | OUTPATIENT
Start: 2019-11-25 | End: 2020-01-07 | Stop reason: SDUPTHER

## 2019-12-06 DIAGNOSIS — M54.32 LEFT SIDED SCIATICA: Primary | ICD-10-CM

## 2019-12-06 DIAGNOSIS — M51.26 HERNIATED LUMBAR INTERVERTEBRAL DISC: ICD-10-CM

## 2019-12-12 ENCOUNTER — TELEPHONE (OUTPATIENT)
Dept: NEUROSURGERY | Facility: CLINIC | Age: 49
End: 2019-12-12

## 2019-12-26 DIAGNOSIS — E78.2 MIXED HYPERLIPIDEMIA: ICD-10-CM

## 2019-12-26 RX ORDER — SIMVASTATIN 40 MG
TABLET ORAL
Qty: 26 TABLET | Refills: 3 | Status: SHIPPED | OUTPATIENT
Start: 2019-12-26 | End: 2020-01-07 | Stop reason: SDUPTHER

## 2020-01-07 ENCOUNTER — OFFICE VISIT (OUTPATIENT)
Dept: FAMILY MEDICINE CLINIC | Facility: CLINIC | Age: 50
End: 2020-01-07

## 2020-01-07 VITALS
HEART RATE: 96 BPM | BODY MASS INDEX: 31.53 KG/M2 | OXYGEN SATURATION: 98 % | WEIGHT: 232.8 LBS | TEMPERATURE: 98.5 F | HEIGHT: 72 IN | SYSTOLIC BLOOD PRESSURE: 130 MMHG | DIASTOLIC BLOOD PRESSURE: 80 MMHG

## 2020-01-07 DIAGNOSIS — M51.36 DDD (DEGENERATIVE DISC DISEASE), LUMBAR: ICD-10-CM

## 2020-01-07 DIAGNOSIS — G89.29 CHRONIC BILATERAL LOW BACK PAIN WITH SCIATICA, SCIATICA LATERALITY UNSPECIFIED: ICD-10-CM

## 2020-01-07 DIAGNOSIS — G62.9 NEUROPATHY: ICD-10-CM

## 2020-01-07 DIAGNOSIS — I10 ESSENTIAL HYPERTENSION: ICD-10-CM

## 2020-01-07 DIAGNOSIS — E11.65 UNCONTROLLED TYPE 2 DIABETES MELLITUS WITH HYPERGLYCEMIA (HCC): ICD-10-CM

## 2020-01-07 DIAGNOSIS — E78.2 MIXED HYPERLIPIDEMIA: ICD-10-CM

## 2020-01-07 DIAGNOSIS — Z72.0 TOBACCO USER: ICD-10-CM

## 2020-01-07 DIAGNOSIS — M54.40 CHRONIC BILATERAL LOW BACK PAIN WITH SCIATICA, SCIATICA LATERALITY UNSPECIFIED: ICD-10-CM

## 2020-01-07 PROCEDURE — 99214 OFFICE O/P EST MOD 30 MIN: CPT | Performed by: FAMILY MEDICINE

## 2020-01-07 RX ORDER — INSULIN LISPRO 100 [IU]/ML
INJECTION, SOLUTION INTRAVENOUS; SUBCUTANEOUS
Qty: 15 ML | Refills: 4 | Status: SHIPPED | OUTPATIENT
Start: 2020-01-07 | End: 2021-06-02 | Stop reason: SDUPTHER

## 2020-01-07 RX ORDER — INSULIN GLARGINE 100 [IU]/ML
50 INJECTION, SOLUTION SUBCUTANEOUS NIGHTLY
Qty: 5 PEN | Refills: 5 | Status: SHIPPED | OUTPATIENT
Start: 2020-01-07 | End: 2020-07-08

## 2020-01-07 RX ORDER — GABAPENTIN 800 MG/1
800 TABLET ORAL 4 TIMES DAILY
Qty: 120 TABLET | Refills: 3 | Status: SHIPPED | OUTPATIENT
Start: 2020-01-07 | End: 2020-05-07 | Stop reason: SDUPTHER

## 2020-01-07 RX ORDER — SIMVASTATIN 40 MG
40 TABLET ORAL EVERY EVENING
Qty: 90 TABLET | Refills: 3 | Status: SHIPPED | OUTPATIENT
Start: 2020-01-07 | End: 2021-03-15

## 2020-01-07 RX ORDER — DULOXETIN HYDROCHLORIDE 60 MG/1
60 CAPSULE, DELAYED RELEASE ORAL DAILY
Qty: 90 CAPSULE | Refills: 3 | Status: SHIPPED | OUTPATIENT
Start: 2020-01-07 | End: 2021-01-26

## 2020-01-09 ENCOUNTER — TELEPHONE (OUTPATIENT)
Dept: FAMILY MEDICINE CLINIC | Facility: CLINIC | Age: 50
End: 2020-01-09

## 2020-01-12 NOTE — PROGRESS NOTES
Subjective   Mine Benedict is a 49 y.o. Obese white male with uncontrolled DM II, HTN, Diabetic neuropathy, Depression, Chronic Low back pain H/O Lumbar Laminectomy, and other health problems, see PMH/PSH. Pt presents for exam, to discuss acute and chronic health problems, Tx and F/U plan.    ' Had difficulty getting enough insulin over past 6 months or more to titrate as directed. Back pain still limits function, Ins has denied surgery by Dr. Cornelius. B/P has been OK.  Moodiness, irritability worse due to pain'.    Diabetes   He has type 2 diabetes mellitus. No MedicAlert identification noted. The initial diagnosis of diabetes was made 27 years ago. Hypoglycemia symptoms include sleepiness and sweats. Pertinent negatives for hypoglycemia include no confusion, dizziness, headaches, hunger, mood changes, nervousness/anxiousness, pallor, seizures, speech difficulty or tremors. Associated symptoms include fatigue, foot paresthesias, polydipsia, polyuria, weakness and weight loss. Pertinent negatives for diabetes include no blurred vision, no chest pain, no foot ulcerations, no polyphagia and no visual change. Hypoglycemia complications include nocturnal hypoglycemia. Pertinent negatives for hypoglycemia complications include no blackouts, no hospitalization, no required assistance and no required glucagon injection. Symptoms are stable. Diabetic complications include impotence, peripheral neuropathy and retinopathy. Pertinent negatives for diabetic complications include no CVA, heart disease, nephropathy or PVD. Risk factors for coronary artery disease include hypertension, obesity, sedentary lifestyle, stress and tobacco exposure. Current diabetic treatment includes diet, insulin injections and oral agent (dual therapy). He is compliant with treatment all of the time. He is currently taking insulin pre-breakfast, pre-dinner and at bedtime. Insulin injections are given by patient. Rotation sites for injection include  the abdominal wall and thighs. His weight is decreasing steadily. He is following a generally healthy diet. Meal planning includes carbohydrate counting. He has not had a previous visit with a dietitian. He participates in exercise weekly. He monitors blood glucose at home 1-2 x per day. He monitors urine at home <1 x per month. Blood glucose monitoring compliance is fair. His home blood glucose trend is fluctuating minimally. His breakfast blood glucose is taken between 8-9 am. His breakfast blood glucose range is generally  mg/dl. His lunch blood glucose is taken between 12-1 pm. His lunch blood glucose range is generally 130-140 mg/dl. His dinner blood glucose is taken between 7-8 pm. His dinner blood glucose range is generally 130-140 mg/dl. His highest blood glucose is >200 mg/dl. His overall blood glucose range is 140-180 mg/dl. He does not see a podiatrist.Eye exam is not current.   Hypertension   This is a chronic problem. The current episode started more than 1 year ago. The problem is controlled. Associated symptoms include sweats. Pertinent negatives include no blurred vision, chest pain, headaches, palpitations or shortness of breath. Agents associated with hypertension include NSAIDs. Risk factors for coronary artery disease include smoking/tobacco exposure, stress, obesity, male gender and diabetes mellitus. Past treatments include ACE inhibitors. Current antihypertension treatment includes ACE inhibitors. The current treatment provides significant improvement. Hypertensive end-organ damage includes retinopathy. There is no history of CVA or PVD.   Insomnia   This is a recurrent problem. The current episode started more than 1 month ago. The problem occurs daily. The problem has been waxing and waning. Associated symptoms include fatigue and weakness. Pertinent negatives include no chest pain, chills, headaches or visual change. Associated symptoms comments: Insomnia, mind racing. . The symptoms  are aggravated by stress and smoking. He has tried relaxation (Doxepin) for the symptoms. The treatment provided mild relief.   Depression   Visit Type: follow-up  Patient presents with the following symptoms: decreased concentration, depressed mood, impotence, insomnia and weight loss.  Patient is not experiencing: confusion, nervousness/anxiety, palpitations, shortness of breath and suicidal ideas.  Frequency of symptoms: occasionally   Severity: moderate   Sleep quality: fair  Compliance with medications:  51-75%        Neurologic Problem   The patient's primary symptoms include weakness. The patient's pertinent negatives include no syncope or visual change. Primary symptoms comment: Diabetic Neuropathy. This is a chronic problem. The current episode started more than 1 year ago. The neurological problem developed gradually. The problem has been gradually worsening since onset. Associated symptoms include back pain and fatigue. Pertinent negatives include no chest pain, confusion, dizziness, headaches, light-headedness, palpitations or shortness of breath. Treatments tried: Neurontin, Cymbalta. The treatment provided moderate relief. There is no history of mood changes.   Back Pain   This is a recurrent problem. The current episode started more than 1 month ago. The problem occurs constantly. The problem has been gradually worsening since onset. The pain is present in the lumbar spine. The pain radiates to the left thigh. The pain is at a severity of 8/10. The pain is severe. The pain is worse during the night. The symptoms are aggravated by position. Associated symptoms include weakness and weight loss. Pertinent negatives include no chest pain, dysuria or headaches. Risk factors: H/O lumbar fusion. He has tried analgesics, home exercises, heat, muscle relaxant and NSAIDs for the symptoms. The treatment provided mild relief.   Lower Extremity Issue   This is a new (Injured L little toe) problem. The current  episode started 1 to 4 weeks ago. The problem occurs constantly. The problem has been waxing and waning. Associated symptoms include fatigue and weakness. Pertinent negatives include no chest pain, chills, headaches or visual change. The symptoms are aggravated by walking and standing. He has tried NSAIDs and ice for the symptoms. The treatment provided no relief.        The following portions of the patient's history were reviewed and updated as appropriate: allergies, current medications, past family history, past medical history, past social history, past surgical history and problem list.    Review of Systems   Constitutional: Positive for fatigue and weight loss. Negative for activity change and chills.   HENT: Negative for dental problem, ear pain and postnasal drip.    Eyes: Negative for blurred vision, pain, discharge and visual disturbance.   Respiratory: Negative for apnea, shortness of breath and wheezing.    Cardiovascular: Negative for chest pain, palpitations and leg swelling.   Gastrointestinal: Negative for blood in stool and constipation.   Endocrine: Positive for polydipsia and polyuria. Negative for cold intolerance, heat intolerance and polyphagia.   Genitourinary: Positive for impotence. Negative for discharge and dysuria.   Musculoskeletal: Positive for back pain.        Sciatica, L side flaring       Skin: Negative for color change, pallor and wound.   Allergic/Immunologic: Negative for environmental allergies and food allergies.   Neurological: Positive for weakness. Negative for dizziness, tremors, seizures, syncope, facial asymmetry, speech difficulty, light-headedness and headaches.        Diabetic Neuropathy   Hematological: Negative.    Psychiatric/Behavioral: Positive for decreased concentration, dysphoric mood and sleep disturbance. Negative for agitation, behavioral problems, confusion, hallucinations, self-injury and suicidal ideas. The patient has insomnia. The patient is not  nervous/anxious.        Objective   Physical Exam   Constitutional: He is oriented to person, place, and time. He appears well-developed and well-nourished. No distress.   HENT:   Head: Normocephalic and atraumatic.   Right Ear: External ear normal.   Left Ear: External ear normal.   Nose: Nose normal.   Mouth/Throat: Oropharynx is clear and moist.   Eyes: Pupils are equal, round, and reactive to light. Conjunctivae and EOM are normal. Right eye exhibits no discharge. Left eye exhibits no discharge. No scleral icterus.   Neck: Normal range of motion. No JVD present. No tracheal deviation present. No thyromegaly present.   Cardiovascular: Normal rate, regular rhythm, normal heart sounds and intact distal pulses. Exam reveals no gallop and no friction rub.   No murmur heard.  Pulmonary/Chest: Effort normal and breath sounds normal. No stridor. No respiratory distress. He has no wheezes. He has no rales. He exhibits no tenderness.   Abdominal: Soft. Bowel sounds are normal. He exhibits no distension and no mass. There is no tenderness. There is no rebound and no guarding. No hernia.   Musculoskeletal: He exhibits no edema, tenderness or deformity.   WB 6 with ROM low back  L little toe swollen.    Mine had a diabetic foot exam performed today.    Neurological Sensory Findings - Altered hot/cold left ankle/foot discrimination: L little toe swollen. Unaltered sharp/dull right ankle/foot discrimination and unaltered sharp/dull left ankle/foot discrimination.  Vascular Status -  His right foot exhibits normal foot vasculature . His left foot exhibits normal foot vasculature .  Skin Integrity  -  His right foot skin is intact.His left foot skin is intact..   Foot Structure and Biomechanics -  His right foot exhibits hallux valgus.  His left foot exhibits hallux valgus.  Lymphadenopathy:     He has no cervical adenopathy.   Neurological: He is alert and oriented to person, place, and time. He has normal reflexes. He  displays normal reflexes. No cranial nerve deficit. He exhibits normal muscle tone. Coordination normal.   Skin: Skin is warm and dry. Capillary refill takes 2 to 3 seconds. No rash noted. He is not diaphoretic. No erythema. No pallor.   Psychiatric: He has a normal mood and affect. His behavior is normal. Judgment and thought content normal.   Nursing note and vitals reviewed.      Assessment/Plan   Mine was seen today for diabetes.    Diagnoses and all orders for this visit:    Mixed hyperlipidemia  -     simvastatin (ZOCOR) 40 MG tablet; Take 1 tablet by mouth Every Evening.    Neuropathy  -     gabapentin (NEURONTIN) 800 MG tablet; Take 1 tablet by mouth 4 (Four) Times a Day.    DDD (degenerative disc disease), lumbar  -     Ambulatory Referral to Pain Management    Chronic bilateral low back pain with sciatica, sciatica laterality unspecified  -     Ambulatory Referral to Pain Management    Uncontrolled type 2 diabetes mellitus with hyperglycemia (CMS/HCC)    Essential hypertension    Tobacco user    Other orders  -     Insulin Glargine (BASAGLAR KWIKPEN) 100 UNIT/ML injection pen; Inject 50 Units under the skin into the appropriate area as directed Every Night. titrate up by 2 units every 2 days to keep bs 100-150 goal  -     Insulin Lispro, 1 Unit Dial, (ADMELOG SOLOSTAR) 100 UNIT/ML solution pen-injector; 20-30 units  -     DULoxetine (CYMBALTA) 60 MG capsule; Take 1 capsule by mouth Daily.  -     Empagliflozin (JARDIANCE) 25 MG tablet; Take 25 mg by mouth Daily.    Discussed exam, health problems, indications, Sent Rx for Insulin to cover use. Discussed referral to Pain management. Discussed smoking cessation. Discussed F/U with specialist. Discussed F/U here.          This document has been electronically signed by Chris Spencer MD

## 2020-01-12 NOTE — PATIENT INSTRUCTIONS
Steps to Quit Smoking    Smoking tobacco can be bad for your health. It can also affect almost every organ in your body. Smoking puts you and people around you at risk for many serious long-lasting (chronic) diseases. Quitting smoking is hard, but it is one of the best things that you can do for your health. It is never too late to quit.  What are the benefits of quitting smoking?  When you quit smoking, you lower your risk for getting serious diseases and conditions. They can include:  · Lung cancer or lung disease.  · Heart disease.  · Stroke.  · Heart attack.  · Not being able to have children (infertility).  · Weak bones (osteoporosis) and broken bones (fractures).  If you have coughing, wheezing, and shortness of breath, those symptoms may get better when you quit. You may also get sick less often. If you are pregnant, quitting smoking can help to lower your chances of having a baby of low birth weight.  What can I do to help me quit smoking?  Talk with your doctor about what can help you quit smoking. Some things you can do (strategies) include:  · Quitting smoking totally, instead of slowly cutting back how much you smoke over a period of time.  · Going to in-person counseling. You are more likely to quit if you go to many counseling sessions.  · Using resources and support systems, such as:  ? Online chats with a counselor.  ? Phone quitlines.  ? Printed self-help materials.  ? Support groups or group counseling.  ? Text messaging programs.  ? Mobile phone apps or applications.  · Taking medicines. Some of these medicines may have nicotine in them. If you are pregnant or breastfeeding, do not take any medicines to quit smoking unless your doctor says it is okay. Talk with your doctor about counseling or other things that can help you.  Talk with your doctor about using more than one strategy at the same time, such as taking medicines while you are also going to in-person counseling. This can help make  quitting easier.  What things can I do to make it easier to quit?  Quitting smoking might feel very hard at first, but there is a lot that you can do to make it easier. Take these steps:  · Talk to your family and friends. Ask them to support and encourage you.  · Call phone quitlines, reach out to support groups, or work with a counselor.  · Ask people who smoke to not smoke around you.  · Avoid places that make you want (trigger) to smoke, such as:  ? Bars.  ? Parties.  ? Smoke-break areas at work.  · Spend time with people who do not smoke.  · Lower the stress in your life. Stress can make you want to smoke. Try these things to help your stress:  ? Getting regular exercise.  ? Deep-breathing exercises.  ? Yoga.  ? Meditating.  ? Doing a body scan. To do this, close your eyes, focus on one area of your body at a time from head to toe, and notice which parts of your body are tense. Try to relax the muscles in those areas.  · Download or buy apps on your mobile phone or tablet that can help you stick to your quit plan. There are many free apps, such as QuitGuide from the CDC (Centers for Disease Control and Prevention). You can find more support from smokefree.gov and other websites.  This information is not intended to replace advice given to you by your health care provider. Make sure you discuss any questions you have with your health care provider.  Document Released: 10/14/2010 Document Revised: 08/15/2017 Document Reviewed: 05/03/2016  [employer] Interactive Patient Education © 2019 [employer]      Diabetes Mellitus and Standards of Medical Care  Managing diabetes (diabetes mellitus) can be complicated. Your diabetes treatment may be managed by a team of health care providers, including:  · A physician who specializes in diabetes (endocrinologist).  · A nurse practitioner or physician assistant.  · Nurses.  · A diet and nutrition specialist (registered dietitian).  · A certified diabetes educator (CDE).  · An  exercise specialist.  · A pharmacist.  · An eye doctor.  · A foot specialist (podiatrist).  · A dentist.  · A primary care provider.  · A mental health provider.  Your health care providers follow guidelines to help you get the best quality of care. The following schedule is a general guideline for your diabetes management plan. Your health care providers may give you more specific instructions.  Physical exams  Upon being diagnosed with diabetes mellitus, and each year after that, your health care provider will ask about your medical and family history. He or she will also do a physical exam. Your exam may include:  · Measuring your height, weight, and body mass index (BMI).  · Checking your blood pressure. This will be done at every routine medical visit. Your target blood pressure may vary depending on your medical conditions, your age, and other factors.  · Thyroid gland exam.  · Skin exam.  · Screening for damage to your nerves (peripheral neuropathy). This may include checking the pulse in your legs and feet and checking the level of sensation in your hands and feet.  · A complete foot exam to inspect the structure and skin of your feet, including checking for cuts, bruises, redness, blisters, sores, or other problems.  · Screening for blood vessel (vascular) problems, which may include checking the pulse in your legs and feet and checking your temperature.  Blood tests  Depending on your treatment plan and your personal needs, you may have the following tests done:  · HbA1c (hemoglobin A1c). This test provides information about blood sugar (glucose) control over the previous 2-3 months. It is used to adjust your treatment plan, if needed. This test will be done:  ? At least 2 times a year, if you are meeting your treatment goals.  ? 4 times a year, if you are not meeting your treatment goals or if treatment goals have changed.  · Lipid testing, including total, LDL, and HDL cholesterol and triglyceride  levels.  ? The goal for LDL is less than 100 mg/dL (5.5 mmol/L). If you are at high risk for complications, the goal is less than 70 mg/dL (3.9 mmol/L).  ? The goal for HDL is 40 mg/dL (2.2 mmol/L) or higher for men and 50 mg/dL (2.8 mmol/L) or higher for women. An HDL cholesterol of 60 mg/dL (3.3 mmol/L) or higher gives some protection against heart disease.  ? The goal for triglycerides is less than 150 mg/dL (8.3 mmol/L).  · Liver function tests.  · Kidney function tests.  · Thyroid function tests.  Dental and eye exams  · Visit your dentist two times a year.  · If you have type 1 diabetes, your health care provider may recommend an eye exam 3-5 years after you are diagnosed, and then once a year after your first exam.  ? For children with type 1 diabetes, a health care provider may recommend an eye exam when your child is age 10 or older and has had diabetes for 3-5 years. After the first exam, your child should get an eye exam once a year.  · If you have type 2 diabetes, your health care provider may recommend an eye exam as soon as you are diagnosed, and then once a year after your first exam.  Immunizations    · The yearly flu (influenza) vaccine is recommended for everyone 6 months or older who has diabetes.  · The pneumonia (pneumococcal) vaccine is recommended for everyone 2 years or older who has diabetes. If you are 65 or older, you may get the pneumonia vaccine as a series of two separate shots.  · The hepatitis B vaccine is recommended for adults shortly after being diagnosed with diabetes.  · Adults and children with diabetes should receive all other vaccines according to age-specific recommendations from the Centers for Disease Control and Prevention (CDC).  Mental and emotional health  Screening for symptoms of eating disorders, anxiety, and depression is recommended at the time of diagnosis and afterward as needed. If your screening shows that you have symptoms (positive screening result), you may  need more evaluation and you may work with a mental health care provider.  Treatment plan  Your treatment plan will be reviewed at every medical visit. You and your health care provider will discuss:  · How you are taking your medicines, including insulin.  · Any side effects you are experiencing.  · Your blood glucose target goals.  · The frequency of your blood glucose monitoring.  · Lifestyle habits, such as activity level as well as tobacco, alcohol, and substance use.  Diabetes self-management education  Your health care provider will assess how well you are monitoring your blood glucose levels and whether you are taking your insulin correctly. He or she may refer you to:  · A certified diabetes educator to manage your diabetes throughout your life, starting at diagnosis.  · A registered dietitian who can create or review your personal nutrition plan.  · An exercise specialist who can discuss your activity level and exercise plan.  Summary  · Managing diabetes (diabetes mellitus) can be complicated. Your diabetes treatment may be managed by a team of health care providers.  · Your health care providers follow guidelines in order to help you get the best quality of care.  · Standards of care including having regular physical exams, blood tests, blood pressure monitoring, immunizations, screening tests, and education about how to manage your diabetes.  · Your health care providers may also give you more specific instructions based on your individual health.  This information is not intended to replace advice given to you by your health care provider. Make sure you discuss any questions you have with your health care provider.  Document Released: 10/15/2010 Document Revised: 09/06/2019 Document Reviewed: 09/15/2017  [employer] Interactive Patient Education © 2019 [employer]      Preventive Care 40-64 Years Old, Female  Preventive care refers to visits with your health care provider and lifestyle choices that can  promote health and wellness. This includes:  · A yearly physical exam. This may also be called an annual well check.  · Regular dental visits and eye exams.  · Immunizations.  · Screening for certain conditions.  · Healthy lifestyle choices, such as eating a healthy diet, getting regular exercise, not using drugs or products that contain nicotine and tobacco, and limiting alcohol use.  What can I expect for my preventive care visit?  Physical exam  Your health care provider will check your:  · Height and weight. This may be used to calculate body mass index (BMI), which tells if you are at a healthy weight.  · Heart rate and blood pressure.  · Skin for abnormal spots.  Counseling  Your health care provider may ask you questions about your:  · Alcohol, tobacco, and drug use.  · Emotional well-being.  · Home and relationship well-being.  · Sexual activity.  · Eating habits.  · Work and work environment.  · Method of birth control.  · Menstrual cycle.  · Pregnancy history.  What immunizations do I need?    Influenza (flu) vaccine  · This is recommended every year.  Tetanus, diphtheria, and pertussis (Tdap) vaccine  · You may need a Td booster every 10 years.  Varicella (chickenpox) vaccine  · You may need this if you have not been vaccinated.  Zoster (shingles) vaccine  · You may need this after age 60.  Measles, mumps, and rubella (MMR) vaccine  · You may need at least one dose of MMR if you were born in 1957 or later. You may also need a second dose.  Pneumococcal conjugate (PCV13) vaccine  · You may need this if you have certain conditions and were not previously vaccinated.  Pneumococcal polysaccharide (PPSV23) vaccine  · You may need one or two doses if you smoke cigarettes or if you have certain conditions.  Meningococcal conjugate (MenACWY) vaccine  · You may need this if you have certain conditions.  Hepatitis A vaccine  · You may need this if you have certain conditions or if you travel or work in places where  you may be exposed to hepatitis A.  Hepatitis B vaccine  · You may need this if you have certain conditions or if you travel or work in places where you may be exposed to hepatitis B.  Haemophilus influenzae type b (Hib) vaccine  · You may need this if you have certain conditions.  Human papillomavirus (HPV) vaccine  · If recommended by your health care provider, you may need three doses over 6 months.  You may receive vaccines as individual doses or as more than one vaccine together in one shot (combination vaccines). Talk with your health care provider about the risks and benefits of combination vaccines.  What tests do I need?  Blood tests  · Lipid and cholesterol levels. These may be checked every 5 years, or more frequently if you are over 50 years old.  · Hepatitis C test.  · Hepatitis B test.  Screening  · Lung cancer screening. You may have this screening every year starting at age 55 if you have a 30-pack-year history of smoking and currently smoke or have quit within the past 15 years.  · Colorectal cancer screening. All adults should have this screening starting at age 50 and continuing until age 75. Your health care provider may recommend screening at age 45 if you are at increased risk. You will have tests every 1-10 years, depending on your results and the type of screening test.  · Diabetes screening. This is done by checking your blood sugar (glucose) after you have not eaten for a while (fasting). You may have this done every 1-3 years.  · Mammogram. This may be done every 1-2 years. Talk with your health care provider about when you should start having regular mammograms. This may depend on whether you have a family history of breast cancer.  · BRCA-related cancer screening. This may be done if you have a family history of breast, ovarian, tubal, or peritoneal cancers.  · Pelvic exam and Pap test. This may be done every 3 years starting at age 21. Starting at age 30, this may be done every 5 years  if you have a Pap test in combination with an HPV test.  Other tests  · Sexually transmitted disease (STD) testing.  · Bone density scan. This is done to screen for osteoporosis. You may have this scan if you are at high risk for osteoporosis.  Follow these instructions at home:  Eating and drinking  · Eat a diet that includes fresh fruits and vegetables, whole grains, lean protein, and low-fat dairy.  · Take vitamin and mineral supplements as recommended by your health care provider.  · Do not drink alcohol if:  ? Your health care provider tells you not to drink.  ? You are pregnant, may be pregnant, or are planning to become pregnant.  · If you drink alcohol:  ? Limit how much you have to 0-1 drink a day.  ? Be aware of how much alcohol is in your drink. In the [place] one drink equals one 12 oz bottle of beer (355 mL), one 5 oz glass of wine (148 mL), or one 1½ oz glass of hard liquor (44 mL).  Lifestyle  · Take daily care of your teeth and gums.  · Stay active. Exercise for at least 30 minutes on 5 or more days each week.  · Do not use any products that contain nicotine or tobacco, such as cigarettes, e-cigarettes, and chewing tobacco. If you need help quitting, ask your health care provider.  · If you are sexually active, practice safe sex. Use a condom or other form of birth control (contraception) in order to prevent pregnancy and STIs (sexually transmitted infections).  · If told by your health care provider, take low-dose aspirin daily starting at age 50.  What's next?  · Visit your health care provider once a year for a well check visit.  · Ask your health care provider how often you should have your eyes and teeth checked.  · Stay up to date on all vaccines.  This information is not intended to replace advice given to you by your health care provider. Make sure you discuss any questions you have with your health care provider.  Document Released: 01/13/2017 Document Revised: 08/29/2019 Document Reviewed:  08/29/2019  [employer] Interactive Patient Education © 2019 [employer]

## 2020-02-13 RX ORDER — LISINOPRIL 20 MG/1
TABLET ORAL
Qty: 30 TABLET | Refills: 2 | Status: SHIPPED | OUTPATIENT
Start: 2020-02-13 | End: 2020-05-11

## 2020-02-13 RX ORDER — QUETIAPINE FUMARATE 25 MG/1
TABLET, FILM COATED ORAL
Qty: 30 TABLET | Refills: 2 | Status: SHIPPED | OUTPATIENT
Start: 2020-02-13 | End: 2020-05-11

## 2020-03-10 ENCOUNTER — OFFICE VISIT (OUTPATIENT)
Dept: NEUROSURGERY | Facility: CLINIC | Age: 50
End: 2020-03-10

## 2020-03-10 ENCOUNTER — HOSPITAL ENCOUNTER (OUTPATIENT)
Dept: GENERAL RADIOLOGY | Facility: HOSPITAL | Age: 50
Discharge: HOME | End: 2020-03-10
Admitting: NURSE PRACTITIONER

## 2020-03-10 VITALS — BODY MASS INDEX: 31.42 KG/M2 | WEIGHT: 232 LBS | HEIGHT: 72 IN

## 2020-03-10 DIAGNOSIS — F17.210 CIGARETTE SMOKER: ICD-10-CM

## 2020-03-10 DIAGNOSIS — M79.605 LEFT LEG PAIN: ICD-10-CM

## 2020-03-10 DIAGNOSIS — M54.50 LUMBAR PAIN: Primary | ICD-10-CM

## 2020-03-10 DIAGNOSIS — R20.0 NUMBNESS AND TINGLING OF BOTH LOWER EXTREMITIES: ICD-10-CM

## 2020-03-10 DIAGNOSIS — R20.2 NUMBNESS AND TINGLING OF BOTH LOWER EXTREMITIES: ICD-10-CM

## 2020-03-10 DIAGNOSIS — M54.50 LUMBAR PAIN: ICD-10-CM

## 2020-03-10 PROCEDURE — 99204 OFFICE O/P NEW MOD 45 MIN: CPT | Performed by: NURSE PRACTITIONER

## 2020-03-10 PROCEDURE — 72114 X-RAY EXAM L-S SPINE BENDING: CPT

## 2020-03-10 NOTE — PROGRESS NOTES
"Primary Care Provider: Chris Spencer MD  Requesting Provider: Chris Spencer MD    Chief Complaint:   Chief Complaint   Patient presents with   • Back Pain     Patient is here today for back pain and numbness and tingling in both legs with right leg pain. Patient brought cd for Thong to review.     History of Present Illness  Consultation today at the request of Chris Spencer MD    HPI:  Mine Benedict is a 50 y.o. male who presents today with a complaint of lumbar back and left leg pain; 85% back, 15% left leg.  Previous left L5-S1 discectomy by Dr. Pino out of [place], [place] in 2014 for treatment of predominantly left leg pain.  He states he was pain-free for approximately 18 months postoperatively.  No known injury.    Gradual and progressive onset of worsening back and left leg pain since [id number].  He currently reports constant lumbar back pain that waxes and wanes in severity.  He describes his lower back discomfort as a constant \"deep ache with occasional sharp stabbing pains\" with intermittent radicular pain to the posterior lateral left thigh to the knee, rarely extending past the knee to the ankle.  He additionally reports chronic numbness and paresthesias to the bilateral feet in a stocking and glove distribution from the mid leg as a result of chronic peripheral neuropathy.  He states his back discomfort worsens while changing positions, walking, and with physical activity and decreases to some extent with sitting, standing, forward flexion, and use of Neurontin, tizanidine, and Cymbalta which he obtains from his PCP.  He denies fevers, chills, night sweats, unexplained weight loss, gait or balance instabilities, saddle anesthesia, or bowel or bladder dysfunction.  He currently rates the severity of his symptoms 9/10.  No additional concerns at this time.    Mr. Benedict has not recently completed nor participated in a dedicated course of physician directed physical " therapy.  He does not participate in massage and or chiropractic care.  He states he is scheduled to be evaluated by a local pain management group in April 2020.    Oswestry Disability Index (Saulsbury et al, 1980)   Score   Pain Intensity 3   Personal Care 0   Lifting 2   Walking 3   Sitting 0   Standing 4   Sleeping 2   Sex Life (if applicable) 4   Social Life 3   Traveling 2   Previous Treatment Yes   TOTAL = Score x2  (or 2.22 if one NA) 46     SCORE INTERPRETATION OF THE OSWESTRY LBP DISABILITY QUESTIONNAIRE   40-60% Severe disability Pain remains the main problem in this group of patients, but travel, personal care, social life, sexual activity, and sleep are also affected.  These patients require detailed investigation.     ROS  Review of Systems   Constitutional: Positive for activity change, fatigue and unexpected weight change (gain). Negative for fever.   HENT: Negative.    Eyes: Negative.    Respiratory: Negative.    Cardiovascular: Negative.  Negative for chest pain.   Gastrointestinal: Positive for diarrhea. Negative for abdominal pain.   Endocrine: Negative.    Genitourinary: Negative.  Negative for dysuria.   Musculoskeletal: Positive for back pain.   Skin: Negative.    Allergic/Immunologic: Positive for immunocompromised state.   Neurological: Positive for weakness and numbness. Negative for headaches.   Hematological: Negative.    Psychiatric/Behavioral: Positive for sleep disturbance.       Past Medical History:   Diagnosis Date   • Diabetic neuropathy (CMS/HCC)    • Hypertensive disorder    • Lumbago     sciatica due to displacement of lumbar intervertebral disc - S/P Laminectomy      • Major depressive disorder, recurrent (CMS/HCC)     unspecified   • Tobacco dependence syndrome    • Type 2 diabetes mellitus (CMS/HCC)      Past Surgical History:   Procedure Laterality Date   • INJECTION OF MEDICATION  09/15/2015    Kenalog (1)      • INJECTION OF MEDICATION  03/02/2016    Toradol (1)      •  LUMBAR LAMINECTOMY  10/29/2014    10- had L5 S1 for lumbago, sciatica Dr. Aguilar.   • SPINE SURGERY  10/30/2014     Family History: family history includes Diabetes in his mother and another family member; Hypertension in his father, mother, and another family member; Other in his sister.    Social History:  reports that he has been smoking cigarettes. He has a 24.00 pack-year smoking history. He has never used smokeless tobacco. He reports that he does not drink alcohol or use drugs.    Medications:    Current Outpatient Medications:   •  clotrimazole-betamethasone (LOTRISONE) 1-0.05 % cream, Apply  topically to the appropriate area as directed 2 (Two) Times a Day. Athletes foot, Disp: 45 g, Rfl: 1  •  DULoxetine (CYMBALTA) 60 MG capsule, Take 1 capsule by mouth Daily., Disp: 90 capsule, Rfl: 3  •  Empagliflozin (JARDIANCE) 25 MG tablet, Take 25 mg by mouth Daily., Disp: 30 tablet, Rfl: 4  •  gabapentin (NEURONTIN) 800 MG tablet, Take 1 tablet by mouth 4 (Four) Times a Day., Disp: 120 tablet, Rfl: 3  •  Insulin Glargine (BASAGLAR KWIKPEN) 100 UNIT/ML injection pen, Inject 50 Units under the skin into the appropriate area as directed Every Night. titrate up by 2 units every 2 days to keep bs 100-150 goal, Disp: 5 pen, Rfl: 5  •  Insulin Lispro, 1 Unit Dial, (ADMELOG SOLOSTAR) 100 UNIT/ML solution pen-injector, 20-30 units, Disp: 15 mL, Rfl: 4  •  Insulin Pen Needle (PEN NEEDLES) 32G X 4 MM misc, 1 each 4 (Four) Times a Day. Use to inject insulin 4 times daily, Disp: 200 each, Rfl: 5  •  ipratropium (ATROVENT) 0.06 % nasal spray, 2 sprays into the nostril(s) as directed by provider 3 (Three) Times a Day., Disp: 15 mL, Rfl: 5  •  lisinopril (PRINIVIL,ZESTRIL) 20 MG tablet, TAKE ONE TABLET BY MOUTH DAILY, Disp: 30 tablet, Rfl: 2  •  mupirocin (BACTROBAN) 2 % ointment, Apply  topically to the appropriate area as directed 3 (Three) Times a Day., Disp: 30 g, Rfl: 1  •  QUEtiapine (SEROquel) 25 MG tablet, TAKE 1  "TABLET BY MOUTH NIGHTLY, Disp: 30 tablet, Rfl: 2  •  simvastatin (ZOCOR) 40 MG tablet, Take 1 tablet by mouth Every Evening., Disp: 90 tablet, Rfl: 3  •  tiZANidine (ZANAFLEX) 4 MG tablet, Take 1 tablet by mouth 3 (Three) Times a Day., Disp: , Rfl:   •  vitamin B-12 (CYANOCOBALAMIN) 1000 MCG tablet, Take 1,000 mcg by mouth 3 (Three) Times a Week., Disp: , Rfl:     Allergies:  Patient has no known allergies.    Objective   Ht 182.9 cm (72\")   Wt 105 kg (232 lb)   BMI 31.46 kg/m²   Physical Exam   Constitutional: He is oriented to person, place, and time. Vital signs are normal. He appears well-developed and well-nourished. He is cooperative.  Non-toxic appearance. He does not have a sickly appearance. He does not appear ill. No distress.   BMI 31.5   HENT:   Head: Normocephalic and atraumatic.   Right Ear: Hearing normal.   Left Ear: Hearing normal.   Mouth/Throat: Mucous membranes are normal.   Eyes: Pupils are equal, round, and reactive to light. Conjunctivae and EOM are normal.   Neck: Trachea normal and full passive range of motion without pain. Neck supple.   Cardiovascular: Normal rate and regular rhythm.   Pulmonary/Chest: Effort normal. No accessory muscle usage. No apnea, no tachypnea and no bradypnea. No respiratory distress.   Abdominal: Soft. Normal appearance.   Neurological: He is alert and oriented to person, place, and time. GCS eye subscore is 4. GCS verbal subscore is 5. GCS motor subscore is 6.   Reflex Scores:       Tricep reflexes are 2+ on the right side and 2+ on the left side.       Bicep reflexes are 2+ on the right side and 2+ on the left side.       Brachioradialis reflexes are 2+ on the right side and 2+ on the left side.       Patellar reflexes are 2+ on the right side and 2+ on the left side.       Achilles reflexes are 2+ on the right side and 0 on the left side.  Skin: Skin is warm, dry and intact. He is not diaphoretic.   Psychiatric: He has a normal mood and affect. His speech is " normal and behavior is normal.   Nursing note and vitals reviewed.    Neurologic Exam     Mental Status   Oriented to person, place, and time.   Attention: normal. Concentration: normal.   Speech: speech is normal   Level of consciousness: alert    Cranial Nerves     CN II   Visual fields full to confrontation.     CN III, IV, VI   Pupils are equal, round, and reactive to light.  Extraocular motions are normal.     CN V   Facial sensation intact.     CN VII   Facial expression full, symmetric.     CN VIII   CN VIII normal.     CN IX, X   CN IX normal.     CN XI   CN XI normal.     Motor Exam   Muscle bulk: normal  Overall muscle tone: normal  Right arm tone: normal  Left arm tone: normal  Right arm pronator drift: absent  Left arm pronator drift: absent  Right leg tone: normal  Left leg tone: normal    Strength   Right deltoid: 5/5  Left deltoid: 5/5  Right biceps: 5/5  Left biceps: 5/5  Right triceps: 5/5  Left triceps: 5/5  Right wrist extension: 5/5  Left wrist extension: 5/5  Right iliopsoas: 5/5  Left iliopsoas: 5/5  Right quadriceps: 5/5  Left quadriceps: 5/5  Right anterior tibial: 5/5  Left anterior tibial: 5/5  Right posterior tibial: 5/5  Left posterior tibial: 5/5  Giveaway weakness of the left IP and quadricep secondary to pain     Sensory Exam   Right arm light touch: normal  Left arm light touch: normal  Right leg light touch: decreased from knee  Left leg light touch: decreased from knee    Gait, Coordination, and Reflexes     Tremor   Resting tremor: absent  Intention tremor: absent  Action tremor: absent    Reflexes   Right brachioradialis: 2+  Left brachioradialis: 2+  Right biceps: 2+  Left biceps: 2+  Right triceps: 2+  Left triceps: 2+  Right patellar: 2+  Left patellar: 2+  Right achilles: 2+  Left achilles: 0  Right : 4+  Left : 4+  Right plantar: equivocal  Left plantar: equivocal  Right Pineda: absent  Left Pineda: absent  Right ankle clonus: absent  Left ankle clonus:  "absent  Right pendular knee jerk: absent  Left pendular knee jerk: absent  Antalgic gait, able to maintain gait on heels and toes     Imaging: (independent review and interpretation)  11/2013 preoperative MRI      8/16/2019 MRI of the lumbar spine shows no acute fractures, no significant STIR signal change, the conus terminates at normal level, Modic endplate changes/Schmorl node to the inferior endplate of L5, subtle anterolisthesis of L4 over L5, multilevel facet arthropathy and ligamentous flavum hypertrophy, left lateral disc herniation at L5-S1 resulting in central canal narrowing and left foraminal stenosis.       ASSESSMENT and PLAN  Mine Benedict is a 50 y.o. male with a significant medical history of a prior left discectomy at L5-S1, type 2 diabetes with peripheral neuropathy, hypertension, depression, tobacco abuse with a 24-pack-year history, and obesity.  He presents today with a complaint of lumbar back and left posterior lateral thigh pain, 85% back, 15% left leg.  OLMAN: 48.  Physical exam findings of a positive left radial leg raise, left IP and quadricep giveaway weakness secondary to pain, decreased sensation below the knees bilaterally, absent left Achilles reflex, equivocal plantar reflexes bilaterally, and an antalgic gait, however he is capable of maintaining his gait on his heels and toes.  His imaging shows anterolisthesis of L4 over L5, multilevel facet arthropathy and ligamentous flavum hypertrophy, left lateral disc herniation at L5-S1 resulting in central canal narrowing and left foraminal stenosis.    TREATMENT RECOMMENDATIONS ...  Lumbar back pain  Left leg pain  X-rays of the lumbar spine complete with flexion and extension to assess for instability.  PT/OT, prescription provided to patient.  Mr. Benedict informed me that he has completed many courses of physical therapy in the past without relief in his discomfort, stating, \"Physical therapy only made my back pain worse\".  Despite my " attempts to explain our treatment process, Mr. Benedict was very adamant that he did not want to participate in any additional courses of physical therapy.  Given his unwillingness to proceed with physical therapy and the age of Mr. Hutchinson last MRI, we will attempt to proceed by obtaining a repeat MRI of the lumbar spine to assess for worsening left disc herniation at L5-S1 and need for an additional surgical intervention.  I discussed with the patient that images obtained are based on insurance approval and may require a dedicated course of physician directed physical therapy and/or occupational therapy before advanced imaging may be obtained.  Chiropractic and/or massage as tolerated  Continue current medications of the care and directions of Dr. Spencer.  B/R/AE and use discussed.  I recommended he keep his previously scheduled appointment with pain management for assessment and treatment of his chronic lumbar back pain.  Once all testing is complete we will have Mr. Benedict follow-up with Dr. Ayala for reassessment and to discuss the need for surgical intervention.    I advised the patient to call and return sooner for new or worsening complaints of weakness, paresthesias, gait disturbances, or any additional concerns.  Treatment options discussed in detail with Mine and he voiced understanding.  Mr. Benedict agrees with this plan of care.    Tobacco abuse  The patient understands the many dangers of continuing to use tobacco. Despite this, Mr. Benedict states quitting is not an immediate priority at this time and declines to discuss tobacco cessation.  I reminded the patient that if quitting becomes an increased priority to contact us for help with quitting.       Obese Class I: 30-34.9kg/m2  Body mass index is 31.46 kg/m².  Information on the DASH diet provided in the AVS.  We will continue to provided diet and exercise information with the goal of weight loss at each scheduled appointment.     Mine was seen  today for back pain.    Diagnoses and all orders for this visit:    Lumbar pain  -     XR Spine Lumbar Complete With Flex & Ext; Future  -     MRI Lumbar Spine Without Contrast; Future    Left leg pain  -     MRI Lumbar Spine Without Contrast; Future    Numbness and tingling of both lower extremities    Body mass index (bmi) 31.0-31.9, adult    Cigarette smoker      Return for Follow up with Dr Ayala next available.    Thank you for this Consultation and the opportunity to participate in Mine's care.    Sincerely,  Thong Esparza, APRN    Level of Risk: Moderate due to: undiagnosed new problem  MDM: Moderate Complexity  (Mod = 78726, High = 46283)

## 2020-03-11 ENCOUNTER — OFFICE VISIT (OUTPATIENT)
Dept: OTOLARYNGOLOGY | Facility: CLINIC | Age: 50
End: 2020-03-11

## 2020-03-11 VITALS — TEMPERATURE: 97.6 F | BODY MASS INDEX: 30.48 KG/M2 | WEIGHT: 230 LBS | HEIGHT: 73 IN

## 2020-03-11 DIAGNOSIS — J30.0 CHRONIC VASOMOTOR RHINITIS: Primary | ICD-10-CM

## 2020-03-11 NOTE — PROGRESS NOTES
Patient comes in and I started asking his history and he says he is not doing well with the Flonase that he was on and quit using it.  He said because because it was not helping him and his treatment is not helpful he did not want to be seen or be examined.       I note that he is on it was not on Flonase most recently but on Atrovent for drainage.      He did not want to continue when I was interviewing about what happened since he was last seen in the office he did not want to answer questions the  that he was upset in the waiting room prior to being seen in though he was seen within 15 minutes of his appointment .    The  as well as the MA said that he was upset towards them.  He decided he did not want a be seen in the so I told him would be happy to see him at some other time if he was this was not a good time for him.

## 2020-03-11 NOTE — PATIENT INSTRUCTIONS
"DASH Eating Plan  DASH stands for \"Dietary Approaches to Stop Hypertension.\" The DASH eating plan is a healthy eating plan that has been shown to reduce high blood pressure (hypertension). It may also reduce your risk for type 2 diabetes, heart disease, and stroke. The DASH eating plan may also help with weight loss.  What are tips for following this plan?    General guidelines  · Avoid eating more than 2,300 mg (milligrams) of salt (sodium) a day. If you have hypertension, you may need to reduce your sodium intake to 1,500 mg a day.  · Limit alcohol intake to no more than 1 drink a day for nonpregnant women and 2 drinks a day for men. One drink equals 12 oz of beer, 5 oz of wine, or 1½ oz of hard liquor.  · Work with your health care provider to maintain a healthy body weight or to lose weight. Ask what an ideal weight is for you.  · Get at least 30 minutes of exercise that causes your heart to beat faster (aerobic exercise) most days of the week. Activities may include walking, swimming, or biking.  · Work with your health care provider or diet and nutrition specialist (dietitian) to adjust your eating plan to your individual calorie needs.  Reading food labels    · Check food labels for the amount of sodium per serving. Choose foods with less than 5 percent of the Daily Value of sodium. Generally, foods with less than 300 mg of sodium per serving fit into this eating plan.  · To find whole grains, look for the word \"whole\" as the first word in the ingredient list.  Shopping  · Buy products labeled as \"low-sodium\" or \"no salt added.\"  · Buy fresh foods. Avoid canned foods and premade or frozen meals.  Cooking  · Avoid adding salt when cooking. Use salt-free seasonings or herbs instead of table salt or sea salt. Check with your health care provider or pharmacist before using salt substitutes.  · Do not kelly foods. Cook foods using healthy methods such as baking, boiling, grilling, and broiling instead.  · Cook with " heart-healthy oils, such as olive, canola, soybean, or sunflower oil.  Meal planning  · Eat a balanced diet that includes:  ? 5 or more servings of fruits and vegetables each day. At each meal, try to fill half of your plate with fruits and vegetables.  ? Up to 6-8 servings of whole grains each day.  ? Less than 6 oz of lean meat, poultry, or fish each day. A 3-oz serving of meat is about the same size as a deck of cards. One egg equals 1 oz.  ? 2 servings of low-fat dairy each day.  ? A serving of nuts, seeds, or beans 5 times each week.  ? Heart-healthy fats. Healthy fats called Omega-3 fatty acids are found in foods such as flaxseeds and coldwater fish, like sardines, salmon, and mackerel.  · Limit how much you eat of the following:  ? Canned or prepackaged foods.  ? Food that is high in trans fat, such as fried foods.  ? Food that is high in saturated fat, such as fatty meat.  ? Sweets, desserts, sugary drinks, and other foods with added sugar.  ? Full-fat dairy products.  · Do not salt foods before eating.  · Try to eat at least 2 vegetarian meals each week.  · Eat more home-cooked food and less restaurant, buffet, and fast food.  · When eating at a restaurant, ask that your food be prepared with less salt or no salt, if possible.  What foods are recommended?  The items listed may not be a complete list. Talk with your dietitian about what dietary choices are best for you.  Grains  Whole-grain or whole-wheat bread. Whole-grain or whole-wheat pasta. Brown rice. Oatmeal. Quinoa. Bulgur. Whole-grain and low-sodium cereals. Sophy bread. Low-fat, low-sodium crackers. Whole-wheat flour tortillas.  Vegetables  Fresh or frozen vegetables (raw, steamed, roasted, or grilled). Low-sodium or reduced-sodium tomato and vegetable juice. Low-sodium or reduced-sodium tomato sauce and tomato paste. Low-sodium or reduced-sodium canned vegetables.  Fruits  All fresh, dried, or frozen fruit. Canned fruit in natural juice (without  added sugar).  Meat and other protein foods  Skinless chicken or turkey. Ground chicken or turkey. Pork with fat trimmed off. Fish and seafood. Egg whites. Dried beans, peas, or lentils. Unsalted nuts, nut butters, and seeds. Unsalted canned beans. Lean cuts of beef with fat trimmed off. Low-sodium, lean deli meat.  Dairy  Low-fat (1%) or fat-free (skim) milk. Fat-free, low-fat, or reduced-fat cheeses. Nonfat, low-sodium ricotta or cottage cheese. Low-fat or nonfat yogurt. Low-fat, low-sodium cheese.  Fats and oils  Soft margarine without trans fats. Vegetable oil. Low-fat, reduced-fat, or light mayonnaise and salad dressings (reduced-sodium). Canola, safflower, olive, soybean, and sunflower oils. Avocado.  Seasoning and other foods  Herbs. Spices. Seasoning mixes without salt. Unsalted popcorn and pretzels. Fat-free sweets.  What foods are not recommended?  The items listed may not be a complete list. Talk with your dietitian about what dietary choices are best for you.  Grains  Baked goods made with fat, such as croissants, muffins, or some breads. Dry pasta or rice meal packs.  Vegetables  Creamed or fried vegetables. Vegetables in a cheese sauce. Regular canned vegetables (not low-sodium or reduced-sodium). Regular canned tomato sauce and paste (not low-sodium or reduced-sodium). Regular tomato and vegetable juice (not low-sodium or reduced-sodium). Pickles. Olives.  Fruits  Canned fruit in a light or heavy syrup. Fried fruit. Fruit in cream or butter sauce.  Meat and other protein foods  Fatty cuts of meat. Ribs. Fried meat. Ambriz. Sausage. Bologna and other processed lunch meats. Salami. Fatback. Hotdogs. Bratwurst. Salted nuts and seeds. Canned beans with added salt. Canned or smoked fish. Whole eggs or egg yolks. Chicken or turkey with skin.  Dairy  Whole or 2% milk, cream, and half-and-half. Whole or full-fat cream cheese. Whole-fat or sweetened yogurt. Full-fat cheese. Nondairy creamers. Whipped toppings.  Processed cheese and cheese spreads.  Fats and oils  Butter. Stick margarine. Lard. Shortening. Ghee. Ambriz fat. Tropical oils, such as coconut, palm kernel, or palm oil.  Seasoning and other foods  Salted popcorn and pretzels. Onion salt, garlic salt, seasoned salt, table salt, and sea salt. Worcestershire sauce. Tartar sauce. Barbecue sauce. Teriyaki sauce. Soy sauce, including reduced-sodium. Steak sauce. Canned and packaged gravies. Fish sauce. Oyster sauce. Cocktail sauce. Horseradish that you find on the shelf. Ketchup. Mustard. Meat flavorings and tenderizers. Bouillon cubes. Hot sauce and Tabasco sauce. Premade or packaged marinades. Premade or packaged taco seasonings. Relishes. Regular salad dressings.  Where to find more information:  · National Heart, Lung, and Blood [place]: www.nhlbi.nih.gov  · American Heart Association: www.heart.org  Summary  · The DASH eating plan is a healthy eating plan that has been shown to reduce high blood pressure (hypertension). It may also reduce your risk for type 2 diabetes, heart disease, and stroke.  · With the DASH eating plan, you should limit salt (sodium) intake to 2,300 mg a day. If you have hypertension, you may need to reduce your sodium intake to 1,500 mg a day.  · When on the DASH eating plan, aim to eat more fresh fruits and vegetables, whole grains, lean proteins, low-fat dairy, and heart-healthy fats.  · Work with your health care provider or diet and nutrition specialist (dietitian) to adjust your eating plan to your individual calorie needs.  This information is not intended to replace advice given to you by your health care provider. Make sure you discuss any questions you have with your health care provider.  Document Released: 12/06/2012 Document Revised: 12/11/2017 Document Reviewed: 12/11/2017  [employer] Interactive Patient Education © 2020 [employer]      Smoking Tobacco Information, Adult  Smoking tobacco can be harmful to your health. Tobacco  contains a poisonous (toxic), colorless chemical called nicotine. Nicotine is addictive. It changes the brain and can make it hard to stop smoking. Tobacco also has other toxic chemicals that can hurt your body and raise your risk of many cancers.  How can smoking tobacco affect me?  Smoking tobacco puts you at risk for:  · Cancer. Smoking is most commonly associated with lung cancer, but can also lead to cancer in other parts of the body.  · Chronic obstructive pulmonary disease (COPD). This is a long-term lung condition that makes it hard to breathe. It also gets worse over time.  · High blood pressure (hypertension), heart disease, stroke, or heart attack.  · Lung infections, such as pneumonia.  · Cataracts. This is when the lenses in the eyes become clouded.  · Digestive problems. This may include peptic ulcers, heartburn, and gastroesophageal reflux disease (GERD).  · Oral health problems, such as gum disease and tooth loss.  · Loss of taste and smell.  Smoking can affect your appearance by causing:  · Wrinkles.  · Yellow or stained teeth, fingers, and fingernails.  Smoking tobacco can also affect your social life, because:  · It may be challenging to find places to smoke when away from home. Many workplaces, restaurants, hotels, and public places are tobacco-free.  · Smoking is expensive. This is due to the cost of tobacco and the long-term costs of treating health problems from smoking.  · Secondhand smoke may affect those around you. Secondhand smoke can cause lung cancer, breathing problems, and heart disease. Children of smokers have a higher risk for:  ? Sudden infant death syndrome (SIDS).  ? Ear infections.  ? Lung infections.  If you currently smoke tobacco, quitting now can help you:  · Lead a longer and healthier life.  · Look, smell, breathe, and feel better over time.  · Save money.  · Protect others from the harms of secondhand smoke.  What actions can I take to prevent health problems?  Quit  smoking    · Do not start smoking. Quit if you already do.  · Make a plan to quit smoking and commit to it. Look for programs to help you and ask your health care provider for recommendations and ideas.  · Set a date and write down all the reasons you want to quit.  · Let your friends and family know you are quitting so they can help and support you. Consider finding friends who also want to quit. It can be easier to quit with someone else, so that you can support each other.  · Talk with your health care provider about using nicotine replacement medicines to help you quit, such as gum, lozenges, patches, sprays, or pills.  · Do not replace cigarette smoking with electronic cigarettes, which are commonly called e-cigarettes. The safety of e-cigarettes is not known, and some may contain harmful chemicals.  · If you try to quit but return to smoking, stay positive. It is common to slip up when you first quit, so take it one day at a time.  · Be prepared for cravings. When you feel the urge to smoke, chew gum or suck on hard candy.  Lifestyle  · Stay busy and take care of your body.  · Drink enough fluid to keep your urine pale yellow.  · Get plenty of exercise and eat a healthy diet. This can help prevent weight gain after quitting.  · Monitor your eating habits. Quitting smoking can cause you to have a larger appetite than when you smoke.  · Find ways to relax. Go out with friends or family to a movie or a restaurant where people do not smoke.  · Ask your health care provider about having regular tests (screenings) to check for cancer. This may include blood tests, imaging tests, and other tests.  · Find ways to manage your stress, such as meditation, yoga, or exercise.  Where to find support  To get support to quit smoking, consider:  · Asking your health care provider for more information and resources.  · Taking classes to learn more about quitting smoking.  · Looking for local organizations that offer resources  about quitting smoking.  · Joining a support group for people who want to quit smoking in your local community.  · Calling the smokefree.gov counselor helpline: 1-800-Quit-Now (1-682.147.8367)  Where to find more information  You may find more information about quitting smoking from:  · HelpGuide.org: www.helpguide.org  · Smokefree.gov: smokefree.gov  · American Lung Association: www.lung.org  Contact a health care provider if you:  · Have problems breathing.  · Notice that your lips, nose, or fingers turn blue.  · Have chest pain.  · Are coughing up blood.  · Feel faint or you pass out.  · Have other health changes that cause you to worry.  Summary  · Smoking tobacco can negatively affect your health, the health of those around you, your finances, and your social life.  · Do not start smoking. Quit if you already do. If you need help quitting, ask your health care provider.  · Think about joining a support group for people who want to quit smoking in your local community. There are many effective programs that will help you to quit this behavior.  This information is not intended to replace advice given to you by your health care provider. Make sure you discuss any questions you have with your health care provider.  Document Released: 01/02/2018 Document Revised: 02/06/2019 Document Reviewed: 01/02/2018  [employer] Interactive Patient Education © 2020 [employer]

## 2020-03-11 NOTE — PATIENT INSTRUCTIONS

## 2020-04-08 ENCOUNTER — TELEMEDICINE (OUTPATIENT)
Dept: FAMILY MEDICINE CLINIC | Facility: CLINIC | Age: 50
End: 2020-04-08

## 2020-04-08 DIAGNOSIS — G89.29 CHRONIC BILATERAL LOW BACK PAIN WITH SCIATICA, SCIATICA LATERALITY UNSPECIFIED: ICD-10-CM

## 2020-04-08 DIAGNOSIS — F32.A DEPRESSION, UNSPECIFIED DEPRESSION TYPE: ICD-10-CM

## 2020-04-08 DIAGNOSIS — E11.8 TYPE 2 DIABETES MELLITUS WITH COMPLICATION, WITH LONG-TERM CURRENT USE OF INSULIN (HCC): ICD-10-CM

## 2020-04-08 DIAGNOSIS — M54.50 LUMBAR PAIN: ICD-10-CM

## 2020-04-08 DIAGNOSIS — I10 ESSENTIAL HYPERTENSION: ICD-10-CM

## 2020-04-08 DIAGNOSIS — E11.42 DIABETIC POLYNEUROPATHY ASSOCIATED WITH TYPE 2 DIABETES MELLITUS (HCC): ICD-10-CM

## 2020-04-08 DIAGNOSIS — M54.32 SCIATICA OF LEFT SIDE: ICD-10-CM

## 2020-04-08 DIAGNOSIS — M51.36 DDD (DEGENERATIVE DISC DISEASE), LUMBAR: ICD-10-CM

## 2020-04-08 DIAGNOSIS — Z79.4 TYPE 2 DIABETES MELLITUS WITH COMPLICATION, WITH LONG-TERM CURRENT USE OF INSULIN (HCC): ICD-10-CM

## 2020-04-08 DIAGNOSIS — F17.210 CIGARETTE SMOKER: ICD-10-CM

## 2020-04-08 DIAGNOSIS — M54.40 CHRONIC BILATERAL LOW BACK PAIN WITH SCIATICA, SCIATICA LATERALITY UNSPECIFIED: ICD-10-CM

## 2020-04-08 DIAGNOSIS — E11.65 UNCONTROLLED TYPE 2 DIABETES MELLITUS WITH HYPERGLYCEMIA (HCC): Primary | ICD-10-CM

## 2020-04-08 DIAGNOSIS — Z72.0 TOBACCO USER: ICD-10-CM

## 2020-04-08 PROCEDURE — 99213 OFFICE O/P EST LOW 20 MIN: CPT | Performed by: FAMILY MEDICINE

## 2020-04-08 NOTE — PROGRESS NOTES
Subjective    Mine Benedict is a 50 y.o. Obese white male with uncontrolled DM II, HTN, Diabetic neuropathy, Depression, Chronic Low back pain H/O Lumbar Laminectomy, and other health problems, see PMH/PSH. Pt calls with video to discuss chronic health problems, Tx and F/U plan.    ' Stress with Mother's recent stroke. Following precautions for Covid -19. Able to get meds, taking Insulin routinely. Nasal septum the same. Saw Neurosurgery, recommended new MRI, would have to suffer thru PT again, can't tolerate the pain, will hold off for now'.    Diabetes   He has type 2 diabetes mellitus. No MedicAlert identification noted. The initial diagnosis of diabetes was made 27 years ago. Hypoglycemia symptoms include sleepiness and sweats. Pertinent negatives for hypoglycemia include no confusion, dizziness, hunger, mood changes, nervousness/anxiousness, pallor, seizures, speech difficulty or tremors. Associated symptoms include foot paresthesias, polydipsia, polyuria and weight loss. Pertinent negatives for diabetes include no blurred vision, no foot ulcerations and no polyphagia. Hypoglycemia complications include nocturnal hypoglycemia. Pertinent negatives for hypoglycemia complications include no blackouts, no hospitalization, no required assistance and no required glucagon injection. Symptoms are stable. Diabetic complications include impotence, peripheral neuropathy and retinopathy. Pertinent negatives for diabetic complications include no CVA, heart disease, nephropathy or PVD. Risk factors for coronary artery disease include hypertension, obesity, sedentary lifestyle, stress and tobacco exposure. Current diabetic treatment includes diet, insulin injections and oral agent (dual therapy). He is compliant with treatment all of the time. He is currently taking insulin pre-breakfast, pre-dinner and at bedtime. Insulin injections are given by patient. Rotation sites for injection include the abdominal wall and thighs.  His weight is decreasing steadily. He is following a generally healthy diet. Meal planning includes carbohydrate counting. He has not had a previous visit with a dietitian. He participates in exercise weekly. He monitors blood glucose at home 1-2 x per day. He monitors urine at home <1 x per month. Blood glucose monitoring compliance is fair. His home blood glucose trend is fluctuating minimally. His breakfast blood glucose is taken between 8-9 am. His breakfast blood glucose range is generally  mg/dl. His lunch blood glucose is taken between 12-1 pm. His lunch blood glucose range is generally 130-140 mg/dl. His dinner blood glucose is taken between 7-8 pm. His dinner blood glucose range is generally 130-140 mg/dl. His overall blood glucose range is 180-200 mg/dl. He does not see a podiatrist.Eye exam is not current.   Hypertension   This is a chronic problem. The current episode started more than 1 year ago. The problem is controlled. Associated symptoms include sweats. Pertinent negatives include no blurred vision, palpitations or shortness of breath. Agents associated with hypertension include NSAIDs. Risk factors for coronary artery disease include smoking/tobacco exposure, stress, obesity, male gender and diabetes mellitus. Past treatments include ACE inhibitors. Current antihypertension treatment includes ACE inhibitors. The current treatment provides significant improvement. Hypertensive end-organ damage includes retinopathy. There is no history of CVA or PVD.   Insomnia   This is a recurrent problem. The current episode started more than 1 month ago. The problem occurs daily. The problem has been waxing and waning. Associated symptoms comments: Insomnia, mind racing. . The symptoms are aggravated by stress and smoking. He has tried relaxation (Doxepin) for the symptoms. The treatment provided mild relief.   Depression   Visit Type: follow-up  Patient presents with the following symptoms: decreased  concentration, depressed mood, impotence, insomnia and weight loss.  Patient is not experiencing: confusion, nervousness/anxiety, palpitations, shortness of breath and suicidal ideas.  Frequency of symptoms: occasionally   Severity: moderate   Sleep quality: fair  Compliance with medications:  51-75%        Neurologic Problem   The patient's pertinent negatives include no syncope. Primary symptoms comment: Diabetic Neuropathy. This is a chronic problem. The current episode started more than 1 year ago. The neurological problem developed gradually. The problem has been gradually worsening since onset. Associated symptoms include back pain. Pertinent negatives include no confusion, dizziness, light-headedness, palpitations or shortness of breath. Treatments tried: Neurontin, Cymbalta. The treatment provided moderate relief. There is no history of mood changes.   Back Pain   This is a recurrent problem. The current episode started more than 1 month ago. The problem occurs constantly. The problem has been gradually worsening since onset. The pain is present in the lumbar spine. The pain radiates to the left thigh. The pain is at a severity of 8/10. The pain is severe. The pain is worse during the night. The symptoms are aggravated by position. Associated symptoms include weight loss. Pertinent negatives include no dysuria. Risk factors: H/O lumbar fusion. He has tried analgesics, home exercises, heat, muscle relaxant and NSAIDs for the symptoms. The treatment provided mild relief.        The following portions of the patient's history were reviewed and updated as appropriate: allergies, current medications, past family history, past medical history, past social history, past surgical history and problem list.    Review of Systems   Constitutional: Positive for weight loss. Negative for activity change.   HENT: Negative for dental problem, ear pain and postnasal drip.    Eyes: Negative for blurred vision, pain, discharge  and visual disturbance.   Respiratory: Negative for apnea, shortness of breath and wheezing.    Cardiovascular: Negative for palpitations and leg swelling.   Gastrointestinal: Negative for blood in stool and constipation.   Endocrine: Positive for polydipsia and polyuria. Negative for cold intolerance, heat intolerance and polyphagia.   Genitourinary: Positive for impotence. Negative for discharge and dysuria.   Musculoskeletal: Positive for back pain.        Sciatica, L side , Low back pain , limit activity.       Skin: Negative for color change, pallor and wound.   Allergic/Immunologic: Negative for environmental allergies and food allergies.   Neurological: Negative for dizziness, tremors, seizures, syncope, facial asymmetry, speech difficulty and light-headedness.        Diabetic Neuropathy   Hematological: Negative.    Psychiatric/Behavioral: Positive for decreased concentration, dysphoric mood and sleep disturbance. Negative for agitation, behavioral problems, confusion, hallucinations, self-injury and suicidal ideas. The patient has insomnia. The patient is not nervous/anxious.        Objective   Physical Exam   Constitutional: He is oriented to person, place, and time. No distress.   Eyes: Pupils are equal, round, and reactive to light.   Pulmonary/Chest: Effort normal.   Neurological: He is alert and oriented to person, place, and time.   Psychiatric: He has a normal mood and affect. His behavior is normal. Judgment and thought content normal.       Assessment/Plan   Diagnoses and all orders for this visit:    Uncontrolled type 2 diabetes mellitus with hyperglycemia (CMS/[facility])  -     Hemoglobin A1c; Future    Essential hypertension    Type 2 diabetes mellitus with complication, with long-term current use of insulin (CMS/[facility])    Chronic bilateral low back pain with sciatica, sciatica laterality unspecified    Diabetic polyneuropathy associated with type 2 diabetes mellitus (CMS/[facility])    Sciatica of left  side    Lumbar pain    DDD (degenerative disc disease), lumbar    Tobacco user    Depression, unspecified depression type    Cigarette smoker      Discussed Health problems, meds, indications, Tx plan, rationale. Discussed F/U with specialist. Discussed Precautions, against Covid-19. Discussed f/u here.        This document has been electronically signed by Chris Spencer MD on April 8, 2020 13:08

## 2020-05-07 ENCOUNTER — OFFICE VISIT (OUTPATIENT)
Dept: FAMILY MEDICINE CLINIC | Facility: CLINIC | Age: 50
End: 2020-05-07

## 2020-05-07 ENCOUNTER — LAB (OUTPATIENT)
Dept: LAB | Facility: HOSPITAL | Age: 50
End: 2020-05-07

## 2020-05-07 VITALS
BODY MASS INDEX: 31.68 KG/M2 | TEMPERATURE: 98.1 F | DIASTOLIC BLOOD PRESSURE: 72 MMHG | HEIGHT: 73 IN | WEIGHT: 239 LBS | HEART RATE: 77 BPM | OXYGEN SATURATION: 98 % | SYSTOLIC BLOOD PRESSURE: 118 MMHG

## 2020-05-07 DIAGNOSIS — E66.9 NON MORBID OBESITY: ICD-10-CM

## 2020-05-07 DIAGNOSIS — F32.A DEPRESSION, UNSPECIFIED DEPRESSION TYPE: ICD-10-CM

## 2020-05-07 DIAGNOSIS — Z79.4 TYPE 2 DIABETES MELLITUS WITH COMPLICATION, WITH LONG-TERM CURRENT USE OF INSULIN (HCC): ICD-10-CM

## 2020-05-07 DIAGNOSIS — E11.8 TYPE 2 DIABETES MELLITUS WITH COMPLICATION, WITH LONG-TERM CURRENT USE OF INSULIN (HCC): ICD-10-CM

## 2020-05-07 DIAGNOSIS — E11.65 UNCONTROLLED TYPE 2 DIABETES MELLITUS WITH HYPERGLYCEMIA (HCC): ICD-10-CM

## 2020-05-07 DIAGNOSIS — Z72.0 TOBACCO USER: ICD-10-CM

## 2020-05-07 DIAGNOSIS — G62.9 NEUROPATHY: ICD-10-CM

## 2020-05-07 LAB
ALBUMIN UR-MCNC: <1.2 MG/DL
BACTERIA UR QL AUTO: NORMAL /HPF
BILIRUB UR QL STRIP: NEGATIVE
CLARITY UR: CLEAR
COLOR UR: YELLOW
GLUCOSE UR STRIP-MCNC: ABNORMAL MG/DL
HGB UR QL STRIP.AUTO: NEGATIVE
HYALINE CASTS UR QL AUTO: NORMAL /LPF
KETONES UR QL STRIP: NEGATIVE
LEUKOCYTE ESTERASE UR QL STRIP.AUTO: ABNORMAL
NITRITE UR QL STRIP: NEGATIVE
PH UR STRIP.AUTO: 5.5 [PH] (ref 5–8)
PROT UR QL STRIP: NEGATIVE
RBC # UR: NORMAL /HPF
REF LAB TEST METHOD: NORMAL
SP GR UR STRIP: 1.02 (ref 1–1.03)
SQUAMOUS #/AREA URNS HPF: NORMAL /HPF
UROBILINOGEN UR QL STRIP: ABNORMAL
WBC UR QL AUTO: NORMAL /HPF

## 2020-05-07 PROCEDURE — 85025 COMPLETE CBC W/AUTO DIFF WBC: CPT

## 2020-05-07 PROCEDURE — 84681 ASSAY OF C-PEPTIDE: CPT

## 2020-05-07 PROCEDURE — 99214 OFFICE O/P EST MOD 30 MIN: CPT | Performed by: FAMILY MEDICINE

## 2020-05-07 PROCEDURE — 82043 UR ALBUMIN QUANTITATIVE: CPT

## 2020-05-07 PROCEDURE — 83036 HEMOGLOBIN GLYCOSYLATED A1C: CPT

## 2020-05-07 PROCEDURE — 81001 URINALYSIS AUTO W/SCOPE: CPT

## 2020-05-07 PROCEDURE — 84443 ASSAY THYROID STIM HORMONE: CPT

## 2020-05-07 PROCEDURE — 80053 COMPREHEN METABOLIC PANEL: CPT

## 2020-05-07 PROCEDURE — 80061 LIPID PANEL: CPT

## 2020-05-07 RX ORDER — GABAPENTIN 800 MG/1
800 TABLET ORAL 4 TIMES DAILY
Qty: 120 TABLET | Refills: 3 | Status: SHIPPED | OUTPATIENT
Start: 2020-05-07 | End: 2020-10-14 | Stop reason: SDUPTHER

## 2020-05-07 RX ORDER — NAPROXEN 500 MG/1
500 TABLET ORAL 2 TIMES DAILY WITH MEALS
Qty: 60 TABLET | Refills: 1 | Status: SHIPPED | OUTPATIENT
Start: 2020-05-07 | End: 2020-08-03

## 2020-05-07 NOTE — PROGRESS NOTES
Subjective   Mine Benedict is a 50 y.o. Obese white male with uncontrolled DM II, HTN, Diabetic neuropathy, Depression, Chronic Low back pain H/O Lumbar Laminectomy, and other health problems, see PMH/PSH. Pt presents for exam, to discuss acute and chronic health problems, Tx and F/U plan.    ' Stress up, Mother S/P CVA, will be coming home after rehab with significant incapacity. Back has been a little better, but pain continues to limit activity. Has  MRI approved, will have F/U with Neurosurgery. Blood sugars have improved some. B/p has been good at checks'    Diabetes   He has type 2 diabetes mellitus. No MedicAlert identification noted. The initial diagnosis of diabetes was made 27 years ago. Hypoglycemia symptoms include sleepiness and sweats. Pertinent negatives for hypoglycemia include no confusion, dizziness, hunger, mood changes, nervousness/anxiousness, pallor, seizures, speech difficulty or tremors. Associated symptoms include foot paresthesias, polydipsia, polyuria and weight loss. Pertinent negatives for diabetes include no blurred vision, no chest pain, no foot ulcerations and no polyphagia. Hypoglycemia complications include nocturnal hypoglycemia. Pertinent negatives for hypoglycemia complications include no blackouts, no hospitalization, no required assistance and no required glucagon injection. Symptoms are stable. Diabetic complications include impotence, peripheral neuropathy and retinopathy. Pertinent negatives for diabetic complications include no CVA, heart disease, nephropathy or PVD. Risk factors for coronary artery disease include hypertension, obesity, sedentary lifestyle, stress and tobacco exposure. Current diabetic treatment includes diet, insulin injections and oral agent (dual therapy). He is compliant with treatment all of the time. He is currently taking insulin pre-breakfast, pre-dinner and at bedtime. Insulin injections are given by patient. Rotation sites for injection include  the abdominal wall and thighs. His weight is decreasing steadily. He is following a generally healthy diet. Meal planning includes carbohydrate counting. He has not had a previous visit with a dietitian. He participates in exercise weekly. He monitors blood glucose at home 1-2 x per day. He monitors urine at home <1 x per month. Blood glucose monitoring compliance is fair. His home blood glucose trend is fluctuating minimally. His breakfast blood glucose is taken between 8-9 am. His breakfast blood glucose range is generally  mg/dl. His lunch blood glucose is taken between 12-1 pm. His lunch blood glucose range is generally 130-140 mg/dl. His dinner blood glucose is taken between 7-8 pm. His dinner blood glucose range is generally 130-140 mg/dl. His overall blood glucose range is 140-180 mg/dl. He does not see a podiatrist.Eye exam is not current.   Hypertension   This is a chronic problem. The current episode started more than 1 year ago. The problem is controlled. Associated symptoms include sweats. Pertinent negatives include no blurred vision, chest pain, palpitations or shortness of breath. Agents associated with hypertension include NSAIDs. Risk factors for coronary artery disease include smoking/tobacco exposure, stress, obesity, male gender and diabetes mellitus. Past treatments include ACE inhibitors. Current antihypertension treatment includes ACE inhibitors. The current treatment provides significant improvement. Hypertensive end-organ damage includes retinopathy. There is no history of CVA or PVD.   Insomnia   This is a recurrent problem. The current episode started more than 1 month ago. The problem occurs daily. The problem has been waxing and waning. Pertinent negatives include no chest pain, chills or fever. Associated symptoms comments: Insomnia, mind racing. . The symptoms are aggravated by stress and smoking. He has tried relaxation (Doxepin) for the symptoms. The treatment provided mild  relief.   Depression   Visit Type: follow-up  Patient presents with the following symptoms: decreased concentration, depressed mood, impotence, insomnia and weight loss.  Patient is not experiencing: confusion, nervousness/anxiety, palpitations, shortness of breath and suicidal ideas.  Frequency of symptoms: occasionally   Severity: moderate   Sleep quality: fair  Compliance with medications:  51-75%        Neurologic Problem   The patient's pertinent negatives include no syncope. Primary symptoms comment: Diabetic Neuropathy. This is a chronic problem. The current episode started more than 1 year ago. The neurological problem developed gradually. The problem has been gradually worsening since onset. Associated symptoms include back pain. Pertinent negatives include no chest pain, confusion, dizziness, fever, light-headedness, palpitations or shortness of breath. Treatments tried: Neurontin, Cymbalta. The treatment provided moderate relief. There is no history of mood changes.   Back Pain   This is a recurrent problem. The current episode started more than 1 month ago. The problem occurs constantly. The problem has been gradually worsening since onset. The pain is present in the lumbar spine. The pain radiates to the left thigh. The pain is at a severity of 6/10. The pain is severe. The pain is worse during the night. The symptoms are aggravated by position. Associated symptoms include weight loss. Pertinent negatives include no chest pain, dysuria or fever. Risk factors: H/O lumbar fusion. He has tried analgesics, home exercises, heat, muscle relaxant and NSAIDs for the symptoms. The treatment provided mild relief.        The following portions of the patient's history were reviewed and updated as appropriate: allergies, current medications, past family history, past medical history, past social history, past surgical history and problem list.    Review of Systems   Constitutional: Positive for unexpected weight  loss. Negative for chills and fever.   HENT: Negative.    Eyes: Negative for blurred vision and visual disturbance.   Respiratory: Negative for shortness of breath.    Cardiovascular: Negative for chest pain, palpitations and leg swelling.   Gastrointestinal: Negative.    Endocrine: Positive for polydipsia and polyuria. Negative for polyphagia.   Genitourinary: Positive for impotence. Negative for dysuria.   Musculoskeletal: Positive for back pain.   Skin: Negative for pallor.   Allergic/Immunologic: Negative.    Neurological: Negative for dizziness, tremors, seizures, syncope, speech difficulty, light-headedness and confusion.   Psychiatric/Behavioral: Positive for decreased concentration and depressed mood. Negative for suicidal ideas. The patient has insomnia. The patient is not nervous/anxious.        Objective   Physical Exam   Constitutional: He is oriented to person, place, and time. He appears well-developed and well-nourished.   HENT:   Head: Normocephalic.   Eyes: Pupils are equal, round, and reactive to light. Conjunctivae and EOM are normal.   Neck: Normal range of motion.   Cardiovascular: Normal rate, regular rhythm and normal heart sounds.   Pulmonary/Chest: Effort normal and breath sounds normal.   Abdominal: Soft. Bowel sounds are normal.   Musculoskeletal: Normal range of motion.   Neurological: He is alert and oriented to person, place, and time. No cranial nerve deficit.   Skin: Skin is warm and dry.   Psychiatric: He has a normal mood and affect. His behavior is normal. Judgment and thought content normal.         Assessment/Plan   Mine was seen today for diabetes and back pain.    Diagnoses and all orders for this visit:    Type 2 diabetes mellitus with complication, with long-term current use of insulin (CMS/LTAC, located within [facility])  -     CBC & Differential; Future  -     Comprehensive metabolic panel; Future  -     TSH; Future  -     Hemoglobin A1c; Future  -     Lipid Panel; Future  -     MicroAlbumin,  Urine, Random - Urine, Clean Catch; Future  -     C-Peptide; Future  -     Urinalysis With Culture If Indicated -; Future  -     XR Chest AP (In Office); Future    Neuropathy  -     gabapentin (NEURONTIN) 800 MG tablet; Take 1 tablet by mouth 4 (Four) Times a Day.    Tobacco user    Non morbid obesity    Depression, unspecified depression type    Other orders  -     naproxen (Naprosyn) 500 MG tablet; Take 1 tablet by mouth 2 (Two) Times a Day With Meals.      Discussed exam, health problems, meds, indications, tx plan, rationale. Discussed BMI, BEE, diet exercise. Discussed Smoking cessation x 3 mins. Discussed f/U with specialist , discussed f/u here.  Patient's Body mass index is 31.53 kg/m². BMI is above normal parameters. Recommendations include: educational material, nutrition counseling and referral to primary care.        This document has been electronically signed by Chris Spencer MD

## 2020-05-08 LAB
ALBUMIN SERPL-MCNC: 4.3 G/DL (ref 3.5–5.2)
ALBUMIN/GLOB SERPL: 1.5 G/DL
ALP SERPL-CCNC: 97 U/L (ref 39–117)
ALT SERPL W P-5'-P-CCNC: 12 U/L (ref 1–41)
ANION GAP SERPL CALCULATED.3IONS-SCNC: 14.2 MMOL/L (ref 5–15)
AST SERPL-CCNC: 16 U/L (ref 1–40)
BASOPHILS # BLD AUTO: 0.06 10*3/MM3 (ref 0–0.2)
BASOPHILS NFR BLD AUTO: 0.8 % (ref 0–1.5)
BILIRUB SERPL-MCNC: 0.3 MG/DL (ref 0.2–1.2)
BUN BLD-MCNC: 10 MG/DL (ref 6–20)
BUN/CREAT SERPL: 11.8 (ref 7–25)
CALCIUM SPEC-SCNC: 9.2 MG/DL (ref 8.6–10.5)
CHLORIDE SERPL-SCNC: 102 MMOL/L (ref 98–107)
CHOLEST SERPL-MCNC: 119 MG/DL (ref 0–200)
CO2 SERPL-SCNC: 23.8 MMOL/L (ref 22–29)
CREAT BLD-MCNC: 0.85 MG/DL (ref 0.76–1.27)
DEPRECATED RDW RBC AUTO: 44.9 FL (ref 37–54)
EOSINOPHIL # BLD AUTO: 0.36 10*3/MM3 (ref 0–0.4)
EOSINOPHIL NFR BLD AUTO: 4.8 % (ref 0.3–6.2)
ERYTHROCYTE [DISTWIDTH] IN BLOOD BY AUTOMATED COUNT: 13.3 % (ref 12.3–15.4)
GFR SERPL CREATININE-BSD FRML MDRD: 96 ML/MIN/1.73
GLOBULIN UR ELPH-MCNC: 2.9 GM/DL
GLUCOSE BLD-MCNC: 194 MG/DL (ref 65–99)
HBA1C MFR BLD: 8.15 % (ref 4.8–5.6)
HCT VFR BLD AUTO: 49.1 % (ref 37.5–51)
HDLC SERPL-MCNC: 39 MG/DL (ref 40–60)
HGB BLD-MCNC: 17.1 G/DL (ref 13–17.7)
IMM GRANULOCYTES # BLD AUTO: 0.11 10*3/MM3 (ref 0–0.05)
IMM GRANULOCYTES NFR BLD AUTO: 1.5 % (ref 0–0.5)
LDLC SERPL CALC-MCNC: 58 MG/DL (ref 0–100)
LDLC/HDLC SERPL: 1.48 {RATIO}
LYMPHOCYTES # BLD AUTO: 2.35 10*3/MM3 (ref 0.7–3.1)
LYMPHOCYTES NFR BLD AUTO: 31.2 % (ref 19.6–45.3)
MCH RBC QN AUTO: 32.8 PG (ref 26.6–33)
MCHC RBC AUTO-ENTMCNC: 34.8 G/DL (ref 31.5–35.7)
MCV RBC AUTO: 94.1 FL (ref 79–97)
MONOCYTES # BLD AUTO: 0.65 10*3/MM3 (ref 0.1–0.9)
MONOCYTES NFR BLD AUTO: 8.6 % (ref 5–12)
NEUTROPHILS # BLD AUTO: 4 10*3/MM3 (ref 1.7–7)
NEUTROPHILS NFR BLD AUTO: 53.1 % (ref 42.7–76)
NRBC BLD AUTO-RTO: 0 /100 WBC (ref 0–0.2)
PLATELET # BLD AUTO: 183 10*3/MM3 (ref 140–450)
PMV BLD AUTO: 11.9 FL (ref 6–12)
POTASSIUM BLD-SCNC: 5.3 MMOL/L (ref 3.5–5.2)
PROT SERPL-MCNC: 7.2 G/DL (ref 6–8.5)
RBC # BLD AUTO: 5.22 10*6/MM3 (ref 4.14–5.8)
SODIUM BLD-SCNC: 140 MMOL/L (ref 136–145)
TRIGL SERPL-MCNC: 112 MG/DL (ref 0–150)
TSH SERPL DL<=0.05 MIU/L-ACNC: 2.41 UIU/ML (ref 0.27–4.2)
VLDLC SERPL-MCNC: 22.4 MG/DL (ref 5–40)
WBC NRBC COR # BLD: 7.53 10*3/MM3 (ref 3.4–10.8)

## 2020-05-08 NOTE — PATIENT INSTRUCTIONS
Steps to Quit Smoking    Smoking tobacco can be bad for your health. It can also affect almost every organ in your body. Smoking puts you and people around you at risk for many serious long-lasting (chronic) diseases. Quitting smoking is hard, but it is one of the best things that you can do for your health. It is never too late to quit.  What are the benefits of quitting smoking?  When you quit smoking, you lower your risk for getting serious diseases and conditions. They can include:  · Lung cancer or lung disease.  · Heart disease.  · Stroke.  · Heart attack.  · Not being able to have children (infertility).  · Weak bones (osteoporosis) and broken bones (fractures).  If you have coughing, wheezing, and shortness of breath, those symptoms may get better when you quit. You may also get sick less often. If you are pregnant, quitting smoking can help to lower your chances of having a baby of low birth weight.  What can I do to help me quit smoking?  Talk with your doctor about what can help you quit smoking. Some things you can do (strategies) include:  · Quitting smoking totally, instead of slowly cutting back how much you smoke over a period of time.  · Going to in-person counseling. You are more likely to quit if you go to many counseling sessions.  · Using resources and support systems, such as:  ? Online chats with a counselor.  ? Phone quitlines.  ? Printed self-help materials.  ? Support groups or group counseling.  ? Text messaging programs.  ? Mobile phone apps or applications.  · Taking medicines. Some of these medicines may have nicotine in them. If you are pregnant or breastfeeding, do not take any medicines to quit smoking unless your doctor says it is okay. Talk with your doctor about counseling or other things that can help you.  Talk with your doctor about using more than one strategy at the same time, such as taking medicines while you are also going to in-person counseling. This can help make  quitting easier.  What things can I do to make it easier to quit?  Quitting smoking might feel very hard at first, but there is a lot that you can do to make it easier. Take these steps:  · Talk to your family and friends. Ask them to support and encourage you.  · Call phone quitlines, reach out to support groups, or work with a counselor.  · Ask people who smoke to not smoke around you.  · Avoid places that make you want (trigger) to smoke, such as:  ? Bars.  ? Parties.  ? Smoke-break areas at work.  · Spend time with people who do not smoke.  · Lower the stress in your life. Stress can make you want to smoke. Try these things to help your stress:  ? Getting regular exercise.  ? Deep-breathing exercises.  ? Yoga.  ? Meditating.  ? Doing a body scan. To do this, close your eyes, focus on one area of your body at a time from head to toe, and notice which parts of your body are tense. Try to relax the muscles in those areas.  · Download or buy apps on your mobile phone or tablet that can help you stick to your quit plan. There are many free apps, such as QuitGuide from the CDC (Centers for Disease Control and Prevention). You can find more support from smokefree.gov and other websites.  This information is not intended to replace advice given to you by your health care provider. Make sure you discuss any questions you have with your health care provider.  Document Released: 10/14/2010 Document Revised: 08/15/2017 Document Reviewed: 05/03/2016  [facility] Interactive Patient Education © 2020 [facility] [employer]      Exercising to Lose Weight  Exercise is structured, repetitive physical activity to improve fitness and health. Getting regular exercise is important for everyone. It is especially important if you are overweight. Being overweight increases your risk of heart disease, stroke, diabetes, high blood pressure, and several types of cancer. Reducing your calorie intake and exercising can help you lose weight.  Exercise is  usually categorized as moderate or vigorous intensity. To lose weight, most people need to do a certain amount of moderate-intensity or vigorous-intensity exercise each week.  Moderate-intensity exercise    Moderate-intensity exercise is any activity that gets you moving enough to burn at least three times more energy (calories) than if you were sitting.  Examples of moderate exercise include:  · Walking a mile in 15 minutes.  · Doing light yard work.  · Biking at an easy pace.  Most people should get at least 150 minutes (2 hours and 30 minutes) a week of moderate-intensity exercise to maintain their body weight.  Vigorous-intensity exercise  Vigorous-intensity exercise is any activity that gets you moving enough to burn at least six times more calories than if you were sitting. When you exercise at this intensity, you should be working hard enough that you are not able to carry on a conversation.  Examples of vigorous exercise include:  · Running.  · Playing a team sport, such as football, basketball, and soccer.  · Jumping rope.  Most people should get at least 75 minutes (1 hour and 15 minutes) a week of vigorous-intensity exercise to maintain their body weight.  How can exercise affect me?  When you exercise enough to burn more calories than you eat, you lose weight. Exercise also reduces body fat and builds muscle. The more muscle you have, the more calories you burn. Exercise also:  · Improves mood.  · Reduces stress and tension.  · Improves your overall fitness, flexibility, and endurance.  · Increases bone strength.  The amount of exercise you need to lose weight depends on:  · Your age.  · The type of exercise.  · Any health conditions you have.  · Your overall physical ability.  Talk to your health care provider about how much exercise you need and what types of activities are safe for you.  What actions can I take to lose weight?  Nutrition    · Make changes to your diet as told by your health care  provider or diet and nutrition specialist (dietitian). This may include:  ? Eating fewer calories.  ? Eating more protein.  ? Eating less unhealthy fats.  ? Eating a diet that includes fresh fruits and vegetables, whole grains, low-fat dairy products, and lean protein.  ? Avoiding foods with added fat, salt, and sugar.  · Drink plenty of water while you exercise to prevent dehydration or heat stroke.  Activity  · Choose an activity that you enjoy and set realistic goals. Your health care provider can help you make an exercise plan that works for you.  · Exercise at a moderate or vigorous intensity most days of the week.  ? The intensity of exercise may vary from person to person. You can tell how intense a workout is for you by paying attention to your breathing and heartbeat. Most people will notice their breathing and heartbeat get faster with more intense exercise.  · Do resistance training twice each week, such as:  ? Push-ups.  ? Sit-ups.  ? Lifting weights.  ? Using resistance bands.  · Getting short amounts of exercise can be just as helpful as long structured periods of exercise. If you have trouble finding time to exercise, try to include exercise in your daily routine.  ? Get up, stretch, and walk around every 30 minutes throughout the day.  ? Go for a walk during your lunch break.  ? Park your car farther away from your destination.  ? If you take public transportation, get off one stop early and walk the rest of the way.  ? Make phone calls while standing up and walking around.  ? Take the stairs instead of elevators or escalators.  · Wear comfortable clothes and shoes with good support.  · Do not exercise so much that you hurt yourself, feel dizzy, or get very short of breath.  Where to find more information  · [place] Department of Health and Human Services: www.hhs.gov  · Centers for Disease Control and Prevention (CDC): www.cdc.gov  Contact a health care provider:  · Before starting a new exercise  program.  · If you have questions or concerns about your weight.  · If you have a medical problem that keeps you from exercising.  Get help right away if you have any of the following while exercising:  · Injury.  · Dizziness.  · Difficulty breathing or shortness of breath that does not go away when you stop exercising.  · Chest pain.  · Rapid heartbeat.  Summary  · Being overweight increases your risk of heart disease, stroke, diabetes, high blood pressure, and several types of cancer.  · Losing weight happens when you burn more calories than you eat.  · Reducing the amount of calories you eat in addition to getting regular moderate or vigorous exercise each week helps you lose weight.  This information is not intended to replace advice given to you by your health care provider. Make sure you discuss any questions you have with your health care provider.  Document Released: 01/20/2012 Document Revised: 12/31/2018 Document Reviewed: 12/31/2018  [employer] Interactive Patient Education © 2020 [employer]      Calorie Counting for Weight Loss  Calories are units of energy. Your body needs a certain amount of calories from food to keep you going throughout the day. When you eat more calories than your body needs, your body stores the extra calories as fat. When you eat fewer calories than your body needs, your body burns fat to get the energy it needs.  Calorie counting means keeping track of how many calories you eat and drink each day. Calorie counting can be helpful if you need to lose weight. If you make sure to eat fewer calories than your body needs, you should lose weight. Ask your health care provider what a healthy weight is for you.  For calorie counting to work, you will need to eat the right number of calories in a day in order to lose a healthy amount of weight per week. A dietitian can help you determine how many calories you need in a day and will give you suggestions on how to reach your calorie  goal.  · A healthy amount of weight to lose per week is usually 1-2 lb (0.5-0.9 kg). This usually means that your daily calorie intake should be reduced by 500-750 calories.  · Eating 1,200 - 1,500 calories per day can help most women lose weight.  · Eating 1,500 - 1,800 calories per day can help most men lose weight.  What is my plan?  My goal is to have __________ calories per day.  If I have this many calories per day, I should lose around __________ pounds per week.  What do I need to know about calorie counting?  In order to meet your daily calorie goal, you will need to:  · Find out how many calories are in each food you would like to eat. Try to do this before you eat.  · Decide how much of the food you plan to eat.  · Write down what you ate and how many calories it had. Doing this is called keeping a food log.  To successfully lose weight, it is important to balance calorie counting with a healthy lifestyle that includes regular activity. Aim for 150 minutes of moderate exercise (such as walking) or 75 minutes of vigorous exercise (such as running) each week.  Where do I find calorie information?    The number of calories in a food can be found on a Nutrition Facts label. If a food does not have a Nutrition Facts label, try to look up the calories online or ask your dietitian for help.  Remember that calories are listed per serving. If you choose to have more than one serving of a food, you will have to multiply the calories per serving by the amount of servings you plan to eat. For example, the label on a package of bread might say that a serving size is 1 slice and that there are 90 calories in a serving. If you eat 1 slice, you will have eaten 90 calories. If you eat 2 slices, you will have eaten 180 calories.  How do I keep a food log?  Immediately after each meal, record the following information in your food log:  · What you ate. Don't forget to include toppings, sauces, and other extras on the  "food.  · How much you ate. This can be measured in cups, ounces, or number of items.  · How many calories each food and drink had.  · The total number of calories in the meal.  Keep your food log near you, such as in a small notebook in your pocket, or use a mobile arely or website. Some programs will calculate calories for you and show you how many calories you have left for the day to meet your goal.  What are some calorie counting tips?    · Use your calories on foods and drinks that will fill you up and not leave you hungry:  ? Some examples of foods that fill you up are nuts and nut butters, vegetables, lean proteins, and high-fiber foods like whole grains. High-fiber foods are foods with more than 5 g fiber per serving.  ? Drinks such as sodas, specialty coffee drinks, alcohol, and juices have a lot of calories, yet do not fill you up.  · Eat nutritious foods and avoid empty calories. Empty calories are calories you get from foods or beverages that do not have many vitamins or protein, such as candy, sweets, and soda. It is better to have a nutritious high-calorie food (such as an avocado) than a food with few nutrients (such as a bag of chips).  · Know how many calories are in the foods you eat most often. This will help you calculate calorie counts faster.  · Pay attention to calories in drinks. Low-calorie drinks include water and unsweetened drinks.  · Pay attention to nutrition labels for \"low fat\" or \"fat free\" foods. These foods sometimes have the same amount of calories or more calories than the full fat versions. They also often have added sugar, starch, or salt, to make up for flavor that was removed with the fat.  · Find a way of tracking calories that works for you. Get creative. Try different apps or programs if writing down calories does not work for you.  What are some portion control tips?  · Know how many calories are in a serving. This will help you know how many servings of a certain food you " can have.  · Use a measuring cup to measure serving sizes. You could also try weighing out portions on a kitchen scale. With time, you will be able to estimate serving sizes for some foods.  · Take some time to put servings of different foods on your favorite plates, bowls, and cups so you know what a serving looks like.  · Try not to eat straight from a bag or box. Doing this can lead to overeating. Put the amount you would like to eat in a cup or on a plate to make sure you are eating the right portion.  · Use smaller plates, glasses, and bowls to prevent overeating.  · Try not to multitask (for example, watch TV or use your computer) while eating. If it is time to eat, sit down at a table and enjoy your food. This will help you to know when you are full. It will also help you to be aware of what you are eating and how much you are eating.  What are tips for following this plan?  Reading food labels  · Check the calorie count compared to the serving size. The serving size may be smaller than what you are used to eating.  · Check the source of the calories. Make sure the food you are eating is high in vitamins and protein and low in saturated and trans fats.  Shopping  · Read nutrition labels while you shop. This will help you make healthy decisions before you decide to purchase your food.  · Make a grocery list and stick to it.  Cooking  · Try to cook your favorite foods in a healthier way. For example, try baking instead of frying.  · Use low-fat dairy products.  Meal planning  · Use more fruits and vegetables. Half of your plate should be fruits and vegetables.  · Include lean proteins like poultry and fish.  How do I count calories when eating out?  · Ask for smaller portion sizes.  · Consider sharing an entree and sides instead of getting your own entree.  · If you get your own entree, eat only half. Ask for a box at the beginning of your meal and put the rest of your entree in it so you are not tempted to eat  "it.  · If calories are listed on the menu, choose the lower calorie options.  · Choose dishes that include vegetables, fruits, whole grains, low-fat dairy products, and lean protein.  · Choose items that are boiled, broiled, grilled, or steamed. Stay away from items that are buttered, battered, fried, or served with cream sauce. Items labeled \"crispy\" are usually fried, unless stated otherwise.  · Choose water, low-fat milk, unsweetened iced tea, or other drinks without added sugar. If you want an alcoholic beverage, choose a lower calorie option such as a glass of wine or light beer.  · Ask for dressings, sauces, and syrups on the side. These are usually high in calories, so you should limit the amount you eat.  · If you want a salad, choose a garden salad and ask for grilled meats. Avoid extra toppings like rodriguez, cheese, or fried items. Ask for the dressing on the side, or ask for olive oil and vinegar or lemon to use as dressing.  · Estimate how many servings of a food you are given. For example, a serving of cooked rice is ½ cup or about the size of half a baseball. Knowing serving sizes will help you be aware of how much food you are eating at restaurants. The list below tells you how big or small some common portion sizes are based on everyday objects:  ? 1 oz--4 stacked dice.  ? 3 oz--1 deck of cards.  ? 1 tsp--1 die.  ? 1 Tbsp--½ a ping-pong ball.  ? 2 Tbsp--1 ping-pong ball.  ? ½ cup--½ baseball.  ? 1 cup--1 baseball.  Summary  · Calorie counting means keeping track of how many calories you eat and drink each day. If you eat fewer calories than your body needs, you should lose weight.  · A healthy amount of weight to lose per week is usually 1-2 lb (0.5-0.9 kg). This usually means reducing your daily calorie intake by 500-750 calories.  · The number of calories in a food can be found on a Nutrition Facts label. If a food does not have a Nutrition Facts label, try to look up the calories online or ask your " dietitian for help.  · Use your calories on foods and drinks that will fill you up, and not on foods and drinks that will leave you hungry.  · Use smaller plates, glasses, and bowls to prevent overeating.  This information is not intended to replace advice given to you by your health care provider. Make sure you discuss any questions you have with your health care provider.  Document Released: 12/18/2006 Document Revised: 09/06/2019 Document Reviewed: 11/17/2017  [employer] Interactive Patient Education © 2020 [employer]      Diabetes Mellitus and Standards of Medical Care  Managing diabetes (diabetes mellitus) can be complicated. Your diabetes treatment may be managed by a team of health care providers, including:  · A physician who specializes in diabetes (endocrinologist).  · A nurse practitioner or physician assistant.  · Nurses.  · A diet and nutrition specialist (registered dietitian).  · A certified diabetes educator (CDE).  · An exercise specialist.  · A pharmacist.  · An eye doctor.  · A foot specialist (podiatrist).  · A dentist.  · A primary care provider.  · A mental health provider.  Your health care providers follow guidelines to help you get the best quality of care. The following schedule is a general guideline for your diabetes management plan. Your health care providers may give you more specific instructions.  Physical exams  Upon being diagnosed with diabetes mellitus, and each year after that, your health care provider will ask about your medical and family history. He or she will also do a physical exam. Your exam may include:  · Measuring your height, weight, and body mass index (BMI).  · Checking your blood pressure. This will be done at every routine medical visit. Your target blood pressure may vary depending on your medical conditions, your age, and other factors.  · Thyroid gland exam.  · Skin exam.  · Screening for damage to your nerves (peripheral neuropathy). This may include checking  the pulse in your legs and feet and checking the level of sensation in your hands and feet.  · A complete foot exam to inspect the structure and skin of your feet, including checking for cuts, bruises, redness, blisters, sores, or other problems.  · Screening for blood vessel (vascular) problems, which may include checking the pulse in your legs and feet and checking your temperature.  Blood tests  Depending on your treatment plan and your personal needs, you may have the following tests done:  · HbA1c (hemoglobin A1c). This test provides information about blood sugar (glucose) control over the previous 2-3 months. It is used to adjust your treatment plan, if needed. This test will be done:  ? At least 2 times a year, if you are meeting your treatment goals.  ? 4 times a year, if you are not meeting your treatment goals or if treatment goals have changed.  · Lipid testing, including total, LDL, and HDL cholesterol and triglyceride levels.  ? The goal for LDL is less than 100 mg/dL (5.5 mmol/L). If you are at high risk for complications, the goal is less than 70 mg/dL (3.9 mmol/L).  ? The goal for HDL is 40 mg/dL (2.2 mmol/L) or higher for men and 50 mg/dL (2.8 mmol/L) or higher for women. An HDL cholesterol of 60 mg/dL (3.3 mmol/L) or higher gives some protection against heart disease.  ? The goal for triglycerides is less than 150 mg/dL (8.3 mmol/L).  · Liver function tests.  · Kidney function tests.  · Thyroid function tests.  Dental and eye exams  · Visit your dentist two times a year.  · If you have type 1 diabetes, your health care provider may recommend an eye exam 3-5 years after you are diagnosed, and then once a year after your first exam.  ? For children with type 1 diabetes, a health care provider may recommend an eye exam when your child is age 10 or older and has had diabetes for 3-5 years. After the first exam, your child should get an eye exam once a year.  · If you have type 2 diabetes, your health  care provider may recommend an eye exam as soon as you are diagnosed, and then once a year after your first exam.  Immunizations    · The yearly flu (influenza) vaccine is recommended for everyone 6 months or older who has diabetes.  · The pneumonia (pneumococcal) vaccine is recommended for everyone 2 years or older who has diabetes. If you are 65 or older, you may get the pneumonia vaccine as a series of two separate shots.  · The hepatitis B vaccine is recommended for adults shortly after being diagnosed with diabetes.  · Adults and children with diabetes should receive all other vaccines according to age-specific recommendations from the Centers for Disease Control and Prevention (CDC).  Mental and emotional health  Screening for symptoms of eating disorders, anxiety, and depression is recommended at the time of diagnosis and afterward as needed. If your screening shows that you have symptoms (positive screening result), you may need more evaluation and you may work with a mental health care provider.  Treatment plan  Your treatment plan will be reviewed at every medical visit. You and your health care provider will discuss:  · How you are taking your medicines, including insulin.  · Any side effects you are experiencing.  · Your blood glucose target goals.  · The frequency of your blood glucose monitoring.  · Lifestyle habits, such as activity level as well as tobacco, alcohol, and substance use.  Diabetes self-management education  Your health care provider will assess how well you are monitoring your blood glucose levels and whether you are taking your insulin correctly. He or she may refer you to:  · A certified diabetes educator to manage your diabetes throughout your life, starting at diagnosis.  · A registered dietitian who can create or review your personal nutrition plan.  · An exercise specialist who can discuss your activity level and exercise plan.  Summary  · Managing diabetes (diabetes mellitus) can  be complicated. Your diabetes treatment may be managed by a team of health care providers.  · Your health care providers follow guidelines in order to help you get the best quality of care.  · Standards of care including having regular physical exams, blood tests, blood pressure monitoring, immunizations, screening tests, and education about how to manage your diabetes.  · Your health care providers may also give you more specific instructions based on your individual health.  This information is not intended to replace advice given to you by your health care provider. Make sure you discuss any questions you have with your health care provider.  Document Released: 10/15/2010 Document Revised: 01/14/2020 Document Reviewed: 09/15/2017  [employer] Interactive Patient Education © 2020 [employer]      Preventive Care 40-64 Years Old, Female  Preventive care refers to visits with your health care provider and lifestyle choices that can promote health and wellness. This includes:  · A yearly physical exam. This may also be called an annual well check.  · Regular dental visits and eye exams.  · Immunizations.  · Screening for certain conditions.  · Healthy lifestyle choices, such as eating a healthy diet, getting regular exercise, not using drugs or products that contain nicotine and tobacco, and limiting alcohol use.  What can I expect for my preventive care visit?  Physical exam  Your health care provider will check your:  · Height and weight. This may be used to calculate body mass index (BMI), which tells if you are at a healthy weight.  · Heart rate and blood pressure.  · Skin for abnormal spots.  Counseling  Your health care provider may ask you questions about your:  · Alcohol, tobacco, and drug use.  · Emotional well-being.  · Home and relationship well-being.  · Sexual activity.  · Eating habits.  · Work and work environment.  · Method of birth control.  · Menstrual cycle.  · Pregnancy history.  What immunizations  do I need?    Influenza (flu) vaccine  · This is recommended every year.  Tetanus, diphtheria, and pertussis (Tdap) vaccine  · You may need a Td booster every 10 years.  Varicella (chickenpox) vaccine  · You may need this if you have not been vaccinated.  Zoster (shingles) vaccine  · You may need this after age 60.  Measles, mumps, and rubella (MMR) vaccine  · You may need at least one dose of MMR if you were born in 1957 or later. You may also need a second dose.  Pneumococcal conjugate (PCV13) vaccine  · You may need this if you have certain conditions and were not previously vaccinated.  Pneumococcal polysaccharide (PPSV23) vaccine  · You may need one or two doses if you smoke cigarettes or if you have certain conditions.  Meningococcal conjugate (MenACWY) vaccine  · You may need this if you have certain conditions.  Hepatitis A vaccine  · You may need this if you have certain conditions or if you travel or work in places where you may be exposed to hepatitis A.  Hepatitis B vaccine  · You may need this if you have certain conditions or if you travel or work in places where you may be exposed to hepatitis B.  Haemophilus influenzae type b (Hib) vaccine  · You may need this if you have certain conditions.  Human papillomavirus (HPV) vaccine  · If recommended by your health care provider, you may need three doses over 6 months.  You may receive vaccines as individual doses or as more than one vaccine together in one shot (combination vaccines). Talk with your health care provider about the risks and benefits of combination vaccines.  What tests do I need?  Blood tests  · Lipid and cholesterol levels. These may be checked every 5 years, or more frequently if you are over 50 years old.  · Hepatitis C test.  · Hepatitis B test.  Screening  · Lung cancer screening. You may have this screening every year starting at age 55 if you have a 30-pack-year history of smoking and currently smoke or have quit within the past 15  years.  · Colorectal cancer screening. All adults should have this screening starting at age 50 and continuing until age 75. Your health care provider may recommend screening at age 45 if you are at increased risk. You will have tests every 1-10 years, depending on your results and the type of screening test.  · Diabetes screening. This is done by checking your blood sugar (glucose) after you have not eaten for a while (fasting). You may have this done every 1-3 years.  · Mammogram. This may be done every 1-2 years. Talk with your health care provider about when you should start having regular mammograms. This may depend on whether you have a family history of breast cancer.  · BRCA-related cancer screening. This may be done if you have a family history of breast, ovarian, tubal, or peritoneal cancers.  · Pelvic exam and Pap test. This may be done every 3 years starting at age 21. Starting at age 30, this may be done every 5 years if you have a Pap test in combination with an HPV test.  Other tests  · Sexually transmitted disease (STD) testing.  · Bone density scan. This is done to screen for osteoporosis. You may have this scan if you are at high risk for osteoporosis.  Follow these instructions at home:  Eating and drinking  · Eat a diet that includes fresh fruits and vegetables, whole grains, lean protein, and low-fat dairy.  · Take vitamin and mineral supplements as recommended by your health care provider.  · Do not drink alcohol if:  ? Your health care provider tells you not to drink.  ? You are pregnant, may be pregnant, or are planning to become pregnant.  · If you drink alcohol:  ? Limit how much you have to 0-1 drink a day.  ? Be aware of how much alcohol is in your drink. In the [place] one drink equals one 12 oz bottle of beer (355 mL), one 5 oz glass of wine (148 mL), or one 1½ oz glass of hard liquor (44 mL).  Lifestyle  · Take daily care of your teeth and gums.  · Stay active. Exercise for at least 30  minutes on 5 or more days each week.  · Do not use any products that contain nicotine or tobacco, such as cigarettes, e-cigarettes, and chewing tobacco. If you need help quitting, ask your health care provider.  · If you are sexually active, practice safe sex. Use a condom or other form of birth control (contraception) in order to prevent pregnancy and STIs (sexually transmitted infections).  · If told by your health care provider, take low-dose aspirin daily starting at age 50.  What's next?  · Visit your health care provider once a year for a well check visit.  · Ask your health care provider how often you should have your eyes and teeth checked.  · Stay up to date on all vaccines.  This information is not intended to replace advice given to you by your health care provider. Make sure you discuss any questions you have with your health care provider.  Document Released: 01/13/2017 Document Revised: 08/29/2019 Document Reviewed: 08/29/2019  [employer] Interactive Patient Education © 2020 [employer]

## 2020-05-09 LAB — C PEPTIDE SERPL-MCNC: 2.6 NG/ML (ref 1.1–4.4)

## 2020-05-11 RX ORDER — LISINOPRIL 20 MG/1
TABLET ORAL
Qty: 30 TABLET | Refills: 5 | Status: SHIPPED | OUTPATIENT
Start: 2020-05-11 | End: 2020-11-09

## 2020-05-11 RX ORDER — QUETIAPINE FUMARATE 25 MG/1
TABLET, FILM COATED ORAL
Qty: 30 TABLET | Refills: 2 | Status: SHIPPED | OUTPATIENT
Start: 2020-05-11 | End: 2020-07-27

## 2020-05-11 NOTE — TELEPHONE ENCOUNTER
----- Message from Chris Spencer MD sent at 5/10/2020  4:35 PM CDT -----  Blood sugars improved some, still not at goal of 7 or less. HgbA1c was 8.76 now 8.15. Cholesterol is at goal. Other labs OK. Will go over at next visit. Recheck HgbA1c in 3 months.

## 2020-06-17 ENCOUNTER — HOSPITAL ENCOUNTER (OUTPATIENT)
Dept: MRI IMAGING | Facility: HOSPITAL | Age: 50
Discharge: HOME | End: 2020-06-17
Admitting: NURSE PRACTITIONER

## 2020-06-17 DIAGNOSIS — M54.50 LUMBAR PAIN: ICD-10-CM

## 2020-06-17 DIAGNOSIS — M79.605 LEFT LEG PAIN: ICD-10-CM

## 2020-06-17 PROCEDURE — 72148 MRI LUMBAR SPINE W/O DYE: CPT

## 2020-07-08 RX ORDER — INSULIN GLARGINE 100 [IU]/ML
INJECTION, SOLUTION SUBCUTANEOUS
Qty: 18 PEN | Refills: 4 | Status: SHIPPED | OUTPATIENT
Start: 2020-07-08 | End: 2021-06-02 | Stop reason: SDUPTHER

## 2020-07-20 RX ORDER — EMPAGLIFLOZIN 25 MG/1
TABLET, FILM COATED ORAL
Qty: 30 TABLET | Refills: 3 | Status: SHIPPED | OUTPATIENT
Start: 2020-07-20 | End: 2020-12-10

## 2020-07-27 RX ORDER — QUETIAPINE FUMARATE 25 MG/1
TABLET, FILM COATED ORAL
Qty: 30 TABLET | Refills: 2 | Status: SHIPPED | OUTPATIENT
Start: 2020-07-27 | End: 2020-12-31

## 2020-08-03 ENCOUNTER — OFFICE VISIT (OUTPATIENT)
Dept: FAMILY MEDICINE CLINIC | Facility: CLINIC | Age: 50
End: 2020-08-03

## 2020-08-03 VITALS
DIASTOLIC BLOOD PRESSURE: 70 MMHG | HEIGHT: 73 IN | HEART RATE: 87 BPM | OXYGEN SATURATION: 98 % | SYSTOLIC BLOOD PRESSURE: 120 MMHG | WEIGHT: 220.5 LBS | BODY MASS INDEX: 29.22 KG/M2 | TEMPERATURE: 97.7 F

## 2020-08-03 DIAGNOSIS — I10 ESSENTIAL HYPERTENSION: ICD-10-CM

## 2020-08-03 DIAGNOSIS — E11.65 UNCONTROLLED TYPE 2 DIABETES MELLITUS WITH HYPERGLYCEMIA (HCC): ICD-10-CM

## 2020-08-03 DIAGNOSIS — M51.36 DDD (DEGENERATIVE DISC DISEASE), LUMBAR: ICD-10-CM

## 2020-08-03 DIAGNOSIS — E11.42 DIABETIC POLYNEUROPATHY ASSOCIATED WITH TYPE 2 DIABETES MELLITUS (HCC): ICD-10-CM

## 2020-08-03 DIAGNOSIS — F41.8 SITUATIONAL ANXIETY: ICD-10-CM

## 2020-08-03 DIAGNOSIS — Z72.0 TOBACCO USER: ICD-10-CM

## 2020-08-03 PROCEDURE — 99214 OFFICE O/P EST MOD 30 MIN: CPT | Performed by: FAMILY MEDICINE

## 2020-08-03 NOTE — PROGRESS NOTES
Subjective   Mine Benedict is a 50 y.o. Obese white male with uncontrolled DM II, HTN, Diabetic neuropathy, Depression, Chronic Low back pain H/O Lumbar Laminectomy, and other health problems, see PMH/PSH. Pt presents for exam, to discuss health problems, Tx and F/U plan.    ' Have been eating less and getting more exercise, lost weight, hope Blood sugars have improved. Stress still high after Mother's CVA, she is total care at home. Had MRI, seeing Dr. Cornelius, at some point will need back surgery again. B/P has been controlled'.     Diabetes   He has type 2 diabetes mellitus. No MedicAlert identification noted. The initial diagnosis of diabetes was made 27 years ago. Hypoglycemia symptoms include sleepiness and sweats. Pertinent negatives for hypoglycemia include no confusion, dizziness, headaches, hunger, mood changes, nervousness/anxiousness, pallor, seizures, speech difficulty or tremors. Associated symptoms include fatigue, foot paresthesias, polydipsia, polyuria, weakness and weight loss. Pertinent negatives for diabetes include no blurred vision, no chest pain, no foot ulcerations and no polyphagia. Hypoglycemia complications include nocturnal hypoglycemia. Pertinent negatives for hypoglycemia complications include no blackouts, no hospitalization, no required assistance and no required glucagon injection. Symptoms are stable. Diabetic complications include impotence, peripheral neuropathy and retinopathy. Pertinent negatives for diabetic complications include no CVA, heart disease, nephropathy or PVD. Risk factors for coronary artery disease include hypertension, obesity, sedentary lifestyle, stress and tobacco exposure. Current diabetic treatment includes diet, insulin injections and oral agent (dual therapy). He is compliant with treatment all of the time. He is currently taking insulin pre-breakfast, pre-dinner and at bedtime. Insulin injections are given by patient. Rotation sites for injection include  the abdominal wall and thighs. His weight is decreasing steadily. He is following a generally healthy diet. Meal planning includes carbohydrate counting. He has not had a previous visit with a dietitian. He participates in exercise weekly. He monitors blood glucose at home 1-2 x per day. He monitors urine at home <1 x per month. Blood glucose monitoring compliance is fair. His home blood glucose trend is fluctuating minimally. His breakfast blood glucose is taken between 8-9 am. His breakfast blood glucose range is generally  mg/dl. His lunch blood glucose is taken between 12-1 pm. His lunch blood glucose range is generally 130-140 mg/dl. His dinner blood glucose is taken between 7-8 pm. His dinner blood glucose range is generally 130-140 mg/dl. His overall blood glucose range is 140-180 mg/dl. He does not see a podiatrist.Eye exam is not current.   Hypertension   This is a chronic problem. The current episode started more than 1 year ago. The problem is controlled. Associated symptoms include sweats. Pertinent negatives include no blurred vision, chest pain, headaches, palpitations or shortness of breath. Agents associated with hypertension include NSAIDs. Risk factors for coronary artery disease include smoking/tobacco exposure, stress, obesity, male gender and diabetes mellitus. Past treatments include ACE inhibitors. Current antihypertension treatment includes ACE inhibitors. The current treatment provides significant improvement. Hypertensive end-organ damage includes retinopathy. There is no history of CVA or PVD.   Insomnia   This is a recurrent problem. The current episode started more than 1 month ago. The problem occurs daily. The problem has been waxing and waning. Associated symptoms include fatigue and weakness. Pertinent negatives include no chest pain, chills, fever or headaches. Associated symptoms comments: Insomnia, mind racing. . The symptoms are aggravated by stress and smoking. He has  tried relaxation (Doxepin) for the symptoms. The treatment provided mild relief.   Depression   Visit Type: follow-up  Patient presents with the following symptoms: decreased concentration, depressed mood, impotence, insomnia and weight loss.  Patient is not experiencing: confusion, nervousness/anxiety, palpitations, shortness of breath and suicidal ideas.  Frequency of symptoms: occasionally   Severity: moderate   Sleep quality: fair  Compliance with medications:  51-75%        Neurologic Problem   The patient's primary symptoms include weakness. The patient's pertinent negatives include no syncope. Primary symptoms comment: Diabetic Neuropathy. This is a chronic problem. The current episode started more than 1 year ago. The neurological problem developed gradually. The problem has been gradually worsening since onset. Associated symptoms include back pain and fatigue. Pertinent negatives include no chest pain, confusion, dizziness, fever, headaches, light-headedness, palpitations or shortness of breath. Treatments tried: Neurontin, Cymbalta. The treatment provided moderate relief. There is no history of mood changes.   Back Pain   This is a recurrent problem. The current episode started more than 1 month ago. The problem occurs constantly. The problem has been gradually worsening since onset. The pain is present in the lumbar spine. The pain radiates to the left thigh. The pain is at a severity of 6/10. The pain is severe. The pain is worse during the night. The symptoms are aggravated by position. Associated symptoms include weakness and weight loss. Pertinent negatives include no chest pain, dysuria, fever or headaches. Risk factors: H/O lumbar fusion. He has tried analgesics, home exercises, heat, muscle relaxant and NSAIDs for the symptoms. The treatment provided mild relief.        The following portions of the patient's history were reviewed and updated as appropriate: allergies, current medications, past  family history, past medical history, past social history, past surgical history and problem list.    Review of Systems   Constitutional: Positive for fatigue and weight loss. Negative for activity change, chills and fever.   HENT: Negative for dental problem, ear pain and postnasal drip.    Eyes: Negative for blurred vision, pain, discharge and visual disturbance.   Respiratory: Negative for apnea, shortness of breath and wheezing.    Cardiovascular: Negative for chest pain, palpitations and leg swelling.   Gastrointestinal: Negative for blood in stool and constipation.   Endocrine: Positive for polydipsia and polyuria. Negative for cold intolerance, heat intolerance and polyphagia.   Genitourinary: Positive for impotence. Negative for discharge and dysuria.   Musculoskeletal: Positive for back pain.        Sciatica, L side flaring       Skin: Negative for color change, pallor and wound.   Allergic/Immunologic: Negative for environmental allergies and food allergies.   Neurological: Positive for weakness. Negative for dizziness, tremors, seizures, syncope, facial asymmetry, speech difficulty, light-headedness and headaches.        Diabetic Neuropathy   Hematological: Negative.    Psychiatric/Behavioral: Positive for decreased concentration, dysphoric mood and sleep disturbance. Negative for agitation, behavioral problems, confusion, hallucinations, self-injury and suicidal ideas. The patient has insomnia. The patient is not nervous/anxious.        Objective   Physical Exam   Constitutional: He is oriented to person, place, and time. He appears well-developed and well-nourished. No distress.   HENT:   Head: Normocephalic and atraumatic.   Right Ear: External ear normal.   Left Ear: External ear normal.   Nose: Nose normal.   Mouth/Throat: Oropharynx is clear and moist.   Eyes: Pupils are equal, round, and reactive to light. Conjunctivae and EOM are normal. Right eye exhibits no discharge. Left eye exhibits no  discharge. No scleral icterus.   Neck: Normal range of motion. No JVD present. No tracheal deviation present. No thyromegaly present.   Cardiovascular: Normal rate, regular rhythm, normal heart sounds and intact distal pulses. Exam reveals no gallop and no friction rub.   No murmur heard.  Pulmonary/Chest: Effort normal and breath sounds normal. No stridor. No respiratory distress. He has no wheezes. He has no rales. He exhibits no tenderness.   Abdominal: Soft. Bowel sounds are normal. He exhibits no distension and no mass. There is no tenderness. There is no rebound and no guarding. No hernia.   Musculoskeletal: He exhibits no edema, tenderness or deformity.   WB 6 with ROM low back    Mine had a diabetic foot exam performed today.    Neurological Sensory Findings - Altered hot/cold left ankle/foot discrimination: L little toe swollen. Unaltered sharp/dull right ankle/foot discrimination and unaltered sharp/dull left ankle/foot discrimination.  Vascular Status -  His right foot exhibits normal foot vasculature . His left foot exhibits normal foot vasculature .  Skin Integrity  -  His right foot skin is intact.His left foot skin is intact..   Foot Structure and Biomechanics -  His right foot exhibits hallux valgus.  His left foot exhibits hallux valgus.  Lymphadenopathy:     He has no cervical adenopathy.   Neurological: He is alert and oriented to person, place, and time. He has normal reflexes. He displays normal reflexes. A sensory deficit is present. No cranial nerve deficit. He exhibits normal muscle tone. Coordination normal.   Skin: Skin is warm and dry. Capillary refill takes 2 to 3 seconds. No rash noted. He is not diaphoretic. No erythema. No pallor.   Psychiatric: He has a normal mood and affect. His behavior is normal. Judgment and thought content normal.   Nursing note and vitals reviewed.      Assessment/Plan   Mine was seen today for diabetes, hypertension, hyperlipidemia and  depression.    Diagnoses and all orders for this visit:    Uncontrolled type 2 diabetes mellitus with hyperglycemia (CMS/[facility])  -     Hemoglobin A1c; Future    BMI 29.0-29.9,adult    DDD (degenerative disc disease), lumbar    Diabetic polyneuropathy associated with type 2 diabetes mellitus (CMS/[facility])    Essential hypertension    Tobacco user    Situational anxiety    Other orders  -     mupirocin (Bactroban) 2 % ointment; Apply  topically to the appropriate area as directed 3 (Three) Times a Day.    Discussed exam, health problems, meds, indications, tx plan, rationale. Discussed rechecking HgbA1c. Discussed F/u with specialist. Discussed f/u here.        This document has been electronically signed by Chris Spencer MD

## 2020-09-03 NOTE — TELEPHONE ENCOUNTER
Attempted to call pt.  No answer.  LM for pt to call office.    [Follow-Up] : a follow-up visit for [Mother] : mother

## 2020-09-10 ENCOUNTER — LAB (OUTPATIENT)
Dept: LAB | Facility: HOSPITAL | Age: 50
End: 2020-09-10

## 2020-09-10 DIAGNOSIS — E11.65 UNCONTROLLED TYPE 2 DIABETES MELLITUS WITH HYPERGLYCEMIA (HCC): ICD-10-CM

## 2020-09-10 PROCEDURE — 83036 HEMOGLOBIN GLYCOSYLATED A1C: CPT

## 2020-09-11 LAB — HBA1C MFR BLD: 7.8 % (ref 4.8–5.6)

## 2020-09-14 ENCOUNTER — OFFICE VISIT (OUTPATIENT)
Dept: FAMILY MEDICINE CLINIC | Facility: CLINIC | Age: 50
End: 2020-09-14

## 2020-09-14 ENCOUNTER — TELEPHONE (OUTPATIENT)
Dept: FAMILY MEDICINE CLINIC | Facility: CLINIC | Age: 50
End: 2020-09-14

## 2020-09-14 VITALS
WEIGHT: 214 LBS | TEMPERATURE: 98.3 F | DIASTOLIC BLOOD PRESSURE: 66 MMHG | HEART RATE: 84 BPM | OXYGEN SATURATION: 95 % | SYSTOLIC BLOOD PRESSURE: 120 MMHG | HEIGHT: 73 IN | BODY MASS INDEX: 28.36 KG/M2

## 2020-09-14 DIAGNOSIS — M54.32 SCIATICA OF LEFT SIDE: ICD-10-CM

## 2020-09-14 DIAGNOSIS — F32.9 DEPRESSION, REACTIVE: ICD-10-CM

## 2020-09-14 DIAGNOSIS — Z79.4 TYPE 2 DIABETES MELLITUS WITH COMPLICATION, WITH LONG-TERM CURRENT USE OF INSULIN (HCC): ICD-10-CM

## 2020-09-14 DIAGNOSIS — Z23 NEED FOR IMMUNIZATION AGAINST INFLUENZA: ICD-10-CM

## 2020-09-14 DIAGNOSIS — M54.50 LUMBAR PAIN: ICD-10-CM

## 2020-09-14 DIAGNOSIS — Z72.0 TOBACCO USER: ICD-10-CM

## 2020-09-14 DIAGNOSIS — E11.8 TYPE 2 DIABETES MELLITUS WITH COMPLICATION, WITH LONG-TERM CURRENT USE OF INSULIN (HCC): ICD-10-CM

## 2020-09-14 DIAGNOSIS — I10 ESSENTIAL HYPERTENSION: ICD-10-CM

## 2020-09-14 PROCEDURE — 90471 IMMUNIZATION ADMIN: CPT | Performed by: FAMILY MEDICINE

## 2020-09-14 PROCEDURE — 99214 OFFICE O/P EST MOD 30 MIN: CPT | Performed by: FAMILY MEDICINE

## 2020-09-14 PROCEDURE — 90686 IIV4 VACC NO PRSV 0.5 ML IM: CPT | Performed by: FAMILY MEDICINE

## 2020-09-14 RX ORDER — HYDROCODONE BITARTRATE AND ACETAMINOPHEN 10; 325 MG/1; MG/1
1 TABLET ORAL EVERY 6 HOURS PRN
COMMUNITY

## 2020-09-14 RX ORDER — NAPROXEN 500 MG/1
TABLET ORAL
COMMUNITY
Start: 2020-09-13

## 2020-09-14 NOTE — TELEPHONE ENCOUNTER
----- Message from Chris Spencer MD sent at 9/13/2020  6:54 PM CDT -----  Have some change in L lower lung probably fibrotic. Otherwise OK.

## 2020-09-14 NOTE — PROGRESS NOTES
"Subjective    Mine Benedict is a 50 y.o. Obese white male with uncontrolled DM II, HTN, Diabetic neuropathy, Depression, Chronic Low back pain H/O Lumbar Laminectomy, and other health problems, see PMH/PSH. Pt presents for exam, to discuss health problems, Tx and F/U plan.     \" Was recently in in shelter now wearing ankle monitor. Was not able to get routine meds, Blood sugars have been labile. Back Pain has increased,using cane, will have F/u with Dr. Cornelius. B/P has been OK. Anxiety has increased, would like to restart Cymbalta'.     Diabetes  He has type 2 diabetes mellitus. No MedicAlert identification noted. The initial diagnosis of diabetes was made 27 years ago. Hypoglycemia symptoms include sleepiness and sweats. Pertinent negatives for hypoglycemia include no confusion, dizziness, headaches, hunger, mood changes, nervousness/anxiousness, pallor, seizures, speech difficulty or tremors. Associated symptoms include fatigue, foot paresthesias, polydipsia, polyuria, weakness and weight loss. Pertinent negatives for diabetes include no blurred vision, no chest pain, no foot ulcerations and no polyphagia. Hypoglycemia complications include nocturnal hypoglycemia. Pertinent negatives for hypoglycemia complications include no blackouts, no hospitalization, no required assistance and no required glucagon injection. Symptoms are stable. Diabetic complications include impotence, peripheral neuropathy and retinopathy. Pertinent negatives for diabetic complications include no CVA, heart disease, nephropathy or PVD. Risk factors for coronary artery disease include hypertension, obesity, sedentary lifestyle, stress and tobacco exposure. Current diabetic treatment includes diet, insulin injections and oral agent (dual therapy). He is compliant with treatment all of the time. He is currently taking insulin pre-breakfast, pre-dinner and at bedtime. Insulin injections are given by patient. Rotation sites for injection include " the abdominal wall and thighs. His weight is decreasing steadily. He is following a generally healthy diet. Meal planning includes carbohydrate counting. He has not had a previous visit with a dietitian. He participates in exercise weekly. He monitors blood glucose at home 1-2 x per day. He monitors urine at home <1 x per month. Blood glucose monitoring compliance is fair. His home blood glucose trend is fluctuating minimally. His breakfast blood glucose is taken between 8-9 am. His breakfast blood glucose range is generally  mg/dl. His lunch blood glucose is taken between 12-1 pm. His lunch blood glucose range is generally 130-140 mg/dl. His dinner blood glucose is taken between 7-8 pm. His dinner blood glucose range is generally 130-140 mg/dl. His overall blood glucose range is 140-180 mg/dl. He does not see a podiatrist.Eye exam is not current.   Back Pain  This is a recurrent problem. The current episode started more than 1 month ago. The problem occurs constantly. The problem has been gradually worsening since onset. The pain is present in the lumbar spine. The pain radiates to the left thigh. The pain is at a severity of 6/10. The pain is severe. The pain is worse during the night. The symptoms are aggravated by position. Associated symptoms include weakness and weight loss. Pertinent negatives include no chest pain, dysuria, fever or headaches. Risk factors: H/O lumbar fusion. He has tried analgesics, home exercises, heat, muscle relaxant and NSAIDs for the symptoms. The treatment provided mild relief.   Hypertension  This is a chronic problem. The current episode started more than 1 year ago. The problem is controlled. Associated symptoms include sweats. Pertinent negatives include no blurred vision, chest pain, headaches, palpitations or shortness of breath. Agents associated with hypertension include NSAIDs. Risk factors for coronary artery disease include smoking/tobacco exposure, stress, obesity,  male gender and diabetes mellitus. Past treatments include ACE inhibitors. Current antihypertension treatment includes ACE inhibitors. The current treatment provides significant improvement. Hypertensive end-organ damage includes retinopathy. There is no history of CVA or PVD.   Insomnia  This is a recurrent problem. The current episode started more than 1 month ago. The problem occurs daily. The problem has been waxing and waning. Associated symptoms include fatigue and weakness. Pertinent negatives include no chest pain, chills, fever or headaches. Associated symptoms comments: Insomnia, mind racing. . The symptoms are aggravated by stress and smoking. He has tried relaxation (Doxepin) for the symptoms. The treatment provided mild relief.   Depression  Visit Type: follow-up  Patient presents with the following symptoms: decreased concentration, depressed mood, impotence, insomnia and weight loss.  Patient is not experiencing: confusion, nervousness/anxiety, palpitations, shortness of breath and suicidal ideas.  Frequency of symptoms: occasionally   Severity: moderate   Sleep quality: fair  Compliance with medications:  51-75%        Neurologic Problem  The patient's primary symptoms include weakness. The patient's pertinent negatives include no syncope. Primary symptoms comment: Diabetic Neuropathy. This is a chronic problem. The current episode started more than 1 year ago. The neurological problem developed gradually. The problem has been gradually worsening since onset. Associated symptoms include back pain and fatigue. Pertinent negatives include no chest pain, confusion, dizziness, fever, headaches, light-headedness, palpitations or shortness of breath. Treatments tried: Neurontin, Cymbalta. The treatment provided moderate relief. There is no history of mood changes.        The following portions of the patient's history were reviewed and updated as appropriate: allergies, current medications, past family  history, past medical history, past social history, past surgical history and problem list.    Review of Systems   Constitutional: Positive for fatigue and weight loss. Negative for activity change, chills and fever.   HENT: Negative for dental problem, ear pain and postnasal drip.    Eyes: Negative for blurred vision, pain, discharge and visual disturbance.   Respiratory: Negative for apnea, shortness of breath and wheezing.    Cardiovascular: Negative for chest pain, palpitations and leg swelling.   Gastrointestinal: Negative for blood in stool and constipation.   Endocrine: Positive for polydipsia and polyuria. Negative for cold intolerance, heat intolerance and polyphagia.   Genitourinary: Positive for impotence. Negative for discharge and dysuria.   Musculoskeletal: Positive for back pain.        Sciatica, L side flaring       Skin: Negative for color change, pallor and wound.   Allergic/Immunologic: Negative for environmental allergies and food allergies.   Neurological: Positive for weakness. Negative for dizziness, tremors, seizures, syncope, facial asymmetry, speech difficulty, light-headedness and headaches.        Diabetic Neuropathy   Hematological: Negative.    Psychiatric/Behavioral: Positive for decreased concentration, dysphoric mood and sleep disturbance. Negative for agitation, behavioral problems, confusion, hallucinations, self-injury and suicidal ideas. The patient has insomnia. The patient is not nervous/anxious.        Objective   Physical Exam  Vitals signs and nursing note reviewed.   Constitutional:       General: He is not in acute distress.     Appearance: Normal appearance. He is obese.   HENT:      Head: Normocephalic.      Right Ear: Tympanic membrane normal.      Left Ear: Tympanic membrane normal.      Nose: Nose normal.      Mouth/Throat:      Mouth: Mucous membranes are moist.      Pharynx: Oropharynx is clear. No oropharyngeal exudate.   Eyes:      General: No scleral icterus.         Right eye: No discharge.         Left eye: No discharge.      Pupils: Pupils are equal, round, and reactive to light.   Neck:      Musculoskeletal: Normal range of motion.   Cardiovascular:      Rate and Rhythm: Normal rate and regular rhythm.      Heart sounds: No murmur. No gallop.    Pulmonary:      Effort: Pulmonary effort is normal. No respiratory distress.      Breath sounds: Normal breath sounds. No rhonchi.   Abdominal:      Palpations: Abdomen is soft.      Tenderness: There is no abdominal tenderness. There is no left CVA tenderness.   Musculoskeletal: Normal range of motion.   Skin:     General: Skin is warm and dry.      Capillary Refill: Capillary refill takes 2 to 3 seconds.   Neurological:      Mental Status: He is alert.      Cranial Nerves: No cranial nerve deficit.      Sensory: No sensory deficit.      Gait: Gait abnormal.      Comments: Walking with cane   Psychiatric:         Mood and Affect: Mood normal.         Thought Content: Thought content normal.         Judgment: Judgment normal.         Assessment/Plan   Mine was seen today for diabetes and back pain.    Diagnoses and all orders for this visit:    Need for immunization against influenza  -     FluLaval Quad >6 Months (3925-3060)    Type 2 diabetes mellitus with complication, with long-term current use of insulin (CMS/[facility])    Tobacco user    Sciatica of left side    Lumbar pain    Essential hypertension    Depression, reactive      Discussed exam, health problems, meds, indications, tx plan, Std of care for DM. Discussed Back pain F/U with specialist. Discussed increased stress, situational depression, coping, CBT, benefits of counseling. Discussed F/U plans.        This document has been electronically signed by Chris Spencer MD

## 2020-10-14 DIAGNOSIS — G62.9 NEUROPATHY: ICD-10-CM

## 2020-10-15 RX ORDER — GABAPENTIN 800 MG/1
800 TABLET ORAL 4 TIMES DAILY
Qty: 120 TABLET | Refills: 3 | Status: SHIPPED | OUTPATIENT
Start: 2020-10-15 | End: 2021-03-04 | Stop reason: SDUPTHER

## 2020-10-15 NOTE — TELEPHONE ENCOUNTER
Patient stated that he failed to get a prescription for gabapentin for his neuropathy at his appt last month.  He stated that it helps his pain with a rating of 4/10.  Pt denies any adverse reactions/side effects.  He has an appt scheduled for December. Do you want to do a telephone visit?

## 2020-11-04 ENCOUNTER — TELEPHONE (OUTPATIENT)
Dept: FAMILY MEDICINE CLINIC | Facility: CLINIC | Age: 50
End: 2020-11-04

## 2020-11-04 DIAGNOSIS — G62.9 NEUROPATHY: ICD-10-CM

## 2020-11-04 RX ORDER — GABAPENTIN 800 MG/1
800 TABLET ORAL 4 TIMES DAILY
Qty: 120 TABLET | Refills: 3 | Status: CANCELLED | OUTPATIENT
Start: 2020-11-04

## 2020-11-04 NOTE — TELEPHONE ENCOUNTER
Returned call to pt to let him know his script has been ready in the office for him to  since 10/15/20. Pt will come  at his earliest convenience.

## 2020-11-04 NOTE — TELEPHONE ENCOUNTER
Caller: Mine Benedict    Relationship: Self    Best call back number:[phone]  PLEASE CALL PATIENT BACK REGARDLESS    Medication needed:   Requested Prescriptions     Pending Prescriptions Disp Refills   • gabapentin (NEURONTIN) 800 MG tablet 120 tablet 3     Sig: Take 1 tablet by mouth 4 (Four) Times a Day.       When do you need the refill by: 11/04/2020    What details did the patient provide when requesting the medication: PATIENT HAS REQUESTED THIS ON THE  October 15TH    Does the patient have less than a 3 day supply:  [x] Yes  [] No    [facility] [place] [address] [address] AT [facility] [address] - [phone] [facility] [phone] FX

## 2020-11-09 RX ORDER — LISINOPRIL 20 MG/1
TABLET ORAL
Qty: 30 TABLET | Refills: 2 | Status: SHIPPED | OUTPATIENT
Start: 2020-11-09 | End: 2021-02-09

## 2020-12-10 RX ORDER — EMPAGLIFLOZIN 25 MG/1
TABLET, FILM COATED ORAL
Qty: 30 TABLET | Refills: 2 | Status: SHIPPED | OUTPATIENT
Start: 2020-12-10 | End: 2021-03-15

## 2020-12-14 ENCOUNTER — OFFICE VISIT (OUTPATIENT)
Dept: FAMILY MEDICINE CLINIC | Facility: CLINIC | Age: 50
End: 2020-12-14

## 2020-12-14 VITALS
DIASTOLIC BLOOD PRESSURE: 70 MMHG | SYSTOLIC BLOOD PRESSURE: 130 MMHG | OXYGEN SATURATION: 98 % | BODY MASS INDEX: 29.21 KG/M2 | HEIGHT: 73 IN | WEIGHT: 220.4 LBS | HEART RATE: 113 BPM | TEMPERATURE: 96.4 F

## 2020-12-14 DIAGNOSIS — E11.8 TYPE 2 DIABETES MELLITUS WITH COMPLICATION, WITH LONG-TERM CURRENT USE OF INSULIN (HCC): ICD-10-CM

## 2020-12-14 DIAGNOSIS — Z23 NEED FOR PROPHYLACTIC VACCINATION AGAINST STREPTOCOCCUS PNEUMONIAE (PNEUMOCOCCUS): ICD-10-CM

## 2020-12-14 DIAGNOSIS — E11.65 UNCONTROLLED TYPE 2 DIABETES MELLITUS WITH HYPERGLYCEMIA (HCC): ICD-10-CM

## 2020-12-14 DIAGNOSIS — Z79.4 TYPE 2 DIABETES MELLITUS WITH OTHER SPECIFIED COMPLICATION, WITH LONG-TERM CURRENT USE OF INSULIN (HCC): ICD-10-CM

## 2020-12-14 DIAGNOSIS — E78.2 MIXED HYPERLIPIDEMIA: ICD-10-CM

## 2020-12-14 DIAGNOSIS — Z79.4 TYPE 2 DIABETES MELLITUS WITH COMPLICATION, WITH LONG-TERM CURRENT USE OF INSULIN (HCC): ICD-10-CM

## 2020-12-14 DIAGNOSIS — M54.50 LUMBAR PAIN: ICD-10-CM

## 2020-12-14 DIAGNOSIS — G62.9 NEUROPATHY: ICD-10-CM

## 2020-12-14 DIAGNOSIS — E11.69 TYPE 2 DIABETES MELLITUS WITH OTHER SPECIFIED COMPLICATION, WITH LONG-TERM CURRENT USE OF INSULIN (HCC): ICD-10-CM

## 2020-12-14 DIAGNOSIS — M54.32 SCIATICA OF LEFT SIDE: ICD-10-CM

## 2020-12-14 LAB
EXPIRATION DATE: ABNORMAL
HBA1C MFR BLD: 7.9 %
Lab: ABNORMAL

## 2020-12-14 PROCEDURE — 90732 PPSV23 VACC 2 YRS+ SUBQ/IM: CPT | Performed by: FAMILY MEDICINE

## 2020-12-14 PROCEDURE — 99214 OFFICE O/P EST MOD 30 MIN: CPT | Performed by: FAMILY MEDICINE

## 2020-12-14 PROCEDURE — 90471 IMMUNIZATION ADMIN: CPT | Performed by: FAMILY MEDICINE

## 2020-12-14 PROCEDURE — 83036 HEMOGLOBIN GLYCOSYLATED A1C: CPT | Performed by: FAMILY MEDICINE

## 2020-12-14 NOTE — PROGRESS NOTES
Subjective    Mine Benedict is a 50 y.o. Obese white male with uncontrolled DM II, HTN, Diabetic neuropathy, Depression, Chronic Low back pain H/O Lumbar Laminectomy, and other health problems, see PMH/PSH. Pt presents for exam, to discuss health problems, Tx and F/U plan..     ' Chronic back pain 6-8/10, [facility] approved back surgery with Dr. Chamorro. has to be done before Jan 6th 2021. B/P has been OK. Blood sugars improving.Mood stable'.      Diabetes  He has type 2 diabetes mellitus. No MedicAlert identification noted. The initial diagnosis of diabetes was made 27 years ago. Hypoglycemia symptoms include sleepiness and sweats. Pertinent negatives for hypoglycemia include no confusion, dizziness, headaches, hunger, mood changes, nervousness/anxiousness, pallor, seizures, speech difficulty or tremors. Associated symptoms include fatigue, foot paresthesias, polydipsia, polyuria, weakness and weight loss. Pertinent negatives for diabetes include no blurred vision, no chest pain, no foot ulcerations and no polyphagia. Hypoglycemia complications include nocturnal hypoglycemia. Pertinent negatives for hypoglycemia complications include no blackouts, no hospitalization, no required assistance and no required glucagon injection. Symptoms are stable. Diabetic complications include impotence, peripheral neuropathy and retinopathy. Pertinent negatives for diabetic complications include no CVA, heart disease, nephropathy or PVD. Risk factors for coronary artery disease include hypertension, obesity, sedentary lifestyle, stress and tobacco exposure. Current diabetic treatment includes diet, insulin injections and oral agent (dual therapy). He is compliant with treatment all of the time. He is currently taking insulin pre-breakfast, pre-dinner and at bedtime. Insulin injections are given by patient. Rotation sites for injection include the abdominal wall and thighs. His weight is decreasing steadily. He is following a generally  healthy diet. Meal planning includes carbohydrate counting. He has not had a previous visit with a dietitian. He participates in exercise weekly. He monitors blood glucose at home 1-2 x per day. He monitors urine at home <1 x per month. Blood glucose monitoring compliance is fair. His home blood glucose trend is fluctuating minimally. His breakfast blood glucose is taken between 8-9 am. His breakfast blood glucose range is generally  mg/dl. His lunch blood glucose is taken between 12-1 pm. His lunch blood glucose range is generally 130-140 mg/dl. His dinner blood glucose is taken between 7-8 pm. His dinner blood glucose range is generally 130-140 mg/dl. His overall blood glucose range is 140-180 mg/dl. He does not see a podiatrist.Eye exam is not current.   Hypertension  This is a chronic problem. The current episode started more than 1 year ago. The problem is controlled. Associated symptoms include sweats. Pertinent negatives include no blurred vision, chest pain, headaches, palpitations or shortness of breath. Agents associated with hypertension include NSAIDs. Risk factors for coronary artery disease include smoking/tobacco exposure, stress, obesity, male gender and diabetes mellitus. Past treatments include ACE inhibitors. Current antihypertension treatment includes ACE inhibitors. The current treatment provides significant improvement. Hypertensive end-organ damage includes retinopathy. There is no history of CVA or PVD.   Insomnia  This is a recurrent problem. The current episode started more than 1 month ago. The problem occurs daily. The problem has been waxing and waning. Associated symptoms include fatigue and weakness. Pertinent negatives include no chest pain, chills, fever or headaches. Associated symptoms comments: Insomnia, mind racing. . The symptoms are aggravated by stress and smoking. He has tried relaxation (Doxepin) for the symptoms. The treatment provided mild relief.   Depression  Visit  Type: follow-up  Patient presents with the following symptoms: decreased concentration, depressed mood, impotence, insomnia and weight loss.  Patient is not experiencing: confusion, nervousness/anxiety, palpitations, shortness of breath and suicidal ideas.  Frequency of symptoms: occasionally   Severity: moderate   Sleep quality: fair  Compliance with medications:  51-75%        Neurologic Problem  The patient's primary symptoms include weakness. The patient's pertinent negatives include no syncope. Primary symptoms comment: Diabetic Neuropathy. This is a chronic problem. The current episode started more than 1 year ago. The neurological problem developed gradually. The problem has been gradually worsening since onset. Associated symptoms include back pain and fatigue. Pertinent negatives include no chest pain, confusion, dizziness, fever, headaches, light-headedness, palpitations or shortness of breath. Treatments tried: Neurontin, Cymbalta. The treatment provided moderate relief. There is no history of mood changes.   Back Pain  This is a recurrent problem. The current episode started more than 1 month ago. The problem occurs constantly. The problem has been gradually worsening since onset. The pain is present in the lumbar spine. The pain radiates to the left thigh. The pain is at a severity of 6/10. The pain is severe. The pain is worse during the night. The symptoms are aggravated by position. Associated symptoms include weakness and weight loss. Pertinent negatives include no chest pain, dysuria, fever or headaches. Risk factors: H/O lumbar fusion. He has tried analgesics, home exercises, heat, muscle relaxant and NSAIDs for the symptoms. The treatment provided mild relief.        The following portions of the patient's history were reviewed and updated as appropriate: allergies, current medications, past family history, past medical history, past social history, past surgical history and problem  list.    Review of Systems   Constitutional: Positive for fatigue and weight loss. Negative for activity change, chills and fever.   HENT: Negative for dental problem, ear pain and postnasal drip.    Eyes: Negative for blurred vision, pain, discharge and visual disturbance.   Respiratory: Negative for apnea, shortness of breath and wheezing.    Cardiovascular: Negative for chest pain, palpitations and leg swelling.   Gastrointestinal: Negative for blood in stool and constipation.   Endocrine: Positive for polydipsia and polyuria. Negative for cold intolerance, heat intolerance and polyphagia.   Genitourinary: Positive for impotence. Negative for discharge and dysuria.   Musculoskeletal: Positive for back pain.        Sciatica, L side chronic       Skin: Negative for color change, pallor and wound.   Allergic/Immunologic: Negative for environmental allergies and food allergies.   Neurological: Positive for weakness. Negative for dizziness, tremors, seizures, syncope, facial asymmetry, speech difficulty, light-headedness and headaches.        Diabetic Neuropathy   Hematological: Negative.    Psychiatric/Behavioral: Positive for decreased concentration, dysphoric mood and sleep disturbance. Negative for agitation, behavioral problems, confusion, hallucinations, self-injury and suicidal ideas. The patient has insomnia. The patient is not nervous/anxious.        Objective   Physical Exam  Vitals signs and nursing note reviewed.   Constitutional:       General: He is not in acute distress.     Appearance: Normal appearance. He is obese. He is not ill-appearing or toxic-appearing.   HENT:      Head: Normocephalic.      Right Ear: Tympanic membrane, ear canal and external ear normal. There is no impacted cerumen.      Left Ear: Tympanic membrane, ear canal and external ear normal. There is no impacted cerumen.      Nose: Nose normal. No congestion or rhinorrhea.      Mouth/Throat:      Mouth: Mucous membranes are moist.       Pharynx: Oropharynx is clear. No oropharyngeal exudate or posterior oropharyngeal erythema.   Eyes:      General: No scleral icterus.        Right eye: No discharge.         Left eye: No discharge.      Pupils: Pupils are equal, round, and reactive to light.   Neck:      Musculoskeletal: No neck rigidity or muscular tenderness.      Vascular: No carotid bruit.   Cardiovascular:      Rate and Rhythm: Normal rate and regular rhythm.      Heart sounds: No murmur. No gallop.    Pulmonary:      Effort: Pulmonary effort is normal. No respiratory distress.      Breath sounds: Normal breath sounds. No wheezing, rhonchi or rales.   Abdominal:      General: Abdomen is flat. Bowel sounds are normal. There is no distension.      Palpations: Abdomen is soft. There is no mass.      Tenderness: There is no abdominal tenderness. There is no right CVA tenderness, left CVA tenderness, guarding or rebound.      Hernia: No hernia is present.   Musculoskeletal:         General: Tenderness present.   Lymphadenopathy:      Cervical: No cervical adenopathy.   Skin:     General: Skin is warm and dry.      Capillary Refill: Capillary refill takes 2 to 3 seconds.   Neurological:      Mental Status: He is alert and oriented to person, place, and time.      Cranial Nerves: No cranial nerve deficit.      Sensory: No sensory deficit.      Motor: No weakness.      Coordination: Coordination normal.      Gait: Gait abnormal.      Deep Tendon Reflexes: Reflexes normal.      Comments: Walking with cane   Psychiatric:         Mood and Affect: Mood normal.         Thought Content: Thought content normal.         Judgment: Judgment normal.         Assessment/Plan   Diagnoses and all orders for this visit:    1. Need for prophylactic vaccination against Streptococcus pneumoniae (pneumococcus)  -     Pneumococcal Polysaccharide Vaccine 23-Valent (PPSV23) Greater Than or Equal To 3yo Subcutaneous / IM    2. Type 2 diabetes mellitus with complication, with  long-term current use of insulin (CMS/[facility])  -     POCT glycated hemoglobin, total    3. Sciatica of left side    4. Type 2 diabetes mellitus with other specified complication, with long-term current use of insulin (CMS/[facility])    5. Lumbar pain    6. Mixed hyperlipidemia    7. Neuropathy    8. Uncontrolled type 2 diabetes mellitus with hyperglycemia (CMS/[facility])    9. BMI 29.0-29.9,adult    Discussed exam, health problems, meds, indication. Discussed plan for surgery. Discussed Std of care for DM, checking HgbA1c goal 7 or less. Discussed BMI,diet, exercise for weight loss. Discussed F/U plan.  Patient's Body mass index is 29.08 kg/m². BMI is above normal parameters. Recommendations include: educational material, exercise counseling, nutrition counseling and referral to primary care.        This document has been electronically signed by Chris Spencer MD

## 2020-12-23 PROBLEM — Z23 NEED FOR PROPHYLACTIC VACCINATION AGAINST STREPTOCOCCUS PNEUMONIAE (PNEUMOCOCCUS): Status: ACTIVE | Noted: 2020-12-23

## 2020-12-31 RX ORDER — QUETIAPINE FUMARATE 25 MG/1
TABLET, FILM COATED ORAL
Qty: 30 TABLET | Refills: 2 | Status: SHIPPED | OUTPATIENT
Start: 2020-12-31 | End: 2021-08-02

## 2021-01-26 DIAGNOSIS — F32.A DEPRESSION, UNSPECIFIED DEPRESSION TYPE: ICD-10-CM

## 2021-01-26 DIAGNOSIS — E11.42 DIABETIC POLYNEUROPATHY ASSOCIATED WITH TYPE 2 DIABETES MELLITUS (HCC): Primary | ICD-10-CM

## 2021-01-26 RX ORDER — DULOXETIN HYDROCHLORIDE 60 MG/1
CAPSULE, DELAYED RELEASE ORAL
Qty: 90 CAPSULE | Refills: 2 | Status: SHIPPED | OUTPATIENT
Start: 2021-01-26 | End: 2021-09-01 | Stop reason: SDUPTHER

## 2021-02-09 DIAGNOSIS — I10 ESSENTIAL HYPERTENSION: Primary | ICD-10-CM

## 2021-02-09 RX ORDER — LISINOPRIL 20 MG/1
TABLET ORAL
Qty: 30 TABLET | Refills: 0 | Status: SHIPPED | OUTPATIENT
Start: 2021-02-09 | End: 2021-03-11

## 2021-03-04 ENCOUNTER — TELEPHONE (OUTPATIENT)
Dept: FAMILY MEDICINE CLINIC | Facility: CLINIC | Age: 51
End: 2021-03-04

## 2021-03-04 ENCOUNTER — OFFICE VISIT (OUTPATIENT)
Dept: FAMILY MEDICINE CLINIC | Facility: CLINIC | Age: 51
End: 2021-03-04

## 2021-03-04 DIAGNOSIS — E11.65 UNCONTROLLED TYPE 2 DIABETES MELLITUS WITH HYPERGLYCEMIA (HCC): ICD-10-CM

## 2021-03-04 DIAGNOSIS — G62.9 NEUROPATHY: ICD-10-CM

## 2021-03-04 DIAGNOSIS — M51.36 DDD (DEGENERATIVE DISC DISEASE), LUMBAR: Primary | ICD-10-CM

## 2021-03-04 LAB
EXPIRATION DATE: ABNORMAL
HBA1C MFR BLD: 10.4 %
Lab: ABNORMAL

## 2021-03-04 PROCEDURE — 99214 OFFICE O/P EST MOD 30 MIN: CPT | Performed by: FAMILY MEDICINE

## 2021-03-04 PROCEDURE — 83036 HEMOGLOBIN GLYCOSYLATED A1C: CPT | Performed by: FAMILY MEDICINE

## 2021-03-04 RX ORDER — GABAPENTIN 800 MG/1
800 TABLET ORAL 4 TIMES DAILY
Qty: 120 TABLET | Refills: 3 | Status: SHIPPED | OUTPATIENT
Start: 2021-03-04 | End: 2021-07-20 | Stop reason: SDUPTHER

## 2021-03-04 NOTE — PROGRESS NOTES
Chief Complaint  Diabetes and Pain    Subjective        ' Back surgery has been delayed. Uncertain where HgbA1c will be'.  Mine Benedict presents to [facility]  Diabetes  He has type 2 diabetes mellitus. No MedicAlert identification noted. The initial diagnosis of diabetes was made 27 years ago. Hypoglycemia symptoms include sleepiness. Pertinent negatives for hypoglycemia include no dizziness, hunger, mood changes, pallor, seizures, speech difficulty or tremors. Associated symptoms include foot paresthesias, polydipsia and polyuria. Pertinent negatives for diabetes include no foot ulcerations and no polyphagia. Hypoglycemia complications include nocturnal hypoglycemia. Pertinent negatives for hypoglycemia complications include no blackouts, no hospitalization, no required assistance and no required glucagon injection. Symptoms are stable. Diabetic complications include peripheral neuropathy. Pertinent negatives for diabetic complications include no heart disease or nephropathy. Risk factors for coronary artery disease include hypertension, obesity, sedentary lifestyle, stress and tobacco exposure. Current diabetic treatment includes diet, insulin injections and oral agent (dual therapy). He is compliant with treatment all of the time. He is currently taking insulin pre-breakfast, pre-dinner and at bedtime. Insulin injections are given by patient. Rotation sites for injection include the abdominal wall and thighs. His weight is decreasing steadily. He is following a generally healthy diet. Meal planning includes carbohydrate counting. He has not had a previous visit with a dietitian. He participates in exercise weekly. He monitors blood glucose at home 1-2 x per day. He monitors urine at home <1 x per month. Blood glucose monitoring compliance is fair. His home blood glucose trend is fluctuating minimally. His dinner blood glucose range is generally 130-140 mg/dl. His overall blood  glucose range is 180-200 mg/dl. He does not see a podiatrist.Eye exam is not current.   Neurologic Problem  The patient's pertinent negatives include no syncope. Primary symptoms comment: Diabetic Neuropathy. This is a chronic problem. The current episode started more than 1 year ago. The neurological problem developed gradually. The problem has been gradually worsening since onset. Associated symptoms include back pain. Pertinent negatives include no dizziness or light-headedness. Treatments tried: Neurontin, Cymbalta. The treatment provided moderate relief. There is no history of mood changes.   Back Pain  This is a recurrent problem. The current episode started more than 1 month ago. The problem occurs constantly. The problem has been gradually worsening since onset. The pain is present in the lumbar spine. The pain radiates to the left thigh. The pain is at a severity of 6/10. The pain is severe. The pain is worse during the night. The symptoms are aggravated by position. Pertinent negatives include no dysuria. Risk factors: H/O lumbar fusion. He has tried analgesics, home exercises, heat, muscle relaxant and NSAIDs for the symptoms. The treatment provided mild relief.       Review of Systems   Endocrine: Positive for polydipsia and polyuria. Negative for polyphagia.   Genitourinary: Negative for dysuria.   Musculoskeletal: Positive for back pain.   Skin: Negative for pallor.   Neurological: Negative for dizziness, tremors, seizures, syncope, speech difficulty and light-headedness.        Neuropathy     Objective   Vital Signs:   There were no vitals taken for this visit.    Physical Exam  Nursing note reviewed.   Constitutional:       Appearance: Normal appearance.   Pulmonary:      Effort: Pulmonary effort is normal.      Breath sounds: Normal breath sounds.   Neurological:      Mental Status: He is alert.      Gait: Gait normal.   Psychiatric:         Mood and Affect: Mood normal.         Behavior: Behavior  normal.         Thought Content: Thought content normal.         Judgment: Judgment normal.        Result Review :                 Assessment and Plan    Diagnoses and all orders for this visit:    1. DDD (degenerative disc disease), lumbar (Primary)    2. Neuropathy  -     gabapentin (NEURONTIN) 800 MG tablet; Take 1 tablet by mouth 4 (Four) Times a Day.  Dispense: 120 tablet; Refill: 3    3. Uncontrolled type 2 diabetes mellitus with hyperglycemia (CMS/[facility])  -     POCT glycated hemoglobin, total        Follow Up   No follow-ups on file.  Patient was given instructions and counseling regarding his condition or for health maintenance advice. Please see specific information pulled into the AVS if appropriate.   Discussed health problems, meds, indications. POC HgbA1c 7.9-10.4 goal 7 or less. Melvin reviewed. Discussed safety with controlled med.        This document has been electronically signed by Chris Spencer MD

## 2021-03-04 NOTE — TELEPHONE ENCOUNTER
----- Message from Chris Spencer MD sent at 3/4/2021  1:11 PM CST -----  HgbA1c in high risk level, at 10.4, stress has probably played a big part, but monitor closely, take meds, let me know if there is anything we can do to help. Will recheck in 3 months

## 2021-03-11 DIAGNOSIS — I10 ESSENTIAL HYPERTENSION: ICD-10-CM

## 2021-03-11 RX ORDER — LISINOPRIL 20 MG/1
TABLET ORAL
Qty: 30 TABLET | Refills: 1 | Status: SHIPPED | OUTPATIENT
Start: 2021-03-11 | End: 2021-05-12

## 2021-03-15 ENCOUNTER — PRIOR AUTHORIZATION (OUTPATIENT)
Dept: FAMILY MEDICINE CLINIC | Facility: CLINIC | Age: 51
End: 2021-03-15

## 2021-03-15 DIAGNOSIS — E78.2 MIXED HYPERLIPIDEMIA: ICD-10-CM

## 2021-03-15 DIAGNOSIS — E11.65 UNCONTROLLED TYPE 2 DIABETES MELLITUS WITH HYPERGLYCEMIA (HCC): Primary | ICD-10-CM

## 2021-03-15 RX ORDER — SIMVASTATIN 40 MG
TABLET ORAL
Qty: 90 TABLET | Refills: 0 | Status: SHIPPED | OUTPATIENT
Start: 2021-03-15 | End: 2021-06-02 | Stop reason: SDUPTHER

## 2021-03-15 RX ORDER — EMPAGLIFLOZIN 25 MG/1
TABLET, FILM COATED ORAL
Qty: 90 TABLET | Refills: 0 | Status: SHIPPED | OUTPATIENT
Start: 2021-03-15 | End: 2021-06-02 | Stop reason: SDUPTHER

## 2021-05-12 DIAGNOSIS — I10 ESSENTIAL HYPERTENSION: ICD-10-CM

## 2021-05-12 RX ORDER — LISINOPRIL 20 MG/1
TABLET ORAL
Qty: 30 TABLET | Refills: 0 | Status: SHIPPED | OUTPATIENT
Start: 2021-05-12 | End: 2021-06-02 | Stop reason: SDUPTHER

## 2021-06-02 ENCOUNTER — OFFICE VISIT (OUTPATIENT)
Dept: FAMILY MEDICINE CLINIC | Facility: CLINIC | Age: 51
End: 2021-06-02

## 2021-06-02 VITALS
WEIGHT: 229.4 LBS | DIASTOLIC BLOOD PRESSURE: 78 MMHG | SYSTOLIC BLOOD PRESSURE: 124 MMHG | HEIGHT: 73 IN | OXYGEN SATURATION: 95 % | BODY MASS INDEX: 30.4 KG/M2 | TEMPERATURE: 98.2 F | HEART RATE: 99 BPM

## 2021-06-02 DIAGNOSIS — G89.29 CHRONIC BILATERAL LOW BACK PAIN WITH SCIATICA, SCIATICA LATERALITY UNSPECIFIED: ICD-10-CM

## 2021-06-02 DIAGNOSIS — M54.40 CHRONIC BILATERAL LOW BACK PAIN WITH SCIATICA, SCIATICA LATERALITY UNSPECIFIED: ICD-10-CM

## 2021-06-02 DIAGNOSIS — F33.0 MILD EPISODE OF RECURRENT MAJOR DEPRESSIVE DISORDER (HCC): ICD-10-CM

## 2021-06-02 DIAGNOSIS — E78.2 MIXED HYPERLIPIDEMIA: ICD-10-CM

## 2021-06-02 DIAGNOSIS — Z79.4 TYPE 2 DIABETES MELLITUS WITH COMPLICATION, WITH LONG-TERM CURRENT USE OF INSULIN (HCC): ICD-10-CM

## 2021-06-02 DIAGNOSIS — I10 ESSENTIAL HYPERTENSION: ICD-10-CM

## 2021-06-02 DIAGNOSIS — E66.9 NON MORBID OBESITY: ICD-10-CM

## 2021-06-02 DIAGNOSIS — Z12.11 SCREENING FOR COLON CANCER: ICD-10-CM

## 2021-06-02 DIAGNOSIS — E11.8 TYPE 2 DIABETES MELLITUS WITH COMPLICATION, WITH LONG-TERM CURRENT USE OF INSULIN (HCC): ICD-10-CM

## 2021-06-02 DIAGNOSIS — E11.69 HYPERLIPIDEMIA ASSOCIATED WITH TYPE 2 DIABETES MELLITUS (HCC): ICD-10-CM

## 2021-06-02 DIAGNOSIS — E11.65 UNCONTROLLED TYPE 2 DIABETES MELLITUS WITH HYPERGLYCEMIA (HCC): Primary | ICD-10-CM

## 2021-06-02 DIAGNOSIS — G47.01 INSOMNIA DUE TO MEDICAL CONDITION: ICD-10-CM

## 2021-06-02 DIAGNOSIS — E78.5 HYPERLIPIDEMIA ASSOCIATED WITH TYPE 2 DIABETES MELLITUS (HCC): ICD-10-CM

## 2021-06-02 DIAGNOSIS — E11.42 DIABETIC POLYNEUROPATHY ASSOCIATED WITH TYPE 2 DIABETES MELLITUS (HCC): ICD-10-CM

## 2021-06-02 LAB
EXPIRATION DATE: ABNORMAL
HBA1C MFR BLD: 9.3 %
Lab: ABNORMAL

## 2021-06-02 PROCEDURE — 83036 HEMOGLOBIN GLYCOSYLATED A1C: CPT | Performed by: FAMILY MEDICINE

## 2021-06-02 PROCEDURE — 99214 OFFICE O/P EST MOD 30 MIN: CPT | Performed by: FAMILY MEDICINE

## 2021-06-02 RX ORDER — EMPAGLIFLOZIN 25 MG/1
1 TABLET, FILM COATED ORAL DAILY
Qty: 90 TABLET | Refills: 0 | Status: SHIPPED | OUTPATIENT
Start: 2021-06-02 | End: 2021-06-17

## 2021-06-02 RX ORDER — INSULIN GLARGINE 100 [IU]/ML
56 INJECTION, SOLUTION SUBCUTANEOUS NIGHTLY
Qty: 18 PEN | Refills: 0 | Status: SHIPPED | OUTPATIENT
Start: 2021-06-02 | End: 2021-06-03 | Stop reason: CLARIF

## 2021-06-02 RX ORDER — INSULIN LISPRO 100 [IU]/ML
INJECTION, SOLUTION INTRAVENOUS; SUBCUTANEOUS
Qty: 15 ML | Refills: 4 | Status: SHIPPED | OUTPATIENT
Start: 2021-06-02 | End: 2021-06-03 | Stop reason: CLARIF

## 2021-06-02 RX ORDER — LISINOPRIL 20 MG/1
20 TABLET ORAL DAILY
Qty: 90 TABLET | Refills: 0 | Status: SHIPPED | OUTPATIENT
Start: 2021-06-02 | End: 2021-09-01 | Stop reason: SDUPTHER

## 2021-06-02 RX ORDER — SIMVASTATIN 40 MG
40 TABLET ORAL EVERY EVENING
Qty: 90 TABLET | Refills: 0 | Status: SHIPPED | OUTPATIENT
Start: 2021-06-02 | End: 2021-06-17

## 2021-06-02 RX ORDER — PEN NEEDLE, DIABETIC 30 GX3/16"
1 NEEDLE, DISPOSABLE MISCELLANEOUS 4 TIMES DAILY
Qty: 200 EACH | Refills: 5 | Status: SHIPPED | OUTPATIENT
Start: 2021-06-02 | End: 2021-12-02 | Stop reason: SDUPTHER

## 2021-06-02 NOTE — PROGRESS NOTES
Chief Complaint  Diabetes and Pain       Subjective          Review of Systems   Constitutional: Positive for fatigue. Negative for activity change, chills and fever.   HENT: Negative for dental problem, ear pain and postnasal drip.    Eyes: Negative for pain, discharge and visual disturbance.   Respiratory: Negative for apnea, shortness of breath and wheezing.    Cardiovascular: Negative for chest pain, palpitations and leg swelling.   Gastrointestinal: Negative for blood in stool and constipation.   Endocrine: Positive for polydipsia and polyuria. Negative for cold intolerance, heat intolerance and polyphagia.   Genitourinary: Negative for discharge and dysuria.   Musculoskeletal: Positive for back pain.        Sciatica, L side chronic       Skin: Negative for color change, pallor and wound.   Allergic/Immunologic: Negative for environmental allergies and food allergies.   Neurological: Positive for weakness. Negative for dizziness, tremors, seizures, syncope, facial asymmetry, speech difficulty, light-headedness and headaches.        Diabetic Neuropathy   Hematological: Negative.    Psychiatric/Behavioral: Positive for decreased concentration, dysphoric mood and sleep disturbance. Negative for agitation, behavioral problems, confusion, hallucinations, self-injury and suicidal ideas. The patient is not nervous/anxious.        Mine Benedict presents to [facility]  Diabetes  He has type 2 diabetes mellitus. No MedicAlert identification noted. The initial diagnosis of diabetes was made 27 years ago. Hypoglycemia symptoms include sleepiness. Pertinent negatives for hypoglycemia include no confusion, dizziness, headaches, hunger, mood changes, nervousness/anxiousness, pallor, seizures, speech difficulty or tremors. Associated symptoms include fatigue, foot paresthesias, polydipsia, polyuria and weakness. Pertinent negatives for diabetes include no chest pain, no foot ulcerations and  no polyphagia. Hypoglycemia complications include nocturnal hypoglycemia. Pertinent negatives for hypoglycemia complications include no blackouts, no hospitalization, no required assistance and no required glucagon injection. Symptoms are stable. Diabetic complications include peripheral neuropathy. Pertinent negatives for diabetic complications include no heart disease or nephropathy. Risk factors for coronary artery disease include hypertension, obesity, sedentary lifestyle, stress and tobacco exposure. Current diabetic treatment includes diet, insulin injections and oral agent (dual therapy). He is compliant with treatment all of the time. He is currently taking insulin pre-breakfast, pre-dinner and at bedtime. Insulin injections are given by patient. Rotation sites for injection include the abdominal wall and thighs. His weight is decreasing steadily. He is following a generally healthy diet. Meal planning includes carbohydrate counting. He has not had a previous visit with a dietitian. He participates in exercise weekly. He monitors blood glucose at home 1-2 x per day. He monitors urine at home <1 x per month. Blood glucose monitoring compliance is fair. His home blood glucose trend is fluctuating minimally. His dinner blood glucose range is generally 130-140 mg/dl. His overall blood glucose range is 180-200 mg/dl. He does not see a podiatrist.Eye exam is not current.   Pain  This is a chronic problem. Associated symptoms include fatigue and weakness. Pertinent negatives include no chest pain, chills, fever or headaches.   Neurologic Problem  The patient's primary symptoms include weakness. The patient's pertinent negatives include no syncope. Primary symptoms comment: Diabetic Neuropathy. This is a chronic problem. The current episode started more than 1 year ago. The neurological problem developed gradually. The problem has been gradually worsening since onset. Associated symptoms include back pain and  "fatigue. Pertinent negatives include no chest pain, confusion, dizziness, fever, headaches, light-headedness, palpitations or shortness of breath. Treatments tried: Neurontin, Cymbalta. The treatment provided moderate relief. There is no history of mood changes.   Back Pain  This is a recurrent problem. The current episode started more than 1 month ago. The problem occurs constantly. The problem has been gradually worsening since onset. The pain is present in the lumbar spine. The pain radiates to the left thigh. The pain is at a severity of 6/10. The pain is severe. The pain is worse during the night. The symptoms are aggravated by position. Associated symptoms include weakness. Pertinent negatives include no chest pain, dysuria, fever or headaches. Risk factors: H/O lumbar fusion. He has tried analgesics, home exercises, heat, muscle relaxant and NSAIDs for the symptoms. The treatment provided mild relief.       Objective   Vital Signs:   /78 (BP Location: Right arm, Patient Position: Sitting, Cuff Size: Adult)   Pulse 99   Temp 98.2 °F (36.8 °C) (Temporal)   Ht 185.4 cm (73\")   Wt 104 kg (229 lb 6.4 oz)   SpO2 95%   BMI 30.27 kg/m²     Physical Exam  Vitals and nursing note reviewed.   Constitutional:       General: He is not in acute distress.     Appearance: Normal appearance. He is obese. He is not ill-appearing or toxic-appearing.   HENT:      Head: Normocephalic.      Right Ear: Tympanic membrane, ear canal and external ear normal. There is no impacted cerumen.      Left Ear: Tympanic membrane, ear canal and external ear normal. There is no impacted cerumen.      Nose: Nose normal. No congestion or rhinorrhea.      Mouth/Throat:      Mouth: Mucous membranes are moist.      Pharynx: Oropharynx is clear. No oropharyngeal exudate or posterior oropharyngeal erythema.   Eyes:      General: No scleral icterus.        Right eye: No discharge.         Left eye: No discharge.      Pupils: Pupils are " equal, round, and reactive to light.   Neck:      Vascular: No carotid bruit.   Cardiovascular:      Rate and Rhythm: Normal rate and regular rhythm.      Heart sounds: No murmur heard.   No gallop.    Pulmonary:      Effort: Pulmonary effort is normal. No respiratory distress.      Breath sounds: Normal breath sounds. No wheezing, rhonchi or rales.   Abdominal:      General: Abdomen is flat. Bowel sounds are normal. There is no distension.      Palpations: Abdomen is soft. There is no mass.      Tenderness: There is no abdominal tenderness. There is no right CVA tenderness, left CVA tenderness, guarding or rebound.      Hernia: No hernia is present.   Musculoskeletal:         General: Tenderness present.      Cervical back: No rigidity. No muscular tenderness.   Lymphadenopathy:      Cervical: No cervical adenopathy.   Skin:     General: Skin is warm and dry.      Capillary Refill: Capillary refill takes 2 to 3 seconds.   Neurological:      Mental Status: He is alert and oriented to person, place, and time.      Cranial Nerves: No cranial nerve deficit.      Sensory: No sensory deficit.      Motor: No weakness.      Coordination: Coordination normal.      Gait: Gait normal.      Deep Tendon Reflexes: Reflexes normal.   Psychiatric:         Mood and Affect: Mood normal.         Thought Content: Thought content normal.         Judgment: Judgment normal.        Result Review :             A1C Last 3 Results    HGBA1C Last 3 Results 12/14/20 3/4/21 6/2/21   Hemoglobin A1C 7.9 10.4 9.3                     Assessment and Plan    Diagnoses and all orders for this visit:    1. Uncontrolled type 2 diabetes mellitus with hyperglycemia (CMS/[facility]) (Primary)  -     POC Glycated Hemoglobin, Total  -     Empagliflozin (Jardiance) 25 MG tablet; Take 25 mg by mouth Daily.  Dispense: 90 tablet; Refill: 0    2. Screening for colon cancer  -     Ambulatory Referral to Gastroenterology    3. Essential hypertension  -     lisinopril  (PRINIVIL,ZESTRIL) 20 MG tablet; Take 1 tablet by mouth Daily.  Dispense: 90 tablet; Refill: 0    4. Mixed hyperlipidemia  -     simvastatin (ZOCOR) 40 MG tablet; Take 1 tablet by mouth Every Evening.  Dispense: 90 tablet; Refill: 0    5. Hyperlipidemia associated with type 2 diabetes mellitus (CMS/HCC)    6. Non morbid obesity    7. Chronic bilateral low back pain with sciatica, sciatica laterality unspecified    8. Type 2 diabetes mellitus with complication, with long-term current use of insulin (CMS/[facility])  -     Insulin Pen Needle (Pen Needles) 32G X 4 MM misc; 1 each 4 (Four) Times a Day. Use to inject insulin 4 times daily  Dispense: 200 each; Refill: 5  -     Insulin Glargine (LANTUS SOLOSTAR) 100 UNIT/ML injection pen; Inject 56 Units under the skin into the appropriate area as directed Every Night.  Dispense: 18 pen; Refill: 5  -     Insulin Lispro, 1 Unit Dial, (HumaLOG KwikPen) 100 UNIT/ML solution pen-injector; Inject 20 Units under the skin into the appropriate area as directed 3 (Three) Times a Day Before Meals.  Dispense: 30 mL; Refill: 3    9. Mild episode of recurrent major depressive disorder (CMS/[facility])    10. Insomnia due to medical condition    11. Diabetic polyneuropathy associated with type 2 diabetes mellitus (CMS/HCC)    Other orders  -     Discontinue: Insulin Lispro, 1 Unit Dial, (Admelog SoloStar) 100 UNIT/ML solution pen-injector; 20-30 units  Dispense: 15 mL; Refill: 4  -     Discontinue: Insulin Glargine (BASAGLAR KWIKPEN) 100 UNIT/ML injection pen; Inject 56 Units under the skin into the appropriate area as directed Every Night.  Dispense: 18 pen; Refill: 0        Follow Up   Return in about 3 months (around 9/2/2021).  Patient was given instructions and counseling regarding his condition or for health maintenance advice. Please see specific information pulled into the AVS if appropriate.         This document has been electronically signed by Chris Spencer MD

## 2021-06-05 RX ORDER — INSULIN LISPRO 100 [IU]/ML
20 INJECTION, SOLUTION INTRAVENOUS; SUBCUTANEOUS
Qty: 30 ML | Refills: 3 | Status: SHIPPED | OUTPATIENT
Start: 2021-06-05 | End: 2021-10-18 | Stop reason: SDUPTHER

## 2021-06-17 DIAGNOSIS — E11.65 UNCONTROLLED TYPE 2 DIABETES MELLITUS WITH HYPERGLYCEMIA (HCC): ICD-10-CM

## 2021-06-17 DIAGNOSIS — E78.2 MIXED HYPERLIPIDEMIA: ICD-10-CM

## 2021-06-17 RX ORDER — SIMVASTATIN 40 MG
TABLET ORAL
Qty: 90 TABLET | Refills: 0 | Status: SHIPPED | OUTPATIENT
Start: 2021-06-17 | End: 2021-09-01 | Stop reason: SDUPTHER

## 2021-06-17 RX ORDER — EMPAGLIFLOZIN 25 MG/1
TABLET, FILM COATED ORAL
Qty: 90 TABLET | Refills: 0 | Status: SHIPPED | OUTPATIENT
Start: 2021-06-17 | End: 2021-09-01 | Stop reason: SDUPTHER

## 2021-07-20 DIAGNOSIS — G62.9 NEUROPATHY: ICD-10-CM

## 2021-07-20 RX ORDER — GABAPENTIN 800 MG/1
800 TABLET ORAL 4 TIMES DAILY
Qty: 120 TABLET | Refills: 0 | Status: SHIPPED | OUTPATIENT
Start: 2021-07-20 | End: 2021-09-01 | Stop reason: SDUPTHER

## 2021-08-02 RX ORDER — QUETIAPINE FUMARATE 25 MG/1
TABLET, FILM COATED ORAL
Qty: 90 TABLET | Refills: 0 | Status: SHIPPED | OUTPATIENT
Start: 2021-08-02 | End: 2021-10-19

## 2021-09-01 ENCOUNTER — OFFICE VISIT (OUTPATIENT)
Dept: FAMILY MEDICINE CLINIC | Facility: CLINIC | Age: 51
End: 2021-09-01

## 2021-09-01 VITALS
DIASTOLIC BLOOD PRESSURE: 78 MMHG | BODY MASS INDEX: 29.9 KG/M2 | SYSTOLIC BLOOD PRESSURE: 130 MMHG | OXYGEN SATURATION: 95 % | WEIGHT: 225.6 LBS | HEART RATE: 96 BPM | HEIGHT: 73 IN | TEMPERATURE: 97.3 F

## 2021-09-01 DIAGNOSIS — F41.9 ANXIETY: ICD-10-CM

## 2021-09-01 DIAGNOSIS — I10 ESSENTIAL HYPERTENSION: ICD-10-CM

## 2021-09-01 DIAGNOSIS — Z79.899 ENCOUNTER FOR LONG-TERM CURRENT USE OF HIGH RISK MEDICATION: ICD-10-CM

## 2021-09-01 DIAGNOSIS — E11.65 UNCONTROLLED TYPE 2 DIABETES MELLITUS WITH HYPERGLYCEMIA (HCC): ICD-10-CM

## 2021-09-01 DIAGNOSIS — E78.2 MIXED HYPERLIPIDEMIA: ICD-10-CM

## 2021-09-01 DIAGNOSIS — F32.A DEPRESSION, UNSPECIFIED DEPRESSION TYPE: ICD-10-CM

## 2021-09-01 DIAGNOSIS — E11.42 DIABETIC POLYNEUROPATHY ASSOCIATED WITH TYPE 2 DIABETES MELLITUS (HCC): ICD-10-CM

## 2021-09-01 DIAGNOSIS — G62.9 NEUROPATHY: ICD-10-CM

## 2021-09-01 LAB
EXPIRATION DATE: ABNORMAL
HBA1C MFR BLD: 10.2 %
Lab: ABNORMAL

## 2021-09-01 PROCEDURE — 99214 OFFICE O/P EST MOD 30 MIN: CPT | Performed by: FAMILY MEDICINE

## 2021-09-01 PROCEDURE — 83036 HEMOGLOBIN GLYCOSYLATED A1C: CPT | Performed by: FAMILY MEDICINE

## 2021-09-01 RX ORDER — DULOXETIN HYDROCHLORIDE 60 MG/1
60 CAPSULE, DELAYED RELEASE ORAL DAILY
Qty: 90 CAPSULE | Refills: 0 | Status: SHIPPED | OUTPATIENT
Start: 2021-09-01 | End: 2021-12-02 | Stop reason: SDUPTHER

## 2021-09-01 RX ORDER — BUSPIRONE HYDROCHLORIDE 10 MG/1
10 TABLET ORAL 3 TIMES DAILY
Qty: 90 TABLET | Refills: 1 | Status: SHIPPED | OUTPATIENT
Start: 2021-09-01 | End: 2021-10-18

## 2021-09-01 RX ORDER — GABAPENTIN 800 MG/1
800 TABLET ORAL 4 TIMES DAILY
Qty: 120 TABLET | Refills: 2 | Status: SHIPPED | OUTPATIENT
Start: 2021-09-01 | End: 2021-12-02 | Stop reason: SDUPTHER

## 2021-09-01 RX ORDER — SIMVASTATIN 40 MG
40 TABLET ORAL EVERY EVENING
Qty: 90 TABLET | Refills: 0 | Status: SHIPPED | OUTPATIENT
Start: 2021-09-01 | End: 2021-12-02 | Stop reason: SDUPTHER

## 2021-09-01 RX ORDER — LISINOPRIL 20 MG/1
20 TABLET ORAL DAILY
Qty: 90 TABLET | Refills: 0 | Status: SHIPPED | OUTPATIENT
Start: 2021-09-01 | End: 2021-12-02 | Stop reason: SDUPTHER

## 2021-09-01 NOTE — PROGRESS NOTES
"Chief Complaint  Diabetes, Back Pain, Sleeping Problem, and Anxiety   ' Haven't seen Dr. Chamorro in 6 months for back pain. Not sleeping well. Had Colonoscopy, 3 polyps removed, found one Diverticular pouch. Will have repeated in 3 years'.    Subjective          Review of Systems   Constitutional: Positive for fatigue. Negative for activity change, chills and fever.   HENT: Negative for dental problem, ear pain and postnasal drip.    Eyes: Negative for pain, discharge and visual disturbance.   Respiratory: Negative for apnea, shortness of breath and wheezing.    Cardiovascular: Negative for chest pain, palpitations and leg swelling.   Gastrointestinal: Negative for blood in stool and constipation.   Endocrine: Positive for polydipsia and polyuria. Negative for cold intolerance, heat intolerance and polyphagia.   Genitourinary: Negative for discharge and dysuria.   Musculoskeletal: Positive for back pain.        Sciatica, L side chronic       Skin: Negative for color change, pallor and wound.   Allergic/Immunologic: Negative for environmental allergies and food allergies.   Neurological: Positive for weakness. Negative for dizziness, tremors, seizures, syncope, facial asymmetry, speech difficulty, light-headedness and headaches.        Diabetic Neuropathy   Hematological: Negative.    Psychiatric/Behavioral: Positive for decreased concentration, dysphoric mood and sleep disturbance. Negative for agitation, behavioral problems, confusion, hallucinations, self-injury and suicidal ideas. The patient is not nervous/anxious.        Mine Benedict presents to [facility] [place]  History of Present Illness    Objective   Vital Signs:   /78 (BP Location: Right arm, Patient Position: Sitting, Cuff Size: Adult)   Pulse 96   Temp 97.3 °F (36.3 °C) (Temporal)   Ht 185.4 cm (73\")   Wt 102 kg (225 lb 9.6 oz)   SpO2 95%   BMI 29.76 kg/m²     Physical Exam  Vitals and " nursing note reviewed.   Constitutional:       General: He is not in acute distress.     Appearance: Normal appearance. He is obese. He is not ill-appearing or toxic-appearing.   HENT:      Head: Normocephalic.      Right Ear: Tympanic membrane, ear canal and external ear normal. There is no impacted cerumen.      Left Ear: Tympanic membrane, ear canal and external ear normal. There is no impacted cerumen.      Nose: Nose normal. No congestion or rhinorrhea.      Mouth/Throat:      Mouth: Mucous membranes are moist.      Pharynx: Oropharynx is clear. No oropharyngeal exudate or posterior oropharyngeal erythema.   Eyes:      General: No scleral icterus.        Right eye: No discharge.         Left eye: No discharge.      Pupils: Pupils are equal, round, and reactive to light.   Neck:      Vascular: No carotid bruit.   Cardiovascular:      Rate and Rhythm: Normal rate and regular rhythm.      Heart sounds: No murmur heard.   No gallop.    Pulmonary:      Effort: Pulmonary effort is normal. No respiratory distress.      Breath sounds: Normal breath sounds. No wheezing, rhonchi or rales.   Abdominal:      General: Abdomen is flat. Bowel sounds are normal. There is no distension.      Palpations: Abdomen is soft. There is no mass.      Tenderness: There is no abdominal tenderness. There is no right CVA tenderness, left CVA tenderness, guarding or rebound.      Hernia: No hernia is present.   Musculoskeletal:         General: Tenderness present.      Cervical back: No rigidity. No muscular tenderness.   Lymphadenopathy:      Cervical: No cervical adenopathy.   Skin:     General: Skin is warm and dry.      Capillary Refill: Capillary refill takes 2 to 3 seconds.   Neurological:      Mental Status: He is alert and oriented to person, place, and time.      Cranial Nerves: No cranial nerve deficit.      Sensory: No sensory deficit.      Motor: No weakness.      Coordination: Coordination normal.      Gait: Gait normal.       Deep Tendon Reflexes: Reflexes normal.   Psychiatric:         Mood and Affect: Mood normal.         Thought Content: Thought content normal.         Judgment: Judgment normal.        Result Review :                 Assessment and Plan    Diagnoses and all orders for this visit:    1. Encounter for long-term current use of high risk medication  -     Urine Drug Screen - Urine, Clean Catch    2. Uncontrolled type 2 diabetes mellitus with hyperglycemia (CMS/HCC)  -     POC Glycated Hemoglobin, Total  -     empagliflozin (Jardiance) 25 MG tablet tablet; Take 1 tablet by mouth Daily.  Dispense: 90 tablet; Refill: 0    3. Neuropathy  -     gabapentin (NEURONTIN) 800 MG tablet; Take 1 tablet by mouth 4 (Four) Times a Day.  Dispense: 120 tablet; Refill: 2    4. Diabetic polyneuropathy associated with type 2 diabetes mellitus (CMS/HCC)  -     DULoxetine (CYMBALTA) 60 MG capsule; Take 1 capsule by mouth Daily.  Dispense: 90 capsule; Refill: 0    5. Depression, unspecified depression type  -     DULoxetine (CYMBALTA) 60 MG capsule; Take 1 capsule by mouth Daily.  Dispense: 90 capsule; Refill: 0    6. Essential hypertension  -     lisinopril (PRINIVIL,ZESTRIL) 20 MG tablet; Take 1 tablet by mouth Daily.  Dispense: 90 tablet; Refill: 0    7. Mixed hyperlipidemia  -     simvastatin (ZOCOR) 40 MG tablet; Take 1 tablet by mouth Every Evening.  Dispense: 90 tablet; Refill: 0    8. Anxiety  -     busPIRone (BUSPAR) 10 MG tablet; Take 1 tablet by mouth 3 (Three) Times a Day.  Dispense: 90 tablet; Refill: 1        Follow Up   Return in about 3 months (around 12/1/2021).  Patient was given instructions and counseling regarding his condition or for health maintenance advice. Please see specific information pulled into the AVS if appropriate.         This document has been electronically signed by Chris Spencer MD

## 2021-10-18 ENCOUNTER — OFFICE VISIT (OUTPATIENT)
Dept: FAMILY MEDICINE CLINIC | Facility: CLINIC | Age: 51
End: 2021-10-18

## 2021-10-18 VITALS
DIASTOLIC BLOOD PRESSURE: 72 MMHG | HEIGHT: 73 IN | TEMPERATURE: 97.5 F | HEART RATE: 88 BPM | SYSTOLIC BLOOD PRESSURE: 120 MMHG | BODY MASS INDEX: 29.65 KG/M2 | OXYGEN SATURATION: 94 % | WEIGHT: 223.7 LBS

## 2021-10-18 DIAGNOSIS — E11.65 UNCONTROLLED TYPE 2 DIABETES MELLITUS WITH HYPERGLYCEMIA (HCC): ICD-10-CM

## 2021-10-18 DIAGNOSIS — M51.36 DDD (DEGENERATIVE DISC DISEASE), LUMBAR: ICD-10-CM

## 2021-10-18 DIAGNOSIS — Z79.4 TYPE 2 DIABETES MELLITUS WITH COMPLICATION, WITH LONG-TERM CURRENT USE OF INSULIN (HCC): ICD-10-CM

## 2021-10-18 DIAGNOSIS — R00.0 INCREASED HEART RATE: ICD-10-CM

## 2021-10-18 DIAGNOSIS — G62.9 NEUROPATHY: ICD-10-CM

## 2021-10-18 DIAGNOSIS — M54.32 SCIATICA OF LEFT SIDE: ICD-10-CM

## 2021-10-18 DIAGNOSIS — E11.8 TYPE 2 DIABETES MELLITUS WITH COMPLICATION, WITH LONG-TERM CURRENT USE OF INSULIN (HCC): ICD-10-CM

## 2021-10-18 DIAGNOSIS — Z23 NEED FOR IMMUNIZATION AGAINST INFLUENZA: ICD-10-CM

## 2021-10-18 DIAGNOSIS — F41.9 ANXIETY: ICD-10-CM

## 2021-10-18 DIAGNOSIS — L08.9 SKIN INFECTION: ICD-10-CM

## 2021-10-18 LAB
EXPIRATION DATE: ABNORMAL
HBA1C MFR BLD: 8.7 %
Lab: ABNORMAL

## 2021-10-18 PROCEDURE — 83036 HEMOGLOBIN GLYCOSYLATED A1C: CPT | Performed by: FAMILY MEDICINE

## 2021-10-18 PROCEDURE — 99214 OFFICE O/P EST MOD 30 MIN: CPT | Performed by: FAMILY MEDICINE

## 2021-10-18 PROCEDURE — 90471 IMMUNIZATION ADMIN: CPT | Performed by: FAMILY MEDICINE

## 2021-10-18 PROCEDURE — 90686 IIV4 VACC NO PRSV 0.5 ML IM: CPT | Performed by: FAMILY MEDICINE

## 2021-10-18 RX ORDER — CARVEDILOL 3.12 MG/1
3.12 TABLET ORAL 2 TIMES DAILY WITH MEALS
Qty: 60 TABLET | Refills: 3 | Status: SHIPPED | OUTPATIENT
Start: 2021-10-18 | End: 2022-02-14

## 2021-10-18 RX ORDER — BUSPIRONE HYDROCHLORIDE 15 MG/1
15 TABLET ORAL 3 TIMES DAILY
Qty: 120 TABLET | Refills: 2 | Status: SHIPPED | OUTPATIENT
Start: 2021-10-18 | End: 2021-12-02 | Stop reason: SDUPTHER

## 2021-10-18 RX ORDER — INSULIN LISPRO 100 [IU]/ML
20 INJECTION, SOLUTION INTRAVENOUS; SUBCUTANEOUS
Qty: 30 ML | Refills: 3 | Status: SHIPPED | OUTPATIENT
Start: 2021-10-18 | End: 2021-12-02 | Stop reason: SDUPTHER

## 2021-10-18 NOTE — PROGRESS NOTES
Injection  Injection performed in right deltoid by Patricia Johns MA. Patient tolerated the procedure well without complications.  10/18/21   Patricia Johns MA

## 2021-10-19 RX ORDER — QUETIAPINE FUMARATE 25 MG/1
TABLET, FILM COATED ORAL
Qty: 90 TABLET | Refills: 0 | Status: SHIPPED | OUTPATIENT
Start: 2021-10-19 | End: 2021-12-02 | Stop reason: SDUPTHER

## 2021-10-19 NOTE — PROGRESS NOTES
Chief Complaint  Heart rate issue (has been elevated, resting), Depression, Anxiety, and Grieving    Subjective          Review of Systems   Constitutional: Positive for fatigue. Negative for activity change, chills and fever.   HENT: Negative for dental problem, ear pain and postnasal drip.    Eyes: Negative for pain, discharge and visual disturbance.   Respiratory: Negative for apnea, shortness of breath and wheezing.    Cardiovascular: Negative for chest pain, palpitations and leg swelling.   Gastrointestinal: Negative for blood in stool and constipation.   Endocrine: Positive for polydipsia and polyuria. Negative for cold intolerance, heat intolerance and polyphagia.   Genitourinary: Negative for dysuria and penile discharge.   Musculoskeletal: Positive for back pain.        Sciatica, L side chronic       Skin: Negative for color change, pallor and wound.   Allergic/Immunologic: Negative for environmental allergies and food allergies.   Neurological: Positive for weakness. Negative for dizziness, tremors, seizures, syncope, facial asymmetry, speech difficulty, light-headedness and headaches.        Diabetic Neuropathy   Hematological: Negative.    Psychiatric/Behavioral: Positive for decreased concentration, dysphoric mood and sleep disturbance. Negative for agitation, behavioral problems, confusion, hallucinations, self-injury and suicidal ideas. The patient is not nervous/anxious.        Mine Benedict presents to [facility] [place]  Depression  Visit Type: follow-up  Patient presents with the following symptoms: decreased concentration.  Patient is not experiencing: confusion, nervousness/anxiety, palpitations, shortness of breath and suicidal ideas.    Anxiety  Presents for follow-up visit. Symptoms include decreased concentration. Patient reports no chest pain, confusion, dizziness, nervous/anxious behavior, palpitations, shortness of breath or suicidal  ideas. The severity of symptoms is moderate. The quality of sleep is fair.     His past medical history is significant for depression.   Diabetes  He has type 2 diabetes mellitus. No MedicAlert identification noted. The initial diagnosis of diabetes was made 27 years ago. Hypoglycemia symptoms include sleepiness. Pertinent negatives for hypoglycemia include no confusion, dizziness, headaches, hunger, mood changes, nervousness/anxiousness, pallor, seizures, speech difficulty or tremors. Associated symptoms include fatigue, foot paresthesias, polydipsia, polyuria and weakness. Pertinent negatives for diabetes include no chest pain, no foot ulcerations and no polyphagia. Hypoglycemia complications include nocturnal hypoglycemia. Pertinent negatives for hypoglycemia complications include no blackouts, no hospitalization, no required assistance and no required glucagon injection. Symptoms are stable. Diabetic complications include peripheral neuropathy. Pertinent negatives for diabetic complications include no heart disease or nephropathy. Risk factors for coronary artery disease include hypertension, obesity, sedentary lifestyle, stress and tobacco exposure. Current diabetic treatment includes diet, insulin injections and oral agent (dual therapy). He is compliant with treatment all of the time. He is currently taking insulin pre-breakfast, pre-dinner and at bedtime. Insulin injections are given by patient. Rotation sites for injection include the abdominal wall and thighs. His weight is decreasing steadily. He is following a generally healthy diet. Meal planning includes carbohydrate counting. He has not had a previous visit with a dietitian. He participates in exercise weekly. He monitors blood glucose at home 1-2 x per day. He monitors urine at home <1 x per month. Blood glucose monitoring compliance is fair. His home blood glucose trend is fluctuating minimally. His dinner blood glucose range is generally 130-140  mg/dl. His overall blood glucose range is 180-200 mg/dl. He does not see a podiatrist.Eye exam is not current.   Pain  This is a chronic problem. The current episode started more than 1 year ago. The problem occurs daily. The problem has been waxing and waning. Associated symptoms include fatigue and weakness. Pertinent negatives include no chest pain, chills, fever or headaches. He has tried NSAIDs, acetaminophen, oral narcotics, immobilization, relaxation, rest and walking (Neurontin) for the symptoms. The treatment provided mild relief.   Neurologic Problem  The patient's primary symptoms include weakness. The patient's pertinent negatives include no syncope. This is a chronic problem. The current episode started more than 1 year ago. The neurological problem developed gradually. The problem has been gradually worsening since onset. Associated symptoms include back pain and fatigue. Pertinent negatives include no chest pain, confusion, dizziness, fever, headaches, light-headedness, palpitations or shortness of breath. Treatments tried: Neurontin, Cymbalta. The treatment provided moderate relief. There is no history of mood changes.   Back Pain  This is a recurrent problem. The current episode started more than 1 month ago. The problem occurs constantly. The problem has been gradually worsening since onset. The pain is present in the lumbar spine. The pain radiates to the left thigh. The pain is at a severity of 6/10. The pain is severe. The pain is worse during the night. The symptoms are aggravated by position. Associated symptoms include weakness. Pertinent negatives include no chest pain, dysuria, fever or headaches. Risk factors: H/O lumbar fusion. He has tried analgesics, home exercises, heat, muscle relaxant and NSAIDs for the symptoms. The treatment provided mild relief.   Heart Problem  This is a chronic (Stress, increased HR) problem. The current episode started 1 to 4 weeks ago. The problem occurs  "daily. The problem has been gradually worsening. Associated symptoms include fatigue and weakness. Pertinent negatives include no chest pain, chills, fever or headaches. The symptoms are aggravated by stress (Recent death of Mother. Legal problems. ). He has tried relaxation, sleep and rest for the symptoms. The treatment provided no relief.       Objective   Vital Signs:   /72 (BP Location: Left arm, Patient Position: Sitting, Cuff Size: Adult)   Pulse 88   Temp 97.5 °F (36.4 °C) (Temporal)   Ht 185.4 cm (73\")   Wt 101 kg (223 lb 11.2 oz)   SpO2 94%   BMI 29.51 kg/m²     Physical Exam  Vitals and nursing note reviewed.   Constitutional:       General: He is not in acute distress.     Appearance: Normal appearance. He is obese. He is not ill-appearing, toxic-appearing or diaphoretic.   HENT:      Head: Normocephalic.      Right Ear: Tympanic membrane, ear canal and external ear normal. There is no impacted cerumen.      Left Ear: Tympanic membrane, ear canal and external ear normal. There is no impacted cerumen.      Nose: Nose normal. No congestion or rhinorrhea.      Mouth/Throat:      Mouth: Mucous membranes are moist.      Pharynx: Oropharynx is clear. No oropharyngeal exudate or posterior oropharyngeal erythema.   Eyes:      General: No scleral icterus.        Right eye: No discharge.         Left eye: No discharge.      Pupils: Pupils are equal, round, and reactive to light.   Neck:      Vascular: No carotid bruit.   Cardiovascular:      Rate and Rhythm: Normal rate and regular rhythm.      Heart sounds: No murmur heard.  No friction rub. No gallop.    Pulmonary:      Effort: Pulmonary effort is normal. No respiratory distress.      Breath sounds: Normal breath sounds. No stridor. No wheezing, rhonchi or rales.   Chest:      Chest wall: No tenderness.   Abdominal:      General: Abdomen is flat. Bowel sounds are normal. There is no distension.      Palpations: Abdomen is soft. There is no mass.      " Tenderness: There is no abdominal tenderness. There is no right CVA tenderness, left CVA tenderness, guarding or rebound.      Hernia: No hernia is present.   Musculoskeletal:         General: Tenderness present.      Cervical back: No rigidity or tenderness. No muscular tenderness.   Lymphadenopathy:      Cervical: No cervical adenopathy.   Skin:     General: Skin is warm and dry.      Capillary Refill: Capillary refill takes 2 to 3 seconds.      Coloration: Skin is not jaundiced or pale.      Findings: No bruising, erythema, lesion or rash.   Neurological:      Mental Status: He is alert and oriented to person, place, and time.      Cranial Nerves: No cranial nerve deficit.      Sensory: No sensory deficit.      Motor: No weakness.      Coordination: Coordination normal.      Gait: Gait normal.      Deep Tendon Reflexes: Reflexes normal.   Psychiatric:         Mood and Affect: Mood normal.         Thought Content: Thought content normal.         Judgment: Judgment normal.        Result Review :                 Assessment and Plan    Diagnoses and all orders for this visit:    1. Anxiety  -     busPIRone (BUSPAR) 15 MG tablet; Take 1 tablet by mouth 3 (Three) Times a Day. Make take additional dose daily  Dispense: 120 tablet; Refill: 2    2. Uncontrolled type 2 diabetes mellitus with hyperglycemia (HCC)  -     POC Glycated Hemoglobin, Total    3. Need for immunization against influenza  -     FluLaval/Fluarix/Fluzone >6 Months ([id number])    4. Type 2 diabetes mellitus with complication, with long-term current use of insulin (HCC)  -     Insulin Glargine (LANTUS SOLOSTAR) 100 UNIT/ML injection pen; Inject 56 Units under the skin into the appropriate area as directed Every Night.  Dispense: 18 pen; Refill: 5  -     Insulin Lispro, 1 Unit Dial, (HumaLOG KwikPen) 100 UNIT/ML solution pen-injector; Inject 20 Units under the skin into the appropriate area as directed 3 (Three) Times a Day Before Meals.  Dispense: 30 mL;  Refill: 3    5. Increased heart rate  -     carvedilol (Coreg) 3.125 MG tablet; Take 1 tablet by mouth 2 (Two) Times a Day With Meals.  Dispense: 60 tablet; Refill: 3    6. DDD (degenerative disc disease), lumbar    7. Sciatica of left side    8. Neuropathy    9. Skin infection  -     mupirocin (Bactroban) 2 % ointment; Apply  topically to the appropriate area as directed 3 (Three) Times a Day.  Dispense: 30 g; Refill: 3        Follow Up   Return in about 6 weeks (around 11/29/2021), or if symptoms worsen or fail to improve.  Patient was given instructions and counseling regarding his condition or for health maintenance advice. Please see specific information pulled into the AVS if appropriate.         This document has been electronically signed by Chris Spencer MD on October 18, 2021

## 2021-10-29 DIAGNOSIS — F41.9 ANXIETY: ICD-10-CM

## 2021-10-29 RX ORDER — BUSPIRONE HYDROCHLORIDE 10 MG/1
TABLET ORAL
Qty: 90 TABLET | Refills: 1 | OUTPATIENT
Start: 2021-10-29

## 2021-10-29 NOTE — TELEPHONE ENCOUNTER
Rx Refill Note  Requested Prescriptions     Refused Prescriptions Disp Refills   • busPIRone (BUSPAR) 10 MG tablet [Pharmacy Med Name: busPIRone HCL 10 MG TABLET] 90 tablet 1     Sig: TAKE ONE TABLET BY MOUTH THREE TIMES A DAY      Last office visit with prescribing clinician: 10/18/2021      Next office visit with prescribing clinician: 12/2/2021   {TIP  Encounters:      D/CD BY PROVIDER 10/18/21. NEW RX SENT INCREASED DOSE OF BUSPAR 15 MG WITH ADDITIONAL REFILLS         Merlyn Stanton LPN  10/29/21, 14:53 CDT

## 2021-12-02 ENCOUNTER — OFFICE VISIT (OUTPATIENT)
Dept: FAMILY MEDICINE CLINIC | Facility: CLINIC | Age: 51
End: 2021-12-02

## 2021-12-02 VITALS
WEIGHT: 232.5 LBS | DIASTOLIC BLOOD PRESSURE: 70 MMHG | HEART RATE: 97 BPM | TEMPERATURE: 97.1 F | SYSTOLIC BLOOD PRESSURE: 128 MMHG | OXYGEN SATURATION: 94 % | HEIGHT: 73 IN | BODY MASS INDEX: 30.81 KG/M2

## 2021-12-02 DIAGNOSIS — E11.8 TYPE 2 DIABETES MELLITUS WITH COMPLICATION, WITH LONG-TERM CURRENT USE OF INSULIN (HCC): ICD-10-CM

## 2021-12-02 DIAGNOSIS — F32.A DEPRESSION, UNSPECIFIED DEPRESSION TYPE: ICD-10-CM

## 2021-12-02 DIAGNOSIS — G62.9 NEUROPATHY: ICD-10-CM

## 2021-12-02 DIAGNOSIS — E78.2 MIXED HYPERLIPIDEMIA: ICD-10-CM

## 2021-12-02 DIAGNOSIS — R00.2 PALPITATIONS: ICD-10-CM

## 2021-12-02 DIAGNOSIS — E11.42 DIABETIC POLYNEUROPATHY ASSOCIATED WITH TYPE 2 DIABETES MELLITUS (HCC): ICD-10-CM

## 2021-12-02 DIAGNOSIS — Z79.4 TYPE 2 DIABETES MELLITUS WITH COMPLICATION, WITH LONG-TERM CURRENT USE OF INSULIN (HCC): ICD-10-CM

## 2021-12-02 DIAGNOSIS — I10 ESSENTIAL HYPERTENSION: ICD-10-CM

## 2021-12-02 DIAGNOSIS — Z79.899 HIGH RISK MEDICATION USE: ICD-10-CM

## 2021-12-02 DIAGNOSIS — G47.01 INSOMNIA DUE TO MEDICAL CONDITION: ICD-10-CM

## 2021-12-02 DIAGNOSIS — F41.9 ANXIETY: ICD-10-CM

## 2021-12-02 DIAGNOSIS — H00.015 HORDEOLUM EXTERNUM OF LEFT LOWER EYELID: ICD-10-CM

## 2021-12-02 DIAGNOSIS — E11.65 UNCONTROLLED TYPE 2 DIABETES MELLITUS WITH HYPERGLYCEMIA (HCC): ICD-10-CM

## 2021-12-02 DIAGNOSIS — J34.9 SINUS PROBLEM: ICD-10-CM

## 2021-12-02 PROCEDURE — 99214 OFFICE O/P EST MOD 30 MIN: CPT | Performed by: FAMILY MEDICINE

## 2021-12-02 PROCEDURE — 80306 DRUG TEST PRSMV INSTRMNT: CPT | Performed by: FAMILY MEDICINE

## 2021-12-02 PROCEDURE — 93005 ELECTROCARDIOGRAM TRACING: CPT | Performed by: FAMILY MEDICINE

## 2021-12-02 PROCEDURE — 93010 ELECTROCARDIOGRAM REPORT: CPT | Performed by: INTERNAL MEDICINE

## 2021-12-02 RX ORDER — SIMVASTATIN 40 MG
40 TABLET ORAL EVERY EVENING
Qty: 90 TABLET | Refills: 0 | Status: SHIPPED | OUTPATIENT
Start: 2021-12-02 | End: 2022-03-02 | Stop reason: SDUPTHER

## 2021-12-02 RX ORDER — PEN NEEDLE, DIABETIC 30 GX3/16"
1 NEEDLE, DISPOSABLE MISCELLANEOUS 4 TIMES DAILY
Qty: 200 EACH | Refills: 5 | Status: SHIPPED | OUTPATIENT
Start: 2021-12-02 | End: 2022-06-13 | Stop reason: SDUPTHER

## 2021-12-02 RX ORDER — DULOXETIN HYDROCHLORIDE 60 MG/1
60 CAPSULE, DELAYED RELEASE ORAL DAILY
Qty: 90 CAPSULE | Refills: 0 | Status: SHIPPED | OUTPATIENT
Start: 2021-12-02 | End: 2022-03-02 | Stop reason: SDUPTHER

## 2021-12-02 RX ORDER — AZITHROMYCIN 250 MG/1
TABLET, FILM COATED ORAL
Qty: 6 TABLET | Refills: 0 | Status: SHIPPED | OUTPATIENT
Start: 2021-12-02 | End: 2022-03-02

## 2021-12-02 RX ORDER — GABAPENTIN 800 MG/1
800 TABLET ORAL 4 TIMES DAILY
Qty: 120 TABLET | Refills: 2 | Status: SHIPPED | OUTPATIENT
Start: 2021-12-02 | End: 2022-03-02 | Stop reason: SDUPTHER

## 2021-12-02 RX ORDER — QUETIAPINE FUMARATE 25 MG/1
25 TABLET, FILM COATED ORAL NIGHTLY
Qty: 90 TABLET | Refills: 0 | Status: SHIPPED | OUTPATIENT
Start: 2021-12-02 | End: 2022-03-02 | Stop reason: SDUPTHER

## 2021-12-02 RX ORDER — INSULIN LISPRO 100 [IU]/ML
20 INJECTION, SOLUTION INTRAVENOUS; SUBCUTANEOUS
Qty: 30 ML | Refills: 3 | Status: SHIPPED | OUTPATIENT
Start: 2021-12-02 | End: 2022-03-02 | Stop reason: SDUPTHER

## 2021-12-02 RX ORDER — BUSPIRONE HYDROCHLORIDE 15 MG/1
15 TABLET ORAL 3 TIMES DAILY
Qty: 120 TABLET | Refills: 2 | Status: SHIPPED | OUTPATIENT
Start: 2021-12-02 | End: 2022-03-02 | Stop reason: SDUPTHER

## 2021-12-02 RX ORDER — LISINOPRIL 20 MG/1
20 TABLET ORAL DAILY
Qty: 90 TABLET | Refills: 0 | Status: SHIPPED | OUTPATIENT
Start: 2021-12-02 | End: 2021-12-13

## 2021-12-02 NOTE — PROGRESS NOTES
Chief Complaint  Diabetes, Hypertension, Eye Problem (left), and Pain   ' Stye L lower eyelid. Trying to watch diet for diabetes. Concerned for occasional palpitations, have recordings from watch'.    Subjective          Review of Systems   Constitutional: Positive for fatigue. Negative for activity change, chills and fever.   HENT: Negative for dental problem, ear pain and postnasal drip.    Eyes: Negative for pain, discharge and visual disturbance.   Respiratory: Negative for apnea, shortness of breath and wheezing.    Cardiovascular: Negative for chest pain, palpitations and leg swelling.   Gastrointestinal: Negative for blood in stool and constipation.   Endocrine: Positive for polydipsia and polyuria. Negative for cold intolerance, heat intolerance and polyphagia.   Genitourinary: Negative for dysuria and penile discharge.   Musculoskeletal: Positive for back pain.        Sciatica, L side chronic       Skin: Negative for color change, pallor and wound.   Allergic/Immunologic: Negative for environmental allergies and food allergies.   Neurological: Positive for weakness. Negative for dizziness, tremors, seizures, syncope, facial asymmetry, speech difficulty, light-headedness and headaches.        Diabetic Neuropathy   Hematological: Negative.    Psychiatric/Behavioral: Positive for decreased concentration, dysphoric mood and sleep disturbance. Negative for agitation, behavioral problems, confusion, hallucinations, self-injury and suicidal ideas. The patient is not nervous/anxious.        Mine Benedict presents to [facility] - [place]  Depression  Visit Type: follow-up  Patient presents with the following symptoms: decreased concentration.  Patient is not experiencing: confusion, nervousness/anxiety, palpitations, shortness of breath and suicidal ideas.    Anxiety  Presents for follow-up visit. Symptoms include decreased concentration. Patient reports no chest  pain, confusion, dizziness, nervous/anxious behavior, palpitations, shortness of breath or suicidal ideas. The severity of symptoms is moderate. The quality of sleep is fair.     His past medical history is significant for depression.   Diabetes  He has type 2 diabetes mellitus. No MedicAlert identification noted. The initial diagnosis of diabetes was made 27 years ago. Hypoglycemia symptoms include sleepiness. Pertinent negatives for hypoglycemia include no confusion, dizziness, headaches, hunger, mood changes, nervousness/anxiousness, pallor, seizures, speech difficulty or tremors. Associated symptoms include fatigue, foot paresthesias, polydipsia, polyuria and weakness. Pertinent negatives for diabetes include no chest pain, no foot ulcerations and no polyphagia. Hypoglycemia complications include nocturnal hypoglycemia. Pertinent negatives for hypoglycemia complications include no blackouts, no hospitalization, no required assistance and no required glucagon injection. Symptoms are stable. Diabetic complications include peripheral neuropathy. Pertinent negatives for diabetic complications include no heart disease or nephropathy. Risk factors for coronary artery disease include hypertension, obesity, sedentary lifestyle, stress and tobacco exposure. Current diabetic treatment includes diet, insulin injections and oral agent (dual therapy). He is compliant with treatment all of the time. He is currently taking insulin pre-breakfast, pre-dinner and at bedtime. Insulin injections are given by patient. Rotation sites for injection include the abdominal wall and thighs. His weight is decreasing steadily. He is following a generally healthy diet. Meal planning includes carbohydrate counting. He has not had a previous visit with a dietitian. He participates in exercise weekly. He monitors blood glucose at home 1-2 x per day. He monitors urine at home <1 x per month. Blood glucose monitoring compliance is fair. His home  blood glucose trend is fluctuating minimally. His dinner blood glucose range is generally 130-140 mg/dl. His overall blood glucose range is 180-200 mg/dl. He does not see a podiatrist.Eye exam is not current.   Pain  This is a chronic problem. The current episode started more than 1 year ago. The problem occurs daily. The problem has been waxing and waning. Associated symptoms include fatigue and weakness. Pertinent negatives include no chest pain, chills, fever or headaches. He has tried NSAIDs, acetaminophen, oral narcotics, immobilization, relaxation, rest and walking (Neurontin) for the symptoms. The treatment provided mild relief.   Neurologic Problem  The patient's primary symptoms include weakness. The patient's pertinent negatives include no syncope. This is a chronic problem. The current episode started more than 1 year ago. The neurological problem developed gradually. The problem has been gradually improving since onset. Associated symptoms include back pain and fatigue. Pertinent negatives include no chest pain, confusion, dizziness, fever, headaches, light-headedness, palpitations or shortness of breath. Treatments tried: Neurontin, Cymbalta. The treatment provided moderate relief. There is no history of mood changes.   Back Pain  This is a recurrent problem. The current episode started more than 1 month ago. The problem occurs constantly. The problem has been gradually worsening since onset. The pain is present in the lumbar spine. The pain radiates to the left thigh. The pain is at a severity of 6/10. The pain is severe. The pain is worse during the night. The symptoms are aggravated by position. Associated symptoms include weakness. Pertinent negatives include no chest pain, dysuria, fever or headaches. Risk factors: H/O lumbar fusion. He has tried analgesics, home exercises, heat, muscle relaxant and NSAIDs for the symptoms. The treatment provided mild relief.   Heart Problem  This is a chronic  "(Stress, increased HR) problem. The current episode started more than 1 month ago. The problem occurs daily. The problem has been gradually improving (HR improved with BB). Associated symptoms include fatigue and weakness. Pertinent negatives include no chest pain, chills, fever or headaches. The symptoms are aggravated by stress (Recent death of Mother. Legal problems. ). He has tried relaxation, sleep and rest for the symptoms. The treatment provided moderate relief.       Objective   Vital Signs:   /70 (BP Location: Right arm, Patient Position: Sitting, Cuff Size: Adult)   Pulse 97   Temp 97.1 °F (36.2 °C) (Temporal)   Ht 185.4 cm (73\")   Wt 105 kg (232 lb 8 oz)   SpO2 94%   BMI 30.67 kg/m²     Physical Exam  Vitals and nursing note reviewed.   Constitutional:       General: He is not in acute distress.     Appearance: Normal appearance. He is obese. He is not ill-appearing, toxic-appearing or diaphoretic.   HENT:      Head: Normocephalic.      Right Ear: Tympanic membrane, ear canal and external ear normal. There is no impacted cerumen.      Left Ear: Tympanic membrane, ear canal and external ear normal. There is no impacted cerumen.      Nose: Nose normal. No congestion or rhinorrhea.      Mouth/Throat:      Mouth: Mucous membranes are moist.      Pharynx: Oropharynx is clear. No oropharyngeal exudate or posterior oropharyngeal erythema.   Eyes:      General: No scleral icterus.        Right eye: No discharge.         Left eye: No discharge.      Pupils: Pupils are equal, round, and reactive to light.   Neck:      Vascular: No carotid bruit.   Cardiovascular:      Rate and Rhythm: Normal rate and regular rhythm.      Heart sounds: No murmur heard.  No friction rub. No gallop.    Pulmonary:      Effort: Pulmonary effort is normal. No respiratory distress.      Breath sounds: Normal breath sounds. No stridor. No wheezing, rhonchi or rales.   Chest:      Chest wall: No tenderness.   Abdominal:      " General: Abdomen is flat. Bowel sounds are normal. There is no distension.      Palpations: Abdomen is soft. There is no mass.      Tenderness: There is no abdominal tenderness. There is no right CVA tenderness, left CVA tenderness, guarding or rebound.      Hernia: No hernia is present.   Musculoskeletal:         General: Tenderness present.      Cervical back: No rigidity or tenderness. No muscular tenderness.   Lymphadenopathy:      Cervical: No cervical adenopathy.   Skin:     General: Skin is warm and dry.      Capillary Refill: Capillary refill takes 2 to 3 seconds.      Coloration: Skin is not jaundiced or pale.      Findings: No bruising, erythema, lesion or rash.   Neurological:      Mental Status: He is alert and oriented to person, place, and time.      Cranial Nerves: No cranial nerve deficit.      Sensory: No sensory deficit.      Motor: No weakness.      Coordination: Coordination normal.      Gait: Gait normal.      Deep Tendon Reflexes: Reflexes normal.   Psychiatric:         Mood and Affect: Mood normal.         Thought Content: Thought content normal.         Judgment: Judgment normal.        Result Review :                 Assessment and Plan    Diagnoses and all orders for this visit:    1. Neuropathy  -     gabapentin (NEURONTIN) 800 MG tablet; Take 1 tablet by mouth 4 (Four) Times a Day.  Dispense: 120 tablet; Refill: 2    2. High risk medication use  -     Cancel: Urine Drug Screen - Urine, Clean Catch; Future  -     Urine Drug Screen - Urine, Clean Catch    3. Palpitations  -     ECG 12 Lead    4. Hordeolum externum of left lower eyelid    5. Anxiety  -     busPIRone (BUSPAR) 15 MG tablet; Take 1 tablet by mouth 3 (Three) Times a Day. Make take additional dose daily  Dispense: 120 tablet; Refill: 2    6. Diabetic polyneuropathy associated with type 2 diabetes mellitus (HCC)  -     DULoxetine (CYMBALTA) 60 MG capsule; Take 1 capsule by mouth Daily.  Dispense: 90 capsule; Refill: 0    7.  Depression, unspecified depression type  -     DULoxetine (CYMBALTA) 60 MG capsule; Take 1 capsule by mouth Daily.  Dispense: 90 capsule; Refill: 0    8. Uncontrolled type 2 diabetes mellitus with hyperglycemia (HCC)  -     empagliflozin (Jardiance) 25 MG tablet tablet; Take 1 tablet by mouth Daily.  Dispense: 90 tablet; Refill: 0    9. Type 2 diabetes mellitus with complication, with long-term current use of insulin (HCC)  -     Insulin Lispro, 1 Unit Dial, (HumaLOG KwikPen) 100 UNIT/ML solution pen-injector; Inject 20 Units under the skin into the appropriate area as directed 3 (Three) Times a Day Before Meals.  Dispense: 30 mL; Refill: 3  -     Insulin Pen Needle (Pen Needles) 32G X 4 MM misc; 1 each 4 (Four) Times a Day. Use to inject insulin 4 times daily  Dispense: 200 each; Refill: 5    10. Essential hypertension  -     lisinopril (PRINIVIL,ZESTRIL) 20 MG tablet; Take 1 tablet by mouth Daily.  Dispense: 90 tablet; Refill: 0    11. Mixed hyperlipidemia  -     simvastatin (ZOCOR) 40 MG tablet; Take 1 tablet by mouth Every Evening.  Dispense: 90 tablet; Refill: 0    12. Sinus problem  -     azithromycin (Zithromax) 250 MG tablet; Take 2 tablets the first day, then 1 tablet daily for 4 days.  Dispense: 6 tablet; Refill: 0    13. Insomnia due to medical condition  -     QUEtiapine (SEROquel) 25 MG tablet; Take 1 tablet by mouth Every Night.  Dispense: 90 tablet; Refill: 0        Follow Up   Return in about 3 months (around 3/2/2022).  Patient was given instructions and counseling regarding his condition or for health maintenance advice. Please see specific information pulled into the AVS if appropriate.         This document has been electronically signed by Chris Spencer MD

## 2021-12-03 LAB
AMPHET+METHAMPHET UR QL: NEGATIVE
AMPHETAMINES UR QL: NEGATIVE
BARBITURATES UR QL SCN: NEGATIVE
BENZODIAZ UR QL SCN: NEGATIVE
BUPRENORPHINE SERPL-MCNC: NEGATIVE NG/ML
CANNABINOIDS SERPL QL: NEGATIVE
COCAINE UR QL: NEGATIVE
METHADONE UR QL SCN: NEGATIVE
OPIATES UR QL: NEGATIVE
OXYCODONE UR QL SCN: NEGATIVE
PCP UR QL SCN: NEGATIVE
PROPOXYPH UR QL: NEGATIVE
TRICYCLICS UR QL SCN: NEGATIVE

## 2021-12-10 LAB
QT INTERVAL: 368 MS
QTC INTERVAL: 445 MS

## 2021-12-11 DIAGNOSIS — I10 ESSENTIAL HYPERTENSION: ICD-10-CM

## 2021-12-13 RX ORDER — LISINOPRIL 20 MG/1
TABLET ORAL
Qty: 90 TABLET | Refills: 0 | Status: SHIPPED | OUTPATIENT
Start: 2021-12-13 | End: 2022-03-02 | Stop reason: SDUPTHER

## 2021-12-13 NOTE — TELEPHONE ENCOUNTER
Rx Refill Note  Requested Prescriptions     Pending Prescriptions Disp Refills   • lisinopril (PRINIVIL,ZESTRIL) 20 MG tablet [Pharmacy Med Name: LISINOPRIL 20 MG TABLET] 90 tablet 0     Sig: TAKE ONE TABLET BY MOUTH DAILY      Last office visit with prescribing clinician: 12/2/2021      Next office visit with prescribing clinician: 3/2/2022   {TIP  Encounters:       Rx #: [id number]    ACE Inhibitors Protocol Failed 12/11/2021 05:45 AM   Protocol Details  Normal serum potassium in past 12 months          {TIP  Please add Last Relevant Lab Date if appropriate  {TIP  Is Refill Pharmacy correct? YES  Merlyn Stanton LPN  12/13/21, 09:10 CST

## 2022-01-28 ENCOUNTER — PRIOR AUTHORIZATION (OUTPATIENT)
Dept: FAMILY MEDICINE CLINIC | Facility: CLINIC | Age: 52
End: 2022-01-28

## 2022-02-14 DIAGNOSIS — R00.0 INCREASED HEART RATE: ICD-10-CM

## 2022-02-14 RX ORDER — CARVEDILOL 3.12 MG/1
TABLET ORAL
Qty: 60 TABLET | Refills: 0 | Status: SHIPPED | OUTPATIENT
Start: 2022-02-14 | End: 2022-03-02 | Stop reason: SDUPTHER

## 2022-03-02 ENCOUNTER — OFFICE VISIT (OUTPATIENT)
Dept: FAMILY MEDICINE CLINIC | Facility: CLINIC | Age: 52
End: 2022-03-02

## 2022-03-02 VITALS
HEART RATE: 97 BPM | WEIGHT: 229 LBS | HEIGHT: 73 IN | DIASTOLIC BLOOD PRESSURE: 72 MMHG | BODY MASS INDEX: 30.35 KG/M2 | SYSTOLIC BLOOD PRESSURE: 120 MMHG | TEMPERATURE: 97.8 F | OXYGEN SATURATION: 97 %

## 2022-03-02 DIAGNOSIS — E11.65 UNCONTROLLED TYPE 2 DIABETES MELLITUS WITH HYPERGLYCEMIA: ICD-10-CM

## 2022-03-02 DIAGNOSIS — F32.A DEPRESSION, UNSPECIFIED DEPRESSION TYPE: ICD-10-CM

## 2022-03-02 DIAGNOSIS — G62.9 NEUROPATHY: ICD-10-CM

## 2022-03-02 DIAGNOSIS — E11.42 DIABETIC POLYNEUROPATHY ASSOCIATED WITH TYPE 2 DIABETES MELLITUS: ICD-10-CM

## 2022-03-02 DIAGNOSIS — F41.9 ANXIETY: ICD-10-CM

## 2022-03-02 DIAGNOSIS — E78.2 MIXED HYPERLIPIDEMIA: ICD-10-CM

## 2022-03-02 DIAGNOSIS — I10 ESSENTIAL HYPERTENSION: ICD-10-CM

## 2022-03-02 DIAGNOSIS — Z79.4 TYPE 2 DIABETES MELLITUS WITH COMPLICATION, WITH LONG-TERM CURRENT USE OF INSULIN: ICD-10-CM

## 2022-03-02 DIAGNOSIS — G47.01 INSOMNIA DUE TO MEDICAL CONDITION: ICD-10-CM

## 2022-03-02 DIAGNOSIS — E11.8 TYPE 2 DIABETES MELLITUS WITH COMPLICATION, WITH LONG-TERM CURRENT USE OF INSULIN: ICD-10-CM

## 2022-03-02 DIAGNOSIS — R00.0 INCREASED HEART RATE: ICD-10-CM

## 2022-03-02 LAB
EXPIRATION DATE: ABNORMAL
HBA1C MFR BLD: 10.6 %
Lab: ABNORMAL

## 2022-03-02 PROCEDURE — 83036 HEMOGLOBIN GLYCOSYLATED A1C: CPT | Performed by: FAMILY MEDICINE

## 2022-03-02 PROCEDURE — 99214 OFFICE O/P EST MOD 30 MIN: CPT | Performed by: FAMILY MEDICINE

## 2022-03-02 RX ORDER — DULOXETIN HYDROCHLORIDE 60 MG/1
60 CAPSULE, DELAYED RELEASE ORAL DAILY
Qty: 90 CAPSULE | Refills: 1 | Status: SHIPPED | OUTPATIENT
Start: 2022-03-02 | End: 2022-06-13

## 2022-03-02 RX ORDER — LISINOPRIL 20 MG/1
20 TABLET ORAL DAILY
Qty: 90 TABLET | Refills: 1 | Status: SHIPPED | OUTPATIENT
Start: 2022-03-02 | End: 2022-07-11

## 2022-03-02 RX ORDER — QUETIAPINE FUMARATE 25 MG/1
25 TABLET, FILM COATED ORAL NIGHTLY
Qty: 90 TABLET | Refills: 1 | Status: SHIPPED | OUTPATIENT
Start: 2022-03-02 | End: 2022-06-13 | Stop reason: SDUPTHER

## 2022-03-02 RX ORDER — BUSPIRONE HYDROCHLORIDE 15 MG/1
15 TABLET ORAL 3 TIMES DAILY
Qty: 120 TABLET | Refills: 2 | Status: SHIPPED | OUTPATIENT
Start: 2022-03-02 | End: 2022-04-15

## 2022-03-02 RX ORDER — SIMVASTATIN 40 MG
40 TABLET ORAL EVERY EVENING
Qty: 90 TABLET | Refills: 1 | Status: SHIPPED | OUTPATIENT
Start: 2022-03-02 | End: 2022-09-13 | Stop reason: SDUPTHER

## 2022-03-02 RX ORDER — CARVEDILOL 3.12 MG/1
3.12 TABLET ORAL 2 TIMES DAILY WITH MEALS
Qty: 180 TABLET | Refills: 1 | Status: SHIPPED | OUTPATIENT
Start: 2022-03-02 | End: 2022-07-19

## 2022-03-02 RX ORDER — INSULIN LISPRO 100 [IU]/ML
20 INJECTION, SOLUTION INTRAVENOUS; SUBCUTANEOUS
Qty: 30 ML | Refills: 3 | Status: SHIPPED | OUTPATIENT
Start: 2022-03-02 | End: 2022-06-13 | Stop reason: SDUPTHER

## 2022-03-02 RX ORDER — GABAPENTIN 800 MG/1
800 TABLET ORAL 4 TIMES DAILY
Qty: 120 TABLET | Refills: 2 | Status: SHIPPED | OUTPATIENT
Start: 2022-03-02 | End: 2022-06-13 | Stop reason: SDUPTHER

## 2022-03-02 NOTE — PROGRESS NOTES
Chief Complaint  Diabetes, Hypertension, and Pain    Subjective          Review of Systems   Constitutional: Positive for fatigue. Negative for activity change, chills and fever.   HENT: Negative for dental problem, ear pain and postnasal drip.    Eyes: Negative for pain, discharge and visual disturbance.   Respiratory: Negative for apnea, shortness of breath and wheezing.    Cardiovascular: Negative for chest pain, palpitations and leg swelling.   Gastrointestinal: Negative for blood in stool and constipation.   Endocrine: Positive for polydipsia and polyuria. Negative for cold intolerance, heat intolerance and polyphagia.   Genitourinary: Negative for dysuria and penile discharge.   Musculoskeletal: Positive for back pain.        Sciatica, L side chronic       Skin: Negative for color change, pallor and wound.   Allergic/Immunologic: Negative for environmental allergies and food allergies.   Neurological: Positive for weakness. Negative for dizziness, tremors, seizures, syncope, facial asymmetry, speech difficulty, light-headedness and headaches.        Diabetic Neuropathy   Hematological: Negative.    Psychiatric/Behavioral: Positive for decreased concentration, dysphoric mood and sleep disturbance. Negative for agitation, behavioral problems, confusion, hallucinations, self-injury and suicidal ideas. The patient is not nervous/anxious.        Mine Benedict presents to [facility] [place]  Diabetes  He has type 2 diabetes mellitus. No MedicAlert identification noted. The initial diagnosis of diabetes was made 27 years ago. Hypoglycemia symptoms include sleepiness. Pertinent negatives for hypoglycemia include no confusion, dizziness, headaches, hunger, mood changes, nervousness/anxiousness, pallor, seizures, speech difficulty or tremors. Associated symptoms include fatigue, foot paresthesias, polydipsia, polyuria and weakness. Pertinent negatives for diabetes  include no chest pain, no foot ulcerations and no polyphagia. Hypoglycemia complications include nocturnal hypoglycemia. Pertinent negatives for hypoglycemia complications include no blackouts, no hospitalization, no required assistance and no required glucagon injection. Symptoms are stable. Diabetic complications include peripheral neuropathy. Pertinent negatives for diabetic complications include no heart disease or nephropathy. Risk factors for coronary artery disease include hypertension, obesity, sedentary lifestyle, stress and tobacco exposure. Current diabetic treatment includes diet, insulin injections and oral agent (dual therapy). He is compliant with treatment all of the time. He is currently taking insulin pre-breakfast, pre-dinner and at bedtime. Insulin injections are given by patient. Rotation sites for injection include the abdominal wall and thighs. His weight is decreasing steadily. He is following a generally healthy diet. Meal planning includes carbohydrate counting. He has not had a previous visit with a dietitian. He participates in exercise weekly. He monitors blood glucose at home 1-2 x per day. He monitors urine at home <1 x per month. Blood glucose monitoring compliance is fair. His home blood glucose trend is fluctuating minimally. His dinner blood glucose range is generally 130-140 mg/dl. His overall blood glucose range is 180-200 mg/dl. He does not see a podiatrist.Eye exam is not current.   Hypertension  This is a chronic problem. The current episode started more than 1 year ago. The problem has been waxing and waning since onset. The problem is controlled. Associated symptoms include anxiety. Pertinent negatives include no chest pain, headaches, palpitations or shortness of breath. Past treatments include ACE inhibitors and beta blockers. Current antihypertension treatment includes beta blockers and ACE inhibitors. The current treatment provides significant improvement.   Pain  This  is a chronic problem. The current episode started more than 1 year ago. The problem occurs daily. The problem has been waxing and waning. Associated symptoms include fatigue and weakness. Pertinent negatives include no chest pain, chills, fever or headaches. He has tried NSAIDs, acetaminophen, oral narcotics, immobilization, relaxation, rest and walking (Neurontin) for the symptoms. The treatment provided mild relief.   Depression  Visit Type: follow-up  Patient presents with the following symptoms: decreased concentration.  Patient is not experiencing: confusion, nervousness/anxiety, palpitations, shortness of breath and suicidal ideas.    Anxiety  Presents for follow-up visit. Symptoms include decreased concentration. Patient reports no chest pain, confusion, dizziness, nervous/anxious behavior, palpitations, shortness of breath or suicidal ideas. The severity of symptoms is moderate. The quality of sleep is fair.     His past medical history is significant for depression.   Neurologic Problem  The patient's primary symptoms include weakness. The patient's pertinent negatives include no syncope. This is a chronic problem. The current episode started more than 1 year ago. The neurological problem developed gradually. The problem has been gradually improving since onset. Associated symptoms include back pain and fatigue. Pertinent negatives include no chest pain, confusion, dizziness, fever, headaches, light-headedness, palpitations or shortness of breath. Treatments tried: Neurontin, Cymbalta. The treatment provided moderate relief. There is no history of mood changes.   Back Pain  This is a recurrent problem. The current episode started more than 1 month ago. The problem occurs constantly. The problem has been gradually worsening since onset. The pain is present in the lumbar spine. The pain radiates to the left thigh. The pain is at a severity of 6/10. The pain is severe. The pain is worse during the night. The  "symptoms are aggravated by position. Associated symptoms include weakness. Pertinent negatives include no chest pain, dysuria, fever or headaches. Risk factors: H/O lumbar fusion. He has tried analgesics, home exercises, heat, muscle relaxant and NSAIDs for the symptoms. The treatment provided mild relief.   Heart Problem  This is a chronic (Stress, increased HR) problem. The current episode started more than 1 month ago. The problem occurs daily. The problem has been gradually improving (HR improved with BB). Associated symptoms include fatigue and weakness. Pertinent negatives include no chest pain, chills, fever or headaches. The symptoms are aggravated by stress (Recent death of Mother. Legal problems. ). He has tried relaxation, sleep and rest for the symptoms. The treatment provided moderate relief.       Objective   Vital Signs:   /72 (BP Location: Right arm, Patient Position: Sitting, Cuff Size: Adult)   Pulse 97   Temp 97.8 °F (36.6 °C) (Temporal)   Ht 185.4 cm (73\")   Wt 104 kg (229 lb)   SpO2 97%   BMI 30.21 kg/m²     Physical Exam  Vitals and nursing note reviewed.   Constitutional:       General: He is not in acute distress.     Appearance: Normal appearance. He is obese. He is not ill-appearing, toxic-appearing or diaphoretic.   HENT:      Head: Normocephalic.      Right Ear: Tympanic membrane, ear canal and external ear normal. There is no impacted cerumen.      Left Ear: Tympanic membrane, ear canal and external ear normal. There is no impacted cerumen.      Nose: Nose normal. No congestion or rhinorrhea.      Mouth/Throat:      Mouth: Mucous membranes are moist.      Pharynx: Oropharynx is clear. No oropharyngeal exudate or posterior oropharyngeal erythema.   Eyes:      General: No scleral icterus.        Right eye: No discharge.         Left eye: No discharge.      Pupils: Pupils are equal, round, and reactive to light.   Neck:      Vascular: No carotid bruit.   Cardiovascular:      " Rate and Rhythm: Normal rate and regular rhythm.      Heart sounds: No murmur heard.  No friction rub. No gallop.    Pulmonary:      Effort: Pulmonary effort is normal. No respiratory distress.      Breath sounds: Normal breath sounds. No stridor. No wheezing, rhonchi or rales.   Chest:      Chest wall: No tenderness.   Abdominal:      General: Abdomen is flat. Bowel sounds are normal. There is no distension.      Palpations: Abdomen is soft. There is no mass.      Tenderness: There is no abdominal tenderness. There is no right CVA tenderness, left CVA tenderness, guarding or rebound.      Hernia: No hernia is present.   Musculoskeletal:         General: Tenderness present.      Cervical back: No rigidity or tenderness. No muscular tenderness.   Lymphadenopathy:      Cervical: No cervical adenopathy.   Skin:     General: Skin is warm and dry.      Capillary Refill: Capillary refill takes 2 to 3 seconds.      Coloration: Skin is not jaundiced or pale.      Findings: No bruising, erythema, lesion or rash.   Neurological:      Mental Status: He is alert and oriented to person, place, and time.      Cranial Nerves: No cranial nerve deficit.      Sensory: No sensory deficit.      Motor: No weakness.      Coordination: Coordination normal.      Gait: Gait normal.      Deep Tendon Reflexes: Reflexes normal.   Psychiatric:         Mood and Affect: Mood normal.         Thought Content: Thought content normal.         Judgment: Judgment normal.        Result Review :                 Assessment and Plan    Diagnoses and all orders for this visit:    1. Uncontrolled type 2 diabetes mellitus with hyperglycemia (HCC)  -     POC Glycated Hemoglobin, Total  -     empagliflozin (Jardiance) 25 MG tablet tablet; Take 1 tablet by mouth Daily.  Dispense: 90 tablet; Refill: 1    2. Neuropathy  -     gabapentin (NEURONTIN) 800 MG tablet; Take 1 tablet by mouth 4 (Four) Times a Day.  Dispense: 120 tablet; Refill: 2    3. Anxiety  -      busPIRone (BUSPAR) 15 MG tablet; Take 1 tablet by mouth 3 (Three) Times a Day. Make take additional dose daily  Dispense: 120 tablet; Refill: 2    4. Increased heart rate  -     carvedilol (COREG) 3.125 MG tablet; Take 1 tablet by mouth 2 (Two) Times a Day With Meals.  Dispense: 180 tablet; Refill: 1    5. Diabetic polyneuropathy associated with type 2 diabetes mellitus (HCC)  -     DULoxetine (CYMBALTA) 60 MG capsule; Take 1 capsule by mouth Daily.  Dispense: 90 capsule; Refill: 1    6. Depression, unspecified depression type  -     DULoxetine (CYMBALTA) 60 MG capsule; Take 1 capsule by mouth Daily.  Dispense: 90 capsule; Refill: 1    7. Type 2 diabetes mellitus with complication, with long-term current use of insulin (HCC)  -     Dulaglutide 0.75 MG/0.5ML solution pen-injector; Inject 0.75 mg under the skin into the appropriate area as directed 1 (One) Time Per Week.  Dispense: 4 pen; Refill: 0  -     Insulin Glargine (LANTUS SOLOSTAR) 100 UNIT/ML injection pen; Inject 56 Units under the skin into the appropriate area as directed Every Night.  Dispense: 18 pen; Refill: 5  -     Insulin Lispro, 1 Unit Dial, (HumaLOG KwikPen) 100 UNIT/ML solution pen-injector; Inject 20 Units under the skin into the appropriate area as directed 3 (Three) Times a Day Before Meals.  Dispense: 30 mL; Refill: 3    8. Essential hypertension  -     lisinopril (PRINIVIL,ZESTRIL) 20 MG tablet; Take 1 tablet by mouth Daily.  Dispense: 90 tablet; Refill: 1    9. Insomnia due to medical condition  -     QUEtiapine (SEROquel) 25 MG tablet; Take 1 tablet by mouth Every Night.  Dispense: 90 tablet; Refill: 1    10. Mixed hyperlipidemia  -     simvastatin (ZOCOR) 40 MG tablet; Take 1 tablet by mouth Every Evening.  Dispense: 90 tablet; Refill: 1        Follow Up   Return in about 3 months (around 6/2/2022).  Patient was given instructions and counseling regarding his condition or for health maintenance advice. Please see specific information  pulled into the AVS if appropriate.         This document has been electronically signed by Chris Spencer MD

## 2022-03-09 ENCOUNTER — PRIOR AUTHORIZATION (OUTPATIENT)
Dept: FAMILY MEDICINE CLINIC | Facility: CLINIC | Age: 52
End: 2022-03-09

## 2022-03-09 NOTE — TELEPHONE ENCOUNTER
Pt insurance no longer covers trulicity 0.75mg. PA attempted, denied. Formulary bydureon pen, byetta, ozempic,or victoza.  Please advise.

## 2022-03-16 ENCOUNTER — TELEPHONE (OUTPATIENT)
Dept: FAMILY MEDICINE CLINIC | Facility: CLINIC | Age: 52
End: 2022-03-16

## 2022-03-16 NOTE — TELEPHONE ENCOUNTER
Spoke with pt to let him know had sample available for him to  at his convenience. Voiced understanding.

## 2022-03-16 NOTE — TELEPHONE ENCOUNTER
Mr. Benedict would like to know if there is any more samples of the  trulicity 0.75mg please call if/when ready

## 2022-03-17 ENCOUNTER — PRIOR AUTHORIZATION (OUTPATIENT)
Dept: FAMILY MEDICINE CLINIC | Facility: CLINIC | Age: 52
End: 2022-03-17

## 2022-03-23 ENCOUNTER — TELEPHONE (OUTPATIENT)
Dept: FAMILY MEDICINE CLINIC | Facility: CLINIC | Age: 52
End: 2022-03-23

## 2022-03-23 NOTE — TELEPHONE ENCOUNTER
Micheline from pharmacy called in regards to RX for ozempic. States the 2mg/1.5mL is no longer available. Would like to know if okay to switch to the 4mg/3mL. Also wanted to verify if did want pt to start on the 0.5mg as typically pts start at 0.25mg with ozempic.    Please advise.

## 2022-03-23 NOTE — TELEPHONE ENCOUNTER
Spoke with pharmacy to let know pt should start 0.5mg dose and okay for larger pen to be filled for ozempic.

## 2022-04-15 DIAGNOSIS — F41.9 ANXIETY: ICD-10-CM

## 2022-04-15 RX ORDER — BUSPIRONE HYDROCHLORIDE 15 MG/1
TABLET ORAL
Qty: 120 TABLET | Refills: 1 | Status: SHIPPED | OUTPATIENT
Start: 2022-04-15 | End: 2022-06-14

## 2022-06-13 ENCOUNTER — OFFICE VISIT (OUTPATIENT)
Dept: FAMILY MEDICINE CLINIC | Facility: CLINIC | Age: 52
End: 2022-06-13

## 2022-06-13 VITALS
HEIGHT: 73 IN | OXYGEN SATURATION: 96 % | SYSTOLIC BLOOD PRESSURE: 138 MMHG | WEIGHT: 216.2 LBS | TEMPERATURE: 97.1 F | BODY MASS INDEX: 28.65 KG/M2 | DIASTOLIC BLOOD PRESSURE: 80 MMHG | HEART RATE: 103 BPM

## 2022-06-13 DIAGNOSIS — G47.01 INSOMNIA DUE TO MEDICAL CONDITION: ICD-10-CM

## 2022-06-13 DIAGNOSIS — G62.9 NEUROPATHY: ICD-10-CM

## 2022-06-13 DIAGNOSIS — M54.40 CHRONIC BILATERAL LOW BACK PAIN WITH SCIATICA, SCIATICA LATERALITY UNSPECIFIED: ICD-10-CM

## 2022-06-13 DIAGNOSIS — G89.29 CHRONIC BILATERAL LOW BACK PAIN WITH SCIATICA, SCIATICA LATERALITY UNSPECIFIED: ICD-10-CM

## 2022-06-13 DIAGNOSIS — E11.8 TYPE 2 DIABETES MELLITUS WITH COMPLICATION, WITH LONG-TERM CURRENT USE OF INSULIN: ICD-10-CM

## 2022-06-13 DIAGNOSIS — Z79.4 TYPE 2 DIABETES MELLITUS WITH COMPLICATION, WITH LONG-TERM CURRENT USE OF INSULIN: ICD-10-CM

## 2022-06-13 DIAGNOSIS — I10 ESSENTIAL HYPERTENSION: ICD-10-CM

## 2022-06-13 LAB
EXPIRATION DATE: ABNORMAL
HBA1C MFR BLD: 8.4 %
Lab: ABNORMAL

## 2022-06-13 PROCEDURE — 83036 HEMOGLOBIN GLYCOSYLATED A1C: CPT | Performed by: FAMILY MEDICINE

## 2022-06-13 PROCEDURE — 99214 OFFICE O/P EST MOD 30 MIN: CPT | Performed by: FAMILY MEDICINE

## 2022-06-13 RX ORDER — QUETIAPINE FUMARATE 25 MG/1
25 TABLET, FILM COATED ORAL NIGHTLY
Qty: 30 TABLET | Refills: 3 | Status: SHIPPED | OUTPATIENT
Start: 2022-06-13 | End: 2022-09-13 | Stop reason: SDUPTHER

## 2022-06-13 RX ORDER — PEN NEEDLE, DIABETIC 30 GX3/16"
1 NEEDLE, DISPOSABLE MISCELLANEOUS 4 TIMES DAILY
Qty: 200 EACH | Refills: 5 | Status: SHIPPED | OUTPATIENT
Start: 2022-06-13

## 2022-06-13 RX ORDER — GABAPENTIN 800 MG/1
800 TABLET ORAL 4 TIMES DAILY
Qty: 120 TABLET | Refills: 2 | Status: SHIPPED | OUTPATIENT
Start: 2022-06-13 | End: 2022-09-13 | Stop reason: SDUPTHER

## 2022-06-13 RX ORDER — INSULIN LISPRO 100 [IU]/ML
20 INJECTION, SOLUTION INTRAVENOUS; SUBCUTANEOUS
Qty: 30 ML | Refills: 3 | Status: SHIPPED | OUTPATIENT
Start: 2022-06-13 | End: 2022-09-13 | Stop reason: SDUPTHER

## 2022-06-13 NOTE — PROGRESS NOTES
[facility] VAD/Heart Failure Progress Note    Admit Date: 2021  POD:  * No surgery found *    Procedure:          Subjective:   Continues to feel better; abx's through the week; good flows     Objective:   Vitals:  Blood pressure 100/80, pulse 81, temperature 98.2 °F (36.8 °C), resp. rate 18, height 5' 7\" (1.702 m), weight 238 lb 12.1 oz (108.3 kg), SpO2 98 %. Temp (24hrs), Av.2 °F (36.8 °C), Min:97.4 °F (36.3 °C), Max:99 °F (37.2 °C)    Hemodynamics:   CO:     CI:     CVP: CVP (mmHg): 10 mmHg (21 0700)   SVR:     PAP Systolic:     PAP Diastolic:     PVR:     IS25:     SCV02:      VAD Interrogation: LVAD (Heartmate)  Pump Speed (RPM): 9400  Pump Flow (LPM): 5.1  Chatter in Lines: No  PI (Pulsitility Index): 4.6  Power: 5.7  MAP: 84   Test: No  Back Up  at Bedside & Labeled: Yes  Power Module Test: No  Driveline Site Care: No  Driveline Dressing: Clean, Dry, and Intact    EKG/Rhythm:      Extubation Date / Time:     CT Output:     Ventilator:  Ventilator Volumes  Vt Set (ml): 450 ml (21 0750)  Vt Exhaled (Machine Breath) (ml): 473 ml (21 0750)  Vt Spont (ml): 465 ml (21 0133)  Ve Observed (l/min): 10.1 l/min (21 0133)    Oxygen Therapy:  Oxygen Therapy  O2 Sat (%): 98 % (21 1155)  Pulse via Oximetry: 89 beats per minute (21 0351)  O2 Device: CPAP mask (21 0445)  Skin Assessment: Clean, dry, & intact (21 2100)  Skin Protection for O2 Device: No (21 0800)  O2 Flow Rate (L/min): 1 l/min (21 1449)  O2 Temperature:  (HME) (21 0328)  FIO2 (%): 40 % (21 1200)  ETCO2 (mmHg): 36 mmHg (21 0200)    CXR:    Admission Weight: Last Weight   Weight: 269 lb 10 oz (122.3 kg) Weight: 238 lb 12.1 oz (108.3 kg)     Intake / Output / Drain:  Current Shift: No intake/output data recorded.   Last 24 hrs.:     Intake/Output Summary (Last 24 hours) at 2021 1542  Last data filed at 2021 Chief Complaint  Diabetes and Pain    Subjective          Review of Systems   Constitutional: Positive for fatigue. Negative for activity change, chills and fever.   HENT: Negative for dental problem, ear pain and postnasal drip.    Eyes: Negative for pain, discharge and visual disturbance.   Respiratory: Negative for apnea, shortness of breath and wheezing.    Cardiovascular: Negative for chest pain, palpitations and leg swelling.   Gastrointestinal: Negative for blood in stool and constipation.   Endocrine: Positive for polydipsia and polyuria. Negative for cold intolerance, heat intolerance and polyphagia.   Genitourinary: Negative for dysuria and penile discharge.   Musculoskeletal: Positive for back pain.        Sciatica, L side chronic       Skin: Negative for color change, pallor and wound.   Allergic/Immunologic: Negative for environmental allergies and food allergies.   Neurological: Positive for weakness. Negative for dizziness, tremors, seizures, syncope, facial asymmetry, speech difficulty, light-headedness and headaches.        Diabetic Neuropathy   Hematological: Negative.    Psychiatric/Behavioral: Positive for decreased concentration, dysphoric mood and sleep disturbance. Negative for agitation, behavioral problems, confusion, hallucinations, self-injury and suicidal ideas. The patient is not nervous/anxious.        Mine Benedict presents to [facility] [place]  Diabetes  He has type 2 diabetes mellitus. No MedicAlert identification noted. The initial diagnosis of diabetes was made 27 years ago. Hypoglycemia symptoms include sleepiness. Pertinent negatives for hypoglycemia include no confusion, dizziness, headaches, hunger, mood changes, nervousness/anxiousness, pallor, seizures, speech difficulty or tremors. Associated symptoms include fatigue, foot paresthesias, polydipsia, polyuria and weakness. Pertinent negatives for diabetes include no chest  2330  Gross per 24 hour   Intake 370 ml   Output 0 ml   Net 370 ml             No results for input(s): CPK, CKMB, TROIQ in the last 72 hours.   Recent Labs     05/19/21 0454 05/19/21 0453 05/18/21 0354 05/17/21 0432     --  135* 136   K 3.9  --  4.0 4.2   CO2 27  --  23 26   BUN 27*  --  51* 41*   CREA 3.86*  --  5.74* 4.88*   *  --  130* 111*   PHOS  --  3.6 5.0* 3.9   MG  --  2.4 2.5* 2.4   WBC 3.5*  --  3.7 3.7   HGB 9.1*  --  9.0* 8.6*   HCT 31.2*  --  30.0* 28.3*   *  --  139* 127*     Recent Labs     05/19/21 0453 05/18/21 0354 05/17/21 0432   INR 3.9* 4.2* 4.1*   PTP 38.2* 40.9* 40.6*     No lab exists for component: PBNP        Current Facility-Administered Medications:     warfarin - HOLD 5/19, , Other, ONCE, Braxton, Triny B, NP    artificial saliva (MOUTH KOTE) 1 Spray, 1 Spray, Oral, PRN, Braxton, Triny B, NP    cephALEXin (KEFLEX) capsule 250 mg, 250 mg, Oral, BID, Braxton, Triny B, NP    PARoxetine (PAXIL) tablet 20 mg, 20 mg, Oral, DAILY, Festus Ribeiro MD, 20 mg at 05/19/21 0174    hydrocortisone (CORTAID) 1 % cream, , Topical, PRN, Festus Ribeiro MD    senna-docusate (PERICOLACE) 8.6-50 mg per tablet 1 Tab, 1 Tablet, Oral, DAILY, Braxton Triny B, NP, 1 Tablet at 05/17/21 1237    glucose chewable tablet 16 g, 4 Tablet, Oral, PRN, Elda Pineda MD    dextrose (D50W) injection syrg 12.5-25 g, 25-50 mL, IntraVENous, PRN, Elda Pineda MD    glucagon [facility]) injection 1 mg, 1 mg, IntraMUSCular, PRN, Elda Pineda MD    insulin lispro (HUMALOG) injection, , SubCUTAneous, AC&HS, Carline Gallego MD, 2 Units at 05/19/21 1217    famotidine (PEPCID) tablet 20 mg, 20 mg, Oral, DAILY, Rodolfo Wilkins MD, 20 mg at 05/19/21 6779    benzocaine-zinc cl-benzalkonium cl (ORAJEL) 20-0.1-0.02 % mucosal gel, , Oral, PRN, Rodolfo Wilkins MD    magic mouthwash cpd (without sucralfate), 5 mL, Oral, DAILY PRN, Rodolfo Wilkins MD, 5 mL at 05/19/21 1507    anidulafungin (ERAXIS) 100 mg pain, no foot ulcerations and no polyphagia. Hypoglycemia complications include nocturnal hypoglycemia. Pertinent negatives for hypoglycemia complications include no blackouts, no hospitalization, no required assistance and no required glucagon injection. Symptoms are stable. Diabetic complications include peripheral neuropathy. Pertinent negatives for diabetic complications include no heart disease or nephropathy. Risk factors for coronary artery disease include hypertension, obesity, sedentary lifestyle, stress and tobacco exposure. Current diabetic treatment includes diet, insulin injections and oral agent (dual therapy). He is compliant with treatment all of the time. He is currently taking insulin pre-breakfast, pre-dinner and at bedtime. Insulin injections are given by patient. Rotation sites for injection include the abdominal wall and thighs. His weight is decreasing steadily. He is following a generally healthy diet. Meal planning includes carbohydrate counting. He has not had a previous visit with a dietitian. He participates in exercise weekly. He monitors blood glucose at home 1-2 x per day. He monitors urine at home <1 x per month. Blood glucose monitoring compliance is fair. His home blood glucose trend is fluctuating minimally. His dinner blood glucose range is generally 130-140 mg/dl. His overall blood glucose range is 180-200 mg/dl. He does not see a podiatrist.Eye exam is not current.   Hypertension  This is a chronic problem. The current episode started more than 1 year ago. The problem has been waxing and waning since onset. The problem is controlled. Associated symptoms include anxiety. Pertinent negatives include no chest pain, headaches, palpitations or shortness of breath. Past treatments include ACE inhibitors and beta blockers. Current antihypertension treatment includes beta blockers and ACE inhibitors. The current treatment provides significant improvement.   Pain  This is a chronic  in 0.9% sodium chloride 130 mL IVPB, 100 mg, IntraVENous, Q24H, Katja Ramos MD, 100 mg at 05/18/21 2040    balsam peru-castor oiL (VENELEX) ointment, , Topical, BID, Brown Pina MD, Given at 05/19/21 1035    albumin human 25% (BUMINATE) solution 50 g, 50 g, IntraVENous, DIALYSIS PRN, Jaspreet Mendoza MD    heparin (porcine) 1,000 unit/mL injection 1,900 Units, 1,900 Units, InterCATHeter, DIALYSIS PRN, 1,900 Units at 05/18/21 1522 **AND** heparin (porcine) 1,000 unit/mL injection 1,900 Units, 1,900 Units, InterCATHeter, DIALYSIS PRN, Jaspreet Mendoza MD, 1,900 Units at 05/18/21 1521    hydrALAZINE (APRESOLINE) 20 mg/mL injection 20 mg, 20 mg, IntraVENous, Q6H PRN, Braxton, Triny B, NP    hydrALAZINE (APRESOLINE) 20 mg/mL injection 10 mg, 10 mg, IntraVENous, Q6H PRN, Braxton, Triny B, NP, 10 mg at 05/13/21 1052    ranolazine ER (RANEXA) tablet 1,000 mg, 1,000 mg, Oral, BID, Braxton, Triny B, NP, 1,000 mg at 05/19/21 1033    hydrALAZINE (APRESOLINE) tablet 100 mg, 100 mg, Oral, TID, Braxton, Triny B, NP, 100 mg at 05/19/21 0926    isosorbide mononitrate ER (IMDUR) tablet 30 mg, 30 mg, Oral, DAILY, Braxton, Triny B, NP, 30 mg at 05/19/21 0926    tafamidis cap 61 mg (Patient Supplied), 61 mg, Oral, DAILY, Brown Pina MD, 61 mg at 05/19/21 1034    ondansetron (ZOFRAN) injection 4 mg, 4 mg, IntraVENous, Q4H PRN, Valeriy Morrow MD    epoetin amalia-epbx (RETACRIT) injection 20,000 Units, 20,000 Units, SubCUTAneous, Q TUE, THU & SAT, Rich Adams MD, 20,000 Units at 05/18/21 2041    zinc oxide-cod liver oil (DESITIN) 40 % paste, , Topical, Q12H, Herb Jose MCGREGOR MD, Given at 05/19/21 1035    sodium chloride (NS) flush 5-10 mL, 5-10 mL, IntraVENous, PRN, Angie Jimenez MD    sodium chloride (NS) flush 5-40 mL, 5-40 mL, IntraVENous, Q8H, Misha Lua MD, 10 mL at 05/19/21 1507    sodium chloride (NS) flush 5-40 mL, 5-40 mL, IntraVENous, PRN, Sun, problem. The current episode started more than 1 year ago. The problem occurs daily. The problem has been waxing and waning. Associated symptoms include fatigue and weakness. Pertinent negatives include no chest pain, chills, fever or headaches. He has tried NSAIDs, acetaminophen, oral narcotics, immobilization, relaxation, rest and walking (Neurontin) for the symptoms. The treatment provided mild relief.   Depression  Visit Type: follow-up  Patient presents with the following symptoms: decreased concentration.  Patient is not experiencing: confusion, nervousness/anxiety, palpitations, shortness of breath and suicidal ideas.    Anxiety  Presents for follow-up visit. Symptoms include decreased concentration. Patient reports no chest pain, confusion, dizziness, nervous/anxious behavior, palpitations, shortness of breath or suicidal ideas. The severity of symptoms is moderate. The quality of sleep is fair.     His past medical history is significant for depression.   Neurologic Problem  The patient's primary symptoms include weakness. The patient's pertinent negatives include no syncope. This is a chronic problem. The current episode started more than 1 year ago. The neurological problem developed gradually. The problem has been gradually improving since onset. Associated symptoms include back pain and fatigue. Pertinent negatives include no chest pain, confusion, dizziness, fever, headaches, light-headedness, palpitations or shortness of breath. Treatments tried: Neurontin, Cymbalta. The treatment provided moderate relief. There is no history of mood changes.   Back Pain  This is a recurrent problem. The current episode started more than 1 month ago. The problem occurs constantly. The problem has been gradually worsening since onset. The pain is present in the lumbar spine. The pain radiates to the left thigh. The pain is at a severity of 5/10. The pain is severe. The pain is worse during the night. The symptoms are  "aggravated by position. Associated symptoms include weakness. Pertinent negatives include no chest pain, dysuria, fever or headaches. Risk factors: H/O lumbar fusion. He has tried analgesics, home exercises, heat, muscle relaxant and NSAIDs for the symptoms. The treatment provided mild relief.   Heart Problem  This is a chronic (Stress, increased HR) problem. The current episode started more than 1 month ago. The problem occurs daily. The problem has been gradually improving (HR improved with BB). Associated symptoms include fatigue and weakness. Pertinent negatives include no chest pain, chills, fever or headaches. The symptoms are aggravated by stress (Recent death of Mother. Legal problems. ). He has tried relaxation, sleep and rest for the symptoms. The treatment provided moderate relief.       Objective   Vital Signs:   /80 (BP Location: Right arm, Patient Position: Sitting, Cuff Size: Adult)   Pulse 103   Temp 97.1 °F (36.2 °C) (Temporal)   Ht 185.4 cm (73\")   Wt 98.1 kg (216 lb 3.2 oz)   SpO2 96%   BMI 28.52 kg/m²     Physical Exam  Vitals and nursing note reviewed.   Constitutional:       General: He is not in acute distress.     Appearance: Normal appearance. He is obese. He is not ill-appearing, toxic-appearing or diaphoretic.   HENT:      Head: Normocephalic.      Right Ear: Tympanic membrane, ear canal and external ear normal. There is no impacted cerumen.      Left Ear: Tympanic membrane, ear canal and external ear normal. There is no impacted cerumen.      Nose: Nose normal. No congestion or rhinorrhea.      Mouth/Throat:      Mouth: Mucous membranes are moist.      Pharynx: Oropharynx is clear. No oropharyngeal exudate or posterior oropharyngeal erythema.   Eyes:      General: No scleral icterus.        Right eye: No discharge.         Left eye: No discharge.      Pupils: Pupils are equal, round, and reactive to light.   Neck:      Vascular: No carotid bruit.   Cardiovascular:      Rate " Marco Irvin MD    acetaminophen (TYLENOL) tablet 650 mg, 650 mg, Oral, Q6H PRN **OR** acetaminophen (TYLENOL) suppository 650 mg, 650 mg, Rectal, Q6H PRN, Orion Farmer MD    albuterol-ipratropium (DUO-NEB) 2.5 MG-0.5 MG/3 ML, 3 mL, Nebulization, Q6H PRN, Orion Farmer MD, 3 mL at 05/19/21 1045    Warfarin - Pharmacy to Dose, , Other, Rx Dosing/Monitoring, Polliard, Gwen Bumpers, NP    sodium chloride (NS) flush 5-40 mL, 5-40 mL, IntraVENous, Q8H, Braxton, Triny B, NP, 10 mL at 05/19/21 1507    sodium chloride (NS) flush 5-40 mL, 5-40 mL, IntraVENous, PRN, Braxton, Triny B, NP    nystatin (MYCOSTATIN) 100,000 unit/gram powder, , Topical, PRN, Sujatha Rodriguez MD    A/P  S/P LVAD - good flows  Renal failure - HD  Urosepsis - Abx's  Need for anticoagulation - coumadin      Risk of morbidity and mortality - high  Medical decision making - high complexity    Signed By: Guerda Alarcon MD and Rhythm: Normal rate and regular rhythm.      Heart sounds: No murmur heard.    No friction rub. No gallop.   Pulmonary:      Effort: Pulmonary effort is normal. No respiratory distress.      Breath sounds: Normal breath sounds. No stridor. No wheezing, rhonchi or rales.   Chest:      Chest wall: No tenderness.   Abdominal:      General: Abdomen is flat. Bowel sounds are normal. There is no distension.      Palpations: Abdomen is soft. There is no mass.      Tenderness: There is no abdominal tenderness. There is no right CVA tenderness, left CVA tenderness, guarding or rebound.      Hernia: No hernia is present.   Musculoskeletal:         General: Tenderness present.      Cervical back: No rigidity or tenderness. No muscular tenderness.   Lymphadenopathy:      Cervical: No cervical adenopathy.   Skin:     General: Skin is warm and dry.      Capillary Refill: Capillary refill takes 2 to 3 seconds.      Coloration: Skin is not jaundiced or pale.      Findings: No bruising, erythema, lesion or rash.   Neurological:      Mental Status: He is alert and oriented to person, place, and time.      Cranial Nerves: No cranial nerve deficit.      Sensory: No sensory deficit.      Motor: No weakness.      Coordination: Coordination normal.      Gait: Gait normal.      Deep Tendon Reflexes: Reflexes normal.   Psychiatric:         Mood and Affect: Mood normal.         Thought Content: Thought content normal.         Judgment: Judgment normal.        Result Review :     A1C Last 3 Results    HGBA1C Last 3 Results 10/18/21 3/2/22 6/13/22   Hemoglobin A1C 8.7 10.6 8.4                     Assessment and Plan    Diagnoses and all orders for this visit:    1. Type 2 diabetes mellitus with complication, with long-term current use of insulin ([facility])  -     POC Glycated Hemoglobin, Total  -     Semaglutide, 1 MG/DOSE, 4 MG/3ML solution pen-injector; Inject 1 mg under the skin into the appropriate area as directed 1 (One) Time Per Week.   Dispense: 9 pen; Refill: 2  -     Insulin Lispro, 1 Unit Dial, (HumaLOG KwikPen) 100 UNIT/ML solution pen-injector; Inject 20 Units under the skin into the appropriate area as directed 3 (Three) Times a Day Before Meals.  Dispense: 30 mL; Refill: 3  -     Insulin Pen Needle (Pen Needles) 32G X 4 MM misc; 1 each 4 (Four) Times a Day. Use to inject insulin 4 times daily  Dispense: 200 each; Refill: 5    2. Neuropathy  -     gabapentin (NEURONTIN) 800 MG tablet; Take 1 tablet by mouth 4 (Four) Times a Day.  Dispense: 120 tablet; Refill: 2    3. Insomnia due to medical condition  -     QUEtiapine (SEROquel) 25 MG tablet; Take 1 tablet by mouth Every Night. As needed  Dispense: 30 tablet; Refill: 3    4. Chronic bilateral low back pain with sciatica, sciatica laterality unspecified    5. Essential hypertension        Follow Up   Return in about 3 months (around 9/13/2022).  Patient was given instructions and counseling regarding his condition or for health maintenance advice. Please see specific information pulled into the AVS if appropriate.         This document has been electronically signed by Chris Spencer MD on June 13, 2022 18:24 CDT

## 2022-06-14 DIAGNOSIS — F41.9 ANXIETY: ICD-10-CM

## 2022-06-14 RX ORDER — BUSPIRONE HYDROCHLORIDE 15 MG/1
TABLET ORAL
Qty: 120 TABLET | Refills: 1 | Status: SHIPPED | OUTPATIENT
Start: 2022-06-14 | End: 2022-08-15

## 2022-07-11 ENCOUNTER — TELEPHONE (OUTPATIENT)
Dept: FAMILY MEDICINE CLINIC | Facility: CLINIC | Age: 52
End: 2022-07-11

## 2022-07-11 RX ORDER — LISINOPRIL 40 MG/1
40 TABLET ORAL DAILY
Qty: 30 TABLET | Refills: 2 | Status: SHIPPED | OUTPATIENT
Start: 2022-07-11 | End: 2022-09-13 | Stop reason: SDUPTHER

## 2022-07-11 NOTE — TELEPHONE ENCOUNTER
Pt concerned as BP has been running high the last 48 hours. 140-150's over , also c/o of a bad headache. HR is fine, 80-84.  Has taken a couple extra doses of lisinopril, has not taken carvedilol the last two days.. Has also been taking low dose ASA. Would like to know what to do in regards to BP.    Please advise.

## 2022-07-11 NOTE — TELEPHONE ENCOUNTER
Will increase Lisinopril to 40 mg daily. Max dose is 80 mg daily. Carvedilol take twice daily will help, recommend restarting. Decrease salt in diet, goal is B/P 140/90 or less. Keep me updated.

## 2022-07-12 NOTE — TELEPHONE ENCOUNTER
Spoke with pt to inform lisinopril was increased, pt had already gotten new RX. Also informed pt to restart carvedilol. Pt voiced understanding. Pt will also continue low dose ASA.    Pt stated has been monitoring heart rhythm, no a-fib, in sinus rhythm.

## 2022-07-14 RX ORDER — HYDROCHLOROTHIAZIDE 25 MG/1
25 TABLET ORAL DAILY
Qty: 30 TABLET | Refills: 2 | Status: SHIPPED | OUTPATIENT
Start: 2022-07-14 | End: 2022-09-13 | Stop reason: SDUPTHER

## 2022-07-19 DIAGNOSIS — Z13.6 SCREENING FOR ISCHEMIC HEART DISEASE: ICD-10-CM

## 2022-07-19 DIAGNOSIS — I10 ESSENTIAL HYPERTENSION: Primary | ICD-10-CM

## 2022-07-19 RX ORDER — CARVEDILOL 12.5 MG/1
12.5 TABLET ORAL 2 TIMES DAILY WITH MEALS
Qty: 60 TABLET | Refills: 1 | Status: SHIPPED | OUTPATIENT
Start: 2022-07-19 | End: 2022-09-13 | Stop reason: SDUPTHER

## 2022-08-15 ENCOUNTER — LAB (OUTPATIENT)
Dept: LAB | Facility: HOSPITAL | Age: 52
End: 2022-08-15

## 2022-08-15 ENCOUNTER — OFFICE VISIT (OUTPATIENT)
Dept: CARDIOLOGY | Facility: CLINIC | Age: 52
End: 2022-08-15

## 2022-08-15 VITALS
WEIGHT: 210 LBS | DIASTOLIC BLOOD PRESSURE: 82 MMHG | HEIGHT: 73 IN | OXYGEN SATURATION: 99 % | SYSTOLIC BLOOD PRESSURE: 144 MMHG | HEART RATE: 88 BPM | BODY MASS INDEX: 27.83 KG/M2

## 2022-08-15 DIAGNOSIS — R07.9 CHEST PAIN, UNSPECIFIED TYPE: Primary | ICD-10-CM

## 2022-08-15 DIAGNOSIS — F41.9 ANXIETY: ICD-10-CM

## 2022-08-15 DIAGNOSIS — R00.2 PALPITATIONS: ICD-10-CM

## 2022-08-15 DIAGNOSIS — I10 HYPERTENSION, UNSPECIFIED TYPE: ICD-10-CM

## 2022-08-15 DIAGNOSIS — Z72.0 TOBACCO USE: ICD-10-CM

## 2022-08-15 LAB — TSH SERPL DL<=0.05 MIU/L-ACNC: 1.17 UIU/ML (ref 0.27–4.2)

## 2022-08-15 PROCEDURE — 84443 ASSAY THYROID STIM HORMONE: CPT | Performed by: INTERNAL MEDICINE

## 2022-08-15 PROCEDURE — 99204 OFFICE O/P NEW MOD 45 MIN: CPT | Performed by: INTERNAL MEDICINE

## 2022-08-15 RX ORDER — BUSPIRONE HYDROCHLORIDE 15 MG/1
TABLET ORAL
Qty: 120 TABLET | Refills: 0 | Status: SHIPPED | OUTPATIENT
Start: 2022-08-15 | End: 2022-09-13 | Stop reason: SDUPTHER

## 2022-08-15 NOTE — PROGRESS NOTES
[facility] Cardiology  OFFICE NOTE    Cardiovascular Medicine  Jarett Soler M.D., [facility]         Chris Spencer MD  [address]  [place] [address]    Thank you for asking me to see Mine Benedict for ischemic heart disease.    History of Present Illness    Mine Benedict is a 52 y.o. male who presents for consultation today.  His medical history is significant for hypertension, dyslipidemia, diabetes (insulin-dependent), obesity, tobacco use (current smoker of 1 PPD of cigarettes, has been smoking since age of 26 years), depression, anxiety per prior documentation.    In October 2021, the patient was experiencing palpitations.  At that time, his apple watch noted 5 episodes of atrial fibrillation.  The strips available on apple watch were reviewed in clinic today; most of them suggest sinus rhythm with PACs.  On one strip, atrial fibrillation could not be entirely excluded.  At that time, he was started on carvedilol and his symptoms resolved.  A few weeks ago, he noted his resting heart rate to be significantly elevated (in 80s-90s bpm range).  His carvedilol dosage was increased and his heart rate got better.  He has not had any palpitations over the past few months.  In addition, he reports intermittent episodes of chest discomfort that occur every couple of days.  These do not seem to be definitively precipitated by exertion.  The pain is located across the entire chest, sometimes is felt as sharp, sometimes is felt as tightness.  He denies having any exertional dyspnea, PND, orthopnea or leg swelling.  He has not had any presyncope or syncope.  He denies having any prior history of coronary artery disease, coronary stents, congestive heart failure, open heart surgeries, pacemaker/defibrillator placement.    Review of Systems - ROS  Constitution: Negative for weakness, weight gain and weight loss.   HENT: Negative for congestion.    Eyes: Negative for  blurred vision.   Cardiovascular: As mentioned above  Respiratory: Negative for cough and hemoptysis.    Endocrine: Negative for polydipsia and polyuria.   Hematologic/Lymphatic: Negative for bleeding problem.    Skin: Negative for flushing.   Musculoskeletal: Negative for neck pain and stiffness.   Gastrointestinal: Negative for abdominal pain, diarrhea, jaundice, melena, nausea and vomiting.   Genitourinary: Negative for dysuria and hematuria.   Neurological: Negative for focal weakness and numbness.   Psychiatric/Behavioral: Negative for altered mental status and depression.      All other systems were reviewed and were negative.    Past Medical History:   Diagnosis Date   • Diabetic neuropathy (HCC)    • Hypertensive disorder    • Lumbago     sciatica due to displacement of lumbar intervertebral disc - S/P Laminectomy      • Major depressive disorder, recurrent (HCC)     unspecified   • Tobacco dependence syndrome    • Type 2 diabetes mellitus (HCC)        Family History: His mother had atrial fibrillation.  family history includes Diabetes in his mother and another family member; Hypertension in his father, mother, and another family member; Other in his sister.    Social History: Denies current or past alcohol or illicit drug abuse.   reports that he has been smoking cigarettes. He has a 24.00 pack-year smoking history. He has never used smokeless tobacco. He reports that he does not drink alcohol and does not use drugs.    Allergies:  No Known Allergies      Current Outpatient Medications:   •  busPIRone (BUSPAR) 15 MG tablet, TAKE 1 TABLET BY MOUTH THREE TIMES A DAY. MAY TAKE AN ADDITIONAL DOSE DAILY, Disp: 120 tablet, Rfl: 0  •  carvedilol (Coreg) 12.5 MG tablet, Take 1 tablet by mouth 2 (Two) Times a Day With Meals., Disp: 60 tablet, Rfl: 1  •  gabapentin (NEURONTIN) 800 MG tablet, Take 1 tablet by mouth 4 (Four) Times a Day., Disp: 120 tablet, Rfl: 2  •  hydroCHLOROthiazide (HYDRODIURIL) 25 MG tablet,  "Take 1 tablet by mouth Daily., Disp: 30 tablet, Rfl: 2  •  HYDROcodone-acetaminophen (NORCO)  MG per tablet, Take 1 tablet by mouth Every 6 (Six) Hours As Needed for Moderate Pain ., Disp: , Rfl:   •  Insulin Glargine (LANTUS SOLOSTAR) 100 UNIT/ML injection pen, Inject 56 Units under the skin into the appropriate area as directed Every Night., Disp: 18 pen, Rfl: 5  •  Insulin Lispro, 1 Unit Dial, (HumaLOG KwikPen) 100 UNIT/ML solution pen-injector, Inject 20 Units under the skin into the appropriate area as directed 3 (Three) Times a Day Before Meals., Disp: 30 mL, Rfl: 3  •  Insulin Pen Needle (Pen Needles) 32G X 4 MM misc, 1 each 4 (Four) Times a Day. Use to inject insulin 4 times daily, Disp: 200 each, Rfl: 5  •  lisinopril (PRINIVIL,ZESTRIL) 40 MG tablet, Take 1 tablet by mouth Daily., Disp: 30 tablet, Rfl: 2  •  mupirocin (Bactroban) 2 % ointment, Apply  topically to the appropriate area as directed 3 (Three) Times a Day., Disp: 30 g, Rfl: 3  •  naproxen (NAPROSYN) 500 MG tablet, , Disp: , Rfl:   •  QUEtiapine (SEROquel) 25 MG tablet, Take 1 tablet by mouth Every Night. As needed, Disp: 30 tablet, Rfl: 3  •  Semaglutide, 1 MG/DOSE, 4 MG/3ML solution pen-injector, Inject 1 mg under the skin into the appropriate area as directed 1 (One) Time Per Week., Disp: 9 pen, Rfl: 2  •  simvastatin (ZOCOR) 40 MG tablet, Take 1 tablet by mouth Every Evening., Disp: 90 tablet, Rfl: 1  •  vitamin B-12 (CYANOCOBALAMIN) 1000 MCG tablet, Take 1,000 mcg by mouth 3 (Three) Times a Week., Disp: , Rfl:     Physical Exam:  Vitals:    08/15/22 0858   BP: 144/82   BP Location: Left arm   Patient Position: Sitting   Cuff Size: Adult   Pulse: 88   SpO2: 99%   Weight: 95.3 kg (210 lb)   Height: 185.4 cm (73\")   PainSc: 0-No pain     Current Pain Level: none  Pulse Ox: Normal  on room air  General: alert, appears stated age and cooperative     Body Habitus: Overweight  HEENT: Head: Normocephalic, no lesions, without obvious " abnormality.     Neuro: alert, oriented x3  JVP: Volume/Pulsation: Normal.  Normal waveforms.   Appropriate inspiratory decrease.     Carotid Exam: no bruit normal pulsation bilaterally   Carotid Volume: normal.     Respirations: no increased work of breathing   Chest:  Normal    Pulmonary:Normal   Heart rate: normal    Heart Rhythm: regular     Heart Sounds: S1: normal  S2: normal  S3: absent   S4: absent   Abdomen:   Appearance: normal .  Palpation: Soft, non-tender to palpation, bowel sounds positive in all four quadrants; no guarding or rebound tenderness  Extremity: no edema.       DATA REVIEWED:     EKG. I personally reviewed and interpreted the EKG.  normal EKG, normal sinus rhythm on 12/2/2021    ECG/EMG Results (all)     None        ---------------------------------------------------  TTE/DEIRDRE:      -----------------------------------------------------  CXR/Imaging:   Imaging Results (Most Recent)     None          -----------------------------------------------------  CT:   No radiology results for the last 30 days.    ----------------------------------------------------      --------------------------------------------------------------------------------------------------  LABS:     The 10-year CVD risk score (Scott et al., 2008) is: 27.9%    Values used to calculate the score:      Age: 52 years      Sex: Male      Diabetic: Yes      Tobacco smoker: Yes      Systolic Blood Pressure: 138 mmHg      Is BP treated: Yes      HDL Cholesterol: 39 mg/dL      Total Cholesterol: 119 mg/dL         Lab Results   Component Value Date    GLUCOSE 194 (H) 05/07/2020    BUN 10 05/07/2020    CREATININE 0.85 05/07/2020    EGFRIFNONA 96 05/07/2020    BCR 11.8 05/07/2020    K 5.3 (H) 05/07/2020    CO2 23.8 05/07/2020    CALCIUM 9.2 05/07/2020    ALBUMIN 4.30 05/07/2020    AST 16 05/07/2020    ALT 12 05/07/2020     Lab Results   Component Value Date    WBC 7.53 05/07/2020    HGB 17.1 05/07/2020    HCT 49.1 05/07/2020     MCV 94.1 05/07/2020     05/07/2020     Lab Results   Component Value Date    CHOL 119 05/07/2020    CHLPL 142 09/21/2016    TRIG 112 05/07/2020    HDL 39 (L) 05/07/2020    LDL 58 05/07/2020     Lab Results   Component Value Date    TSH 2.410 05/07/2020     No results found for: CKTOTAL, CKMB, CKMBINDEX, TROPONINI, TROPONINT  Lab Results   Component Value Date    HGBA1C 8.4 06/13/2022     No results found for: DDIMER  Lab Results   Component Value Date    ALT 12 05/07/2020     Lab Results   Component Value Date    HGBA1C 8.4 06/13/2022    HGBA1C 10.6 03/02/2022    HGBA1C 8.7 10/18/2021     Lab Results   Component Value Date    MICROALBUR <1.2 05/07/2020    CREATININE 0.85 05/07/2020     No results found for: IRON, TIBC, FERRITIN  No results found for: INR, PROTIME    [unfilled]    1. Chest pain, unspecified type  He notes experiencing intermittent episodes of bilateral chest discomfort that occur once every couple of days.  They do not seem to be definitively precipitated by exertion.  Given the presence of multiple coronary risk factors, will proceed with exercise treadmill stress testing for further risk stratification.  - Treadmill Stress Test; Future  - Adult Transthoracic Echo Complete W/ Cont if Necessary Per Protocol; Future    2. Palpitations  In October 2021, the patient was experiencing palpitations.  At that time, his apple watch noted 5 episodes of atrial fibrillation.  The strips available on apple watch were reviewed in clinic today; most of them suggest sinus rhythm with PACs.  On one strip, atrial fibrillation could not be entirely excluded.  At that time, he was started on carvedilol and his symptoms resolved.  A few weeks ago, he noted his resting heart rate to be significantly elevated (in 80s-90s bpm range).  His carvedilol dosage was increased and his heart rate got better.  He has not had any palpitations over the past few months.  Will attempt to scan available strips from apple  watch to our system today.  Obtain serum TSH level today.  Obtain a 30-day live cardiac monitor to look for episodes of occult atrial fibrillation.  Obtain a transthoracic echocardiogram to look for underlying structural heart disease.  Continue carvedilol at current doses.  - Adult Transthoracic Echo Complete W/ Cont if Necessary Per Protocol; Future  - Mobile Cardiac Outpatient Telemetry; Future  - TSH    3. Hypertension, unspecified type  Clinic BP was 144/82 mm Hg today.  His home BP readings (based on verbal report) appear to be within normal range.  No changes were made to his antihypertensive medication regimen today.    4. Tobacco use  He was counseled to quit as detailed below.  He does not appear to be willing to quit.      Prevention:  BMI is >= 25 and <30. (Overweight) The following options were offered after discussion;: referral to primary care      Mine Benedict  reports that he has been smoking cigarettes. He has a 24.00 pack-year smoking history. He has never used smokeless tobacco.. I have educated him on the risk of diseases from using tobacco products such as cancer, COPD and heart disease.     I advised him to quit and he is not willing to quit.    I spent 3  minutes counseling the patient.    Return in about 2 months (around 10/15/2022).            Electronically signed by Jarett Soler MD on 08/15/22 at 08:47 CDT

## 2022-08-19 ENCOUNTER — TELEPHONE (OUTPATIENT)
Dept: CARDIOLOGY | Facility: CLINIC | Age: 52
End: 2022-08-19

## 2022-08-19 NOTE — TELEPHONE ENCOUNTER
RESULTS GIVEN THROUGH [employer]  ----- Message from Jarett Soler MD sent at 8/15/2022  8:44 PM CDT -----  Serum TSH was normal.

## 2022-09-13 ENCOUNTER — LAB (OUTPATIENT)
Dept: LAB | Facility: HOSPITAL | Age: 52
End: 2022-09-13

## 2022-09-13 ENCOUNTER — OFFICE VISIT (OUTPATIENT)
Dept: FAMILY MEDICINE CLINIC | Facility: CLINIC | Age: 52
End: 2022-09-13

## 2022-09-13 VITALS
SYSTOLIC BLOOD PRESSURE: 120 MMHG | BODY MASS INDEX: 27.17 KG/M2 | HEART RATE: 95 BPM | OXYGEN SATURATION: 99 % | HEIGHT: 73 IN | DIASTOLIC BLOOD PRESSURE: 68 MMHG | TEMPERATURE: 97.8 F | WEIGHT: 205 LBS

## 2022-09-13 DIAGNOSIS — G62.9 NEUROPATHY: ICD-10-CM

## 2022-09-13 DIAGNOSIS — R53.82 CHRONIC FATIGUE: ICD-10-CM

## 2022-09-13 DIAGNOSIS — R79.89 LOW TESTOSTERONE: ICD-10-CM

## 2022-09-13 DIAGNOSIS — E11.69 TYPE 2 DIABETES MELLITUS WITH OTHER SPECIFIED COMPLICATION, WITH LONG-TERM CURRENT USE OF INSULIN: Chronic | ICD-10-CM

## 2022-09-13 DIAGNOSIS — E11.8 TYPE 2 DIABETES MELLITUS WITH COMPLICATION, WITH LONG-TERM CURRENT USE OF INSULIN: ICD-10-CM

## 2022-09-13 DIAGNOSIS — I10 ESSENTIAL HYPERTENSION: ICD-10-CM

## 2022-09-13 DIAGNOSIS — E78.2 MIXED HYPERLIPIDEMIA: ICD-10-CM

## 2022-09-13 DIAGNOSIS — Z79.4 TYPE 2 DIABETES MELLITUS WITH OTHER SPECIFIED COMPLICATION, WITH LONG-TERM CURRENT USE OF INSULIN: Chronic | ICD-10-CM

## 2022-09-13 DIAGNOSIS — G47.01 INSOMNIA DUE TO MEDICAL CONDITION: ICD-10-CM

## 2022-09-13 DIAGNOSIS — F41.9 ANXIETY: ICD-10-CM

## 2022-09-13 DIAGNOSIS — Z79.4 TYPE 2 DIABETES MELLITUS WITH COMPLICATION, WITH LONG-TERM CURRENT USE OF INSULIN: ICD-10-CM

## 2022-09-13 LAB
ALBUMIN SERPL-MCNC: 3.9 G/DL (ref 3.5–5.2)
ALBUMIN/GLOB SERPL: 1.4 G/DL
ALP SERPL-CCNC: 94 U/L (ref 39–117)
ALT SERPL W P-5'-P-CCNC: 12 U/L (ref 1–41)
ANION GAP SERPL CALCULATED.3IONS-SCNC: 10 MMOL/L (ref 5–15)
AST SERPL-CCNC: 18 U/L (ref 1–40)
BASOPHILS # BLD AUTO: 0.04 10*3/MM3 (ref 0–0.2)
BASOPHILS NFR BLD AUTO: 0.3 % (ref 0–1.5)
BILIRUB SERPL-MCNC: 0.3 MG/DL (ref 0–1.2)
BUN SERPL-MCNC: 8 MG/DL (ref 6–20)
BUN/CREAT SERPL: 10 (ref 7–25)
CALCIUM SPEC-SCNC: 9.9 MG/DL (ref 8.6–10.5)
CHLORIDE SERPL-SCNC: 102 MMOL/L (ref 98–107)
CO2 SERPL-SCNC: 25 MMOL/L (ref 22–29)
CREAT SERPL-MCNC: 0.8 MG/DL (ref 0.76–1.27)
DEPRECATED RDW RBC AUTO: 45.6 FL (ref 37–54)
EGFRCR SERPLBLD CKD-EPI 2021: 107.8 ML/MIN/1.73
EOSINOPHIL # BLD AUTO: 0.28 10*3/MM3 (ref 0–0.4)
EOSINOPHIL NFR BLD AUTO: 1.8 % (ref 0.3–6.2)
ERYTHROCYTE [DISTWIDTH] IN BLOOD BY AUTOMATED COUNT: 12.8 % (ref 12.3–15.4)
EXPIRATION DATE: ABNORMAL
GLOBULIN UR ELPH-MCNC: 2.8 GM/DL
GLUCOSE SERPL-MCNC: 228 MG/DL (ref 65–99)
HBA1C MFR BLD: 7.9 %
HCT VFR BLD AUTO: 49.9 % (ref 37.5–51)
HGB BLD-MCNC: 16.8 G/DL (ref 13–17.7)
IMM GRANULOCYTES # BLD AUTO: 0.05 10*3/MM3 (ref 0–0.05)
IMM GRANULOCYTES NFR BLD AUTO: 0.3 % (ref 0–0.5)
IRON 24H UR-MRATE: 79 MCG/DL (ref 59–158)
LYMPHOCYTES # BLD AUTO: 2.98 10*3/MM3 (ref 0.7–3.1)
LYMPHOCYTES NFR BLD AUTO: 19.1 % (ref 19.6–45.3)
Lab: ABNORMAL
MCH RBC QN AUTO: 32.1 PG (ref 26.6–33)
MCHC RBC AUTO-ENTMCNC: 33.7 G/DL (ref 31.5–35.7)
MCV RBC AUTO: 95.4 FL (ref 79–97)
MONOCYTES # BLD AUTO: 1 10*3/MM3 (ref 0.1–0.9)
MONOCYTES NFR BLD AUTO: 6.4 % (ref 5–12)
NEUTROPHILS NFR BLD AUTO: 11.24 10*3/MM3 (ref 1.7–7)
NEUTROPHILS NFR BLD AUTO: 72.1 % (ref 42.7–76)
NRBC BLD AUTO-RTO: 0 /100 WBC (ref 0–0.2)
PLATELET # BLD AUTO: 218 10*3/MM3 (ref 140–450)
PMV BLD AUTO: 12.3 FL (ref 6–12)
POTASSIUM SERPL-SCNC: 4.6 MMOL/L (ref 3.5–5.2)
PROT SERPL-MCNC: 6.7 G/DL (ref 6–8.5)
RBC # BLD AUTO: 5.23 10*6/MM3 (ref 4.14–5.8)
SODIUM SERPL-SCNC: 137 MMOL/L (ref 136–145)
VIT B12 BLD-MCNC: 480 PG/ML (ref 211–946)
WBC NRBC COR # BLD: 15.59 10*3/MM3 (ref 3.4–10.8)

## 2022-09-13 PROCEDURE — 3051F HG A1C>EQUAL 7.0%<8.0%: CPT | Performed by: FAMILY MEDICINE

## 2022-09-13 PROCEDURE — 82652 VIT D 1 25-DIHYDROXY: CPT

## 2022-09-13 PROCEDURE — 85025 COMPLETE CBC W/AUTO DIFF WBC: CPT

## 2022-09-13 PROCEDURE — 83036 HEMOGLOBIN GLYCOSYLATED A1C: CPT | Performed by: FAMILY MEDICINE

## 2022-09-13 PROCEDURE — 80053 COMPREHEN METABOLIC PANEL: CPT

## 2022-09-13 PROCEDURE — 82607 VITAMIN B-12: CPT

## 2022-09-13 PROCEDURE — 83540 ASSAY OF IRON: CPT

## 2022-09-13 PROCEDURE — 99214 OFFICE O/P EST MOD 30 MIN: CPT | Performed by: FAMILY MEDICINE

## 2022-09-13 RX ORDER — LISINOPRIL 40 MG/1
40 TABLET ORAL DAILY
Qty: 90 TABLET | Refills: 1 | Status: SHIPPED | OUTPATIENT
Start: 2022-09-13

## 2022-09-13 RX ORDER — SIMVASTATIN 40 MG
40 TABLET ORAL EVERY EVENING
Qty: 90 TABLET | Refills: 1 | Status: SHIPPED | OUTPATIENT
Start: 2022-09-13 | End: 2023-03-14 | Stop reason: SDUPTHER

## 2022-09-13 RX ORDER — GABAPENTIN 800 MG/1
800 TABLET ORAL 4 TIMES DAILY
Qty: 120 TABLET | Refills: 2 | Status: SHIPPED | OUTPATIENT
Start: 2022-09-13

## 2022-09-13 RX ORDER — QUETIAPINE FUMARATE 25 MG/1
25 TABLET, FILM COATED ORAL NIGHTLY
Qty: 90 TABLET | Refills: 1 | Status: SHIPPED | OUTPATIENT
Start: 2022-09-13

## 2022-09-13 RX ORDER — BUSPIRONE HYDROCHLORIDE 15 MG/1
TABLET ORAL
Qty: 120 TABLET | Refills: 3 | Status: SHIPPED | OUTPATIENT
Start: 2022-09-13 | End: 2023-03-14 | Stop reason: SDUPTHER

## 2022-09-13 RX ORDER — CARVEDILOL 12.5 MG/1
12.5 TABLET ORAL 2 TIMES DAILY WITH MEALS
Qty: 180 TABLET | Refills: 1 | Status: SHIPPED | OUTPATIENT
Start: 2022-09-13 | End: 2023-03-14 | Stop reason: SDUPTHER

## 2022-09-13 RX ORDER — INSULIN LISPRO 100 [IU]/ML
20 INJECTION, SOLUTION INTRAVENOUS; SUBCUTANEOUS
Qty: 30 ML | Refills: 3 | Status: SHIPPED | OUTPATIENT
Start: 2022-09-13

## 2022-09-13 RX ORDER — HYDROCHLOROTHIAZIDE 25 MG/1
25 TABLET ORAL DAILY
Qty: 90 TABLET | Refills: 1 | Status: SHIPPED | OUTPATIENT
Start: 2022-09-13

## 2022-09-13 NOTE — PROGRESS NOTES
Chief Complaint  Diabetes, Pain, and Fatigue  Sciatica pain  Subjective          Review of Systems   Constitutional: Positive for fatigue. Negative for activity change, chills and fever.   HENT: Negative for dental problem, ear pain and postnasal drip.    Eyes: Negative for pain, discharge and visual disturbance.   Respiratory: Negative for apnea, shortness of breath and wheezing.    Cardiovascular: Negative for chest pain, palpitations and leg swelling.   Gastrointestinal: Negative for blood in stool and constipation.   Endocrine: Positive for polydipsia and polyuria. Negative for cold intolerance, heat intolerance and polyphagia.   Genitourinary: Negative for dysuria and penile discharge.   Musculoskeletal: Positive for back pain.        Sciatica, L side chronic       Skin: Negative for color change, pallor and wound.   Allergic/Immunologic: Negative for environmental allergies and food allergies.   Neurological: Positive for weakness. Negative for dizziness, tremors, seizures, syncope, facial asymmetry, speech difficulty, light-headedness and headaches.        Diabetic Neuropathy   Hematological: Negative.    Psychiatric/Behavioral: Positive for decreased concentration, dysphoric mood and sleep disturbance. Negative for agitation, behavioral problems, confusion, hallucinations, self-injury and suicidal ideas. The patient is not nervous/anxious.        Mine Benedict presents to [facility] [place]  Diabetes  He presents for his follow-up diabetic visit. He has type 2 diabetes mellitus. Pertinent negatives for hypoglycemia include no confusion, dizziness, headaches, nervousness/anxiousness, pallor, seizures, speech difficulty or tremors. Associated symptoms include fatigue, polydipsia, polyuria and weakness. Pertinent negatives for diabetes include no chest pain and no polyphagia. Symptoms are improving. Diabetic complications include peripheral neuropathy. Risk  "factors for coronary artery disease include diabetes mellitus, dyslipidemia, male sex, stress and tobacco exposure. His weight is decreasing steadily. He is following a generally healthy diet. He participates in exercise intermittently. His overall blood glucose range is 140-180 mg/dl. An ACE inhibitor/angiotensin II receptor blocker is being taken.   Pain  This is a chronic problem. The current episode started more than 1 year ago. The problem occurs daily. Associated symptoms include fatigue and weakness. Pertinent negatives include no chest pain, chills, fever or headaches. The symptoms are aggravated by exertion. He has tried acetaminophen, NSAIDs, relaxation and rest (Gabapentin) for the symptoms. The treatment provided mild relief.   Fatigue  This is a chronic problem. The current episode started more than 1 year ago. The problem occurs daily. The problem has been gradually worsening. Associated symptoms include fatigue and weakness. Pertinent negatives include no chest pain, chills, fever or headaches. Nothing aggravates the symptoms. He has tried rest (Weight loss) for the symptoms. The treatment provided no relief.       Objective   Vital Signs:   /68 (BP Location: Left arm, Patient Position: Sitting, Cuff Size: Adult)   Pulse 95   Temp 97.8 °F (36.6 °C) (Temporal)   Ht 185.4 cm (73\")   Wt 93 kg (205 lb)   SpO2 99%   BMI 27.05 kg/m²     Physical Exam  Vitals and nursing note reviewed.   Constitutional:       General: He is not in acute distress.     Appearance: Normal appearance. He is obese. He is not ill-appearing, toxic-appearing or diaphoretic.   HENT:      Head: Normocephalic.      Right Ear: Tympanic membrane, ear canal and external ear normal. There is no impacted cerumen.      Left Ear: Tympanic membrane, ear canal and external ear normal. There is no impacted cerumen.      Nose: Nose normal. No congestion or rhinorrhea.      Mouth/Throat:      Mouth: Mucous membranes are moist.      " Pharynx: Oropharynx is clear. No oropharyngeal exudate or posterior oropharyngeal erythema.   Eyes:      General: No scleral icterus.        Right eye: No discharge.         Left eye: No discharge.      Pupils: Pupils are equal, round, and reactive to light.   Neck:      Vascular: No carotid bruit.   Cardiovascular:      Rate and Rhythm: Normal rate and regular rhythm.      Pulses:           Dorsalis pedis pulses are 2+ on the right side and 2+ on the left side.      Heart sounds: No murmur heard.    No friction rub. No gallop.   Pulmonary:      Effort: Pulmonary effort is normal. No respiratory distress.      Breath sounds: Normal breath sounds. No stridor. No wheezing, rhonchi or rales.   Chest:      Chest wall: No tenderness.   Abdominal:      General: Abdomen is flat. Bowel sounds are normal. There is no distension.      Palpations: Abdomen is soft. There is no mass.      Tenderness: There is no abdominal tenderness. There is no right CVA tenderness, left CVA tenderness, guarding or rebound.      Hernia: No hernia is present.   Musculoskeletal:         General: Tenderness present. No swelling or signs of injury.      Cervical back: No rigidity or tenderness. No muscular tenderness.      Right foot: Deformity present.      Left foot: Deformity present.   Feet:      Right foot:      Protective Sensation: 5 sites tested. 0 sites sensed.      Skin integrity: Dry skin present.      Left foot:      Protective Sensation: 5 sites tested. 0 sites sensed.      Skin integrity: Dry skin present.   Lymphadenopathy:      Cervical: No cervical adenopathy.   Skin:     General: Skin is warm and dry.      Capillary Refill: Capillary refill takes 2 to 3 seconds.      Coloration: Skin is not jaundiced or pale.      Findings: No bruising, erythema, lesion or rash.   Neurological:      Mental Status: He is alert and oriented to person, place, and time.      Cranial Nerves: No cranial nerve deficit.      Sensory: No sensory deficit.       Motor: No weakness.      Coordination: Coordination normal.      Gait: Gait normal.      Deep Tendon Reflexes: Reflexes normal.   Psychiatric:         Mood and Affect: Mood normal.         Thought Content: Thought content normal.         Judgment: Judgment normal.        Result Review :                 Assessment and Plan    Diagnoses and all orders for this visit:    1. Type 2 diabetes mellitus with other specified complication, with long-term current use of insulin ([facility])  Comments:  Hypertension, Neuropathy, Hyperlipidemia  Orders:  -     POC Glycated Hemoglobin, Total    2. Chronic fatigue  -     Cancel: Testosterone (Free & Total), LC / MS; Future  -     CBC and Differential; Future  -     Comprehensive metabolic panel; Future  -     Iron; Future  -     Testosterone, free, total; Future  -     Vitamin B12; Future  -     Vitamin D 1,25 dihydroxy; Future    3. Low testosterone  -     Ambulatory Referral to Urology    4. Anxiety  -     busPIRone (BUSPAR) 15 MG tablet; TAKE 1 TABLET BY MOUTH THREE TIMES DAILY. MAY TAKE ADDITIONAL DOSE DAILY  Dispense: 120 tablet; Refill: 3    5. Type 2 diabetes mellitus with complication, with long-term current use of insulin ([facility])  -     Insulin Glargine (LANTUS SOLOSTAR) 100 UNIT/ML injection pen; Inject 56 Units under the skin into the appropriate area as directed Every Night.  Dispense: 24 mL; Refill: 3  -     Insulin Lispro, 1 Unit Dial, (HumaLOG KwikPen) 100 UNIT/ML solution pen-injector; Inject 20 Units under the skin into the appropriate area as directed 3 (Three) Times a Day Before Meals.  Dispense: 30 mL; Refill: 3  -     Semaglutide, 1 MG/DOSE, 4 MG/3ML solution pen-injector; Inject 1 mg under the skin into the appropriate area as directed 1 (One) Time Per Week.  Dispense: 6 mL; Refill: 3    6. Insomnia due to medical condition  -     QUEtiapine (SEROquel) 25 MG tablet; Take 1 tablet by mouth Every Night. As needed  Dispense: 90 tablet; Refill: 1    7. Mixed  hyperlipidemia  -     simvastatin (ZOCOR) 40 MG tablet; Take 1 tablet by mouth Every Evening.  Dispense: 90 tablet; Refill: 1    8. Neuropathy  -     Alpha-Lipoic Acid 300 MG tablet; Take 300 mg by mouth 2 (Two) Times a Day.  Dispense: 60 each; Refill: 2  -     gabapentin (NEURONTIN) 800 MG tablet; Take 1 tablet by mouth 4 (Four) Times a Day.  Dispense: 120 tablet; Refill: 2    9. Essential hypertension  -     carvedilol (Coreg) 12.5 MG tablet; Take 1 tablet by mouth 2 (Two) Times a Day With Meals.  Dispense: 180 tablet; Refill: 1  -     hydroCHLOROthiazide (HYDRODIURIL) 25 MG tablet; Take 1 tablet by mouth Daily.  Dispense: 90 tablet; Refill: 1  -     lisinopril (PRINIVIL,ZESTRIL) 40 MG tablet; Take 1 tablet by mouth Daily.  Dispense: 90 tablet; Refill: 1        Follow Up   Return in about 3 months (around 12/13/2022).  Patient was given instructions and counseling regarding his condition or for health maintenance advice. Please see specific information pulled into the AVS if appropriate.         This document has been electronically signed by Chris Spencer MD on September 13, 2022 16:10 CDT

## 2022-09-14 DIAGNOSIS — R00.0 INCREASED HEART RATE: ICD-10-CM

## 2022-09-14 RX ORDER — CARVEDILOL 3.12 MG/1
TABLET ORAL
Qty: 180 TABLET | Refills: 1 | OUTPATIENT
Start: 2022-09-14

## 2022-09-14 NOTE — TELEPHONE ENCOUNTER
Rx Refill Note  Requested Prescriptions     Refused Prescriptions Disp Refills   • carvedilol (COREG) 3.125 MG tablet [Pharmacy Med Name: CARVEDILOL 3.125 MG TABLET] 180 tablet 1     Sig: TAKE ONE TABLET BY MOUTH TWICE A DAY WITH MEALS      Last office visit with prescribing clinician: 9/13/2022      Next office visit with prescribing clinician: 12/13/2022   {TIP  Encounters:    discontinued on 7/19/2022 by Chris Spencer MD. Pt is on an increase dose. Rx was sent yesterday.    {TIP  Please add Last Relevant Lab Date if appropriate  {TIP  Is Refill Pharmacy correct?  Merlyn Stanton LPN  09/14/22, 08:39 CDT

## 2022-09-15 LAB — 1,25(OH)2D SERPL-MCNC: 27.2 PG/ML (ref 24.8–81.5)

## 2022-09-16 ENCOUNTER — TELEPHONE (OUTPATIENT)
Dept: FAMILY MEDICINE CLINIC | Facility: CLINIC | Age: 52
End: 2022-09-16

## 2022-09-16 NOTE — TELEPHONE ENCOUNTER
----- Message from Chris Spencer MD sent at 9/16/2022  3:14 PM CDT -----  Vit D is low recommend taking Vit D3 5000 twice weekly. WBC are slightly elevated suspect this is from bacteria in GI over growth since you report belching foul gas, may have to cut back on Ozempic, can try Probiotics as we discussed, keep me updated.

## 2022-09-21 ENCOUNTER — LAB (OUTPATIENT)
Dept: LAB | Facility: HOSPITAL | Age: 52
End: 2022-09-21

## 2022-09-21 DIAGNOSIS — R53.82 CHRONIC FATIGUE: ICD-10-CM

## 2022-09-21 PROCEDURE — 84402 ASSAY OF FREE TESTOSTERONE: CPT

## 2022-09-21 PROCEDURE — 84403 ASSAY OF TOTAL TESTOSTERONE: CPT

## 2022-09-23 LAB
TESTOST FREE SERPL-MCNC: 5.4 PG/ML (ref 7.2–24)
TESTOST SERPL-MCNC: 348 NG/DL (ref 264–916)

## 2022-09-26 ENCOUNTER — TELEPHONE (OUTPATIENT)
Dept: FAMILY MEDICINE CLINIC | Facility: CLINIC | Age: 52
End: 2022-09-26

## 2022-10-05 ENCOUNTER — TELEPHONE (OUTPATIENT)
Dept: CARDIOLOGY | Facility: CLINIC | Age: 52
End: 2022-10-05

## 2022-10-05 NOTE — TELEPHONE ENCOUNTER
Results given through [employer]       ----- Message from Jarett Soler MD sent at 10/3/2022  5:21 PM CDT -----  Heart monitor did not show any definite evidence of atrial fibrillation.

## 2022-10-14 ENCOUNTER — OFFICE VISIT (OUTPATIENT)
Dept: UROLOGY | Facility: CLINIC | Age: 52
End: 2022-10-14

## 2022-10-14 VITALS — WEIGHT: 212.2 LBS | BODY MASS INDEX: 28.12 KG/M2 | HEIGHT: 73 IN | TEMPERATURE: 97.6 F

## 2022-10-14 DIAGNOSIS — R79.89 LOW TESTOSTERONE: Primary | ICD-10-CM

## 2022-10-14 LAB
BILIRUB BLD-MCNC: NEGATIVE MG/DL
CLARITY, POC: CLEAR
COLOR UR: YELLOW
GLUCOSE UR STRIP-MCNC: ABNORMAL MG/DL
KETONES UR QL: NEGATIVE
LEUKOCYTE EST, POC: NEGATIVE
NITRITE UR-MCNC: NEGATIVE MG/ML
PH UR: 6 [PH] (ref 5–8)
PROT UR STRIP-MCNC: NEGATIVE MG/DL
RBC # UR STRIP: NEGATIVE /UL
SP GR UR: 1.01 (ref 1–1.03)
UROBILINOGEN UR QL: ABNORMAL

## 2022-10-14 PROCEDURE — 99203 OFFICE O/P NEW LOW 30 MIN: CPT | Performed by: PHYSICIAN ASSISTANT

## 2022-10-18 ENCOUNTER — OFFICE VISIT (OUTPATIENT)
Dept: CARDIOLOGY | Facility: CLINIC | Age: 52
End: 2022-10-18

## 2022-10-18 VITALS
HEIGHT: 73 IN | DIASTOLIC BLOOD PRESSURE: 68 MMHG | HEART RATE: 84 BPM | OXYGEN SATURATION: 98 % | SYSTOLIC BLOOD PRESSURE: 128 MMHG | BODY MASS INDEX: 28.1 KG/M2 | WEIGHT: 212 LBS

## 2022-10-18 DIAGNOSIS — R07.9 CHEST PAIN, UNSPECIFIED TYPE: Primary | ICD-10-CM

## 2022-10-18 DIAGNOSIS — Z72.0 TOBACCO USE: ICD-10-CM

## 2022-10-18 DIAGNOSIS — R00.2 PALPITATIONS: ICD-10-CM

## 2022-10-18 DIAGNOSIS — I10 HYPERTENSION, UNSPECIFIED TYPE: ICD-10-CM

## 2022-10-18 PROCEDURE — 99214 OFFICE O/P EST MOD 30 MIN: CPT | Performed by: INTERNAL MEDICINE

## 2022-10-18 NOTE — PROGRESS NOTES
[facility]  OFFICE NOTE    Cardiovascular Medicine  Jarett Soler M.D., [facility]         No referring provider defined for this encounter.    History of Present Illness    Mine Benedict is a 52 y.o. male who presents for consultation today.  His medical history is significant for hypertension, dyslipidemia, diabetes (insulin-dependent), obesity, tobacco use (current smoker of 1 PPD of cigarettes, has been smoking since age of 26 years), depression, anxiety per prior documentation.    In October 2021, the patient was experiencing palpitations.  At that time, his apple watch noted 5 episodes of atrial fibrillation.  The strips available on apple watch were reviewed in clinic today; most of them suggest sinus rhythm with PACs.  On one strip, atrial fibrillation could not be entirely excluded.  At that time, he was started on carvedilol and his symptoms resolved.  A few weeks ago, he noted his resting heart rate to be significantly elevated (in 80s-90s bpm range).  His carvedilol dosage was increased and his heart rate got better.  He has not had any palpitations over the past few months.  In addition, he reports intermittent episodes of chest discomfort that occur every couple of days.  These do not seem to be definitively precipitated by exertion.  The pain is located across the entire chest, sometimes is felt as sharp, sometimes is felt as tightness.  He denies having any exertional dyspnea, PND, orthopnea or leg swelling.  He has not had any presyncope or syncope.  He denies having any prior history of coronary artery disease, coronary stents, congestive heart failure, open heart surgeries, pacemaker/defibrillator placement.    10/18/2022:  He presented today for a follow-up visit.  Results of recent transthoracic echocardiogram and MCT study were reviewed with him.  He has not had any significant chest discomfort or palpitations since his last visit with us.  He has been  feeling significantly tired; has been following with a urologist; testosterone therapy is being considered.  He has not gotten the stress test done yet.  Home BP readings have been running good.  He continues to smoke cigarettes.    Review of Systems -  Constitution: Negative for weight gain and weight loss.   HENT: Negative for congestion.    Eyes: Negative for blurred vision.   Cardiovascular: As mentioned above  Respiratory: Negative for cough and hemoptysis.    Endocrine: Negative for polydipsia and polyuria.   Hematologic/Lymphatic: Negative for bleeding problem.    Skin: Negative for flushing.   Musculoskeletal: Negative for neck pain and stiffness.   Gastrointestinal: Negative for abdominal pain, nausea and vomiting.   Genitourinary: Negative for dysuria and hematuria.   Neurological: Negative for focal weakness and numbness.   Psychiatric/Behavioral: Negative for altered mental status and depression.      All other systems were reviewed and were negative.    Past Medical History:   Diagnosis Date   • Diabetic neuropathy (HCC)    • Hypertensive disorder    • Lumbago     sciatica due to displacement of lumbar intervertebral disc - S/P Laminectomy      • Major depressive disorder, recurrent (HCC)     unspecified   • Tobacco dependence syndrome    • Type 2 diabetes mellitus (HCC)        Family History: His mother had atrial fibrillation.  family history includes Diabetes in his mother and another family member; Hypertension in his father, mother, and another family member; Other in his sister.    Social History: Denies current or past alcohol or illicit drug abuse.   reports that he has been smoking cigarettes. He has a 24.00 pack-year smoking history. He has never used smokeless tobacco. He reports that he does not drink alcohol and does not use drugs.    Allergies:  No Known Allergies      Current Outpatient Medications:   •  Alpha-Lipoic Acid 300 MG tablet, Take 300 mg by mouth 2 (Two) Times a Day., Disp: 60  each, Rfl: 2  •  busPIRone (BUSPAR) 15 MG tablet, TAKE 1 TABLET BY MOUTH THREE TIMES DAILY. MAY TAKE ADDITIONAL DOSE DAILY, Disp: 120 tablet, Rfl: 3  •  carvedilol (Coreg) 12.5 MG tablet, Take 1 tablet by mouth 2 (Two) Times a Day With Meals., Disp: 180 tablet, Rfl: 1  •  gabapentin (NEURONTIN) 800 MG tablet, Take 1 tablet by mouth 4 (Four) Times a Day., Disp: 120 tablet, Rfl: 2  •  hydroCHLOROthiazide (HYDRODIURIL) 25 MG tablet, Take 1 tablet by mouth Daily., Disp: 90 tablet, Rfl: 1  •  HYDROcodone-acetaminophen (NORCO)  MG per tablet, Take 1 tablet by mouth Every 6 (Six) Hours As Needed for Moderate Pain ., Disp: , Rfl:   •  Insulin Glargine (LANTUS SOLOSTAR) 100 UNIT/ML injection pen, Inject 56 Units under the skin into the appropriate area as directed Every Night., Disp: 24 mL, Rfl: 3  •  Insulin Lispro, 1 Unit Dial, (HumaLOG KwikPen) 100 UNIT/ML solution pen-injector, Inject 20 Units under the skin into the appropriate area as directed 3 (Three) Times a Day Before Meals., Disp: 30 mL, Rfl: 3  •  Insulin Pen Needle (Pen Needles) 32G X 4 MM misc, 1 each 4 (Four) Times a Day. Use to inject insulin 4 times daily, Disp: 200 each, Rfl: 5  •  lisinopril (PRINIVIL,ZESTRIL) 40 MG tablet, Take 1 tablet by mouth Daily., Disp: 90 tablet, Rfl: 1  •  mupirocin (Bactroban) 2 % ointment, Apply  topically to the appropriate area as directed 3 (Three) Times a Day., Disp: 30 g, Rfl: 3  •  naproxen (NAPROSYN) 500 MG tablet, , Disp: , Rfl:   •  QUEtiapine (SEROquel) 25 MG tablet, Take 1 tablet by mouth Every Night. As needed, Disp: 90 tablet, Rfl: 1  •  Semaglutide, 1 MG/DOSE, 4 MG/3ML solution pen-injector, Inject 1 mg under the skin into the appropriate area as directed 1 (One) Time Per Week., Disp: 6 mL, Rfl: 3  •  simvastatin (ZOCOR) 40 MG tablet, Take 1 tablet by mouth Every Evening., Disp: 90 tablet, Rfl: 1  •  vitamin B-12 (CYANOCOBALAMIN) 1000 MCG tablet, Take 1,000 mcg by mouth 3 (Three) Times a Week., Disp: , Rfl:  "    Physical Exam:  Vitals:    10/18/22 1123   BP: 128/68   BP Location: Left arm   Patient Position: Sitting   Cuff Size: Adult   Pulse: 84   SpO2: 98%   Weight: 96.2 kg (212 lb)   Height: 185.4 cm (73\")   PainSc: 0-No pain     Current Pain Level: none  Pulse Ox: Normal  on room air  General: alert, appears stated age and cooperative     Body Habitus: Overweight  HEENT: Head: Normocephalic, no lesions, without obvious abnormality.     Neuro: alert, oriented x3  JVP: Volume/Pulsation: Normal.  Normal waveforms.   Appropriate inspiratory decrease.     Carotid Exam: no bruit normal pulsation bilaterally   Carotid Volume: normal.     Respirations: no increased work of breathing   Chest:  Normal    Pulmonary:Normal   Heart rate: normal    Heart Rhythm: regular     Heart Sounds: S1: normal  S2: normal  S3: absent   S4: absent   Abdomen:   Appearance: normal .  Palpation: Soft, non-tender to palpation, bowel sounds positive in all four quadrants; no guarding or rebound tenderness  Extremity: no edema.       DATA REVIEWED:     EKG. I personally reviewed and interpreted the EKG.  normal EKG, normal sinus rhythm on 12/2/2021    ECG/EMG Results (all)     None        ---------------------------------------------------  TTE/DEIRDRE:  Results for orders placed in visit on 10/13/22    Adult Transthoracic Echo Complete W/ Cont if Necessary Per Protocol    Interpretation Summary  •  Calculated left ventricular 3D EF = 54% Estimated left ventricular EF = 53% Estimated left ventricular EF was in agreement with the calculated left ventricular EF. Left ventricular ejection fraction appears to be 51 - 55%. Left ventricular systolic function is normal.  •  Left ventricular diastolic function was normal.  •  There is no evidence of pericardial effusion.      -----------------------------------------------------  CXR/Imaging:   Imaging Results (Most Recent)     None          -----------------------------------------------------  CT:   No " radiology results for the last 30 days.    ----------------------------------------------------      --------------------------------------------------------------------------------------------------  LABS:     The 10-year CVD risk score (Scott et al., 2008) is: 27.9%    Values used to calculate the score:      Age: 52 years      Sex: Male      Diabetic: Yes      Tobacco smoker: Yes      Systolic Blood Pressure: 138 mmHg      Is BP treated: Yes      HDL Cholesterol: 39 mg/dL      Total Cholesterol: 119 mg/dL         Lab Results   Component Value Date    GLUCOSE 228 (H) 09/13/2022    BUN 8 09/13/2022    CREATININE 0.80 09/13/2022    EGFRIFNONA 96 05/07/2020    BCR 10.0 09/13/2022    K 4.6 09/13/2022    CO2 25.0 09/13/2022    CALCIUM 9.9 09/13/2022    ALBUMIN 3.90 09/13/2022    AST 18 09/13/2022    ALT 12 09/13/2022     Lab Results   Component Value Date    WBC 15.59 (H) 09/13/2022    HGB 16.8 09/13/2022    HCT 49.9 09/13/2022    MCV 95.4 09/13/2022     09/13/2022     Lab Results   Component Value Date    CHOL 119 05/07/2020    CHLPL 142 09/21/2016    TRIG 112 05/07/2020    HDL 39 (L) 05/07/2020    LDL 58 05/07/2020     Lab Results   Component Value Date    TSH 1.170 08/15/2022     No results found for: CKTOTAL, CKMB, CKMBINDEX, TROPONINI, TROPONINT  Lab Results   Component Value Date    HGBA1C 7.9 09/13/2022     No results found for: DDIMER  Lab Results   Component Value Date    ALT 12 09/13/2022     Lab Results   Component Value Date    HGBA1C 7.9 09/13/2022    HGBA1C 8.4 06/13/2022    HGBA1C 10.6 03/02/2022     Lab Results   Component Value Date    MICROALBUR <1.2 05/07/2020    CREATININE 0.80 09/13/2022     Lab Results   Component Value Date    IRON 79 09/13/2022     No results found for: INR, PROTIME    [unfilled]    1. Chest pain, unspecified type  He reported experiencing intermittent episodes of bilateral chest discomfort at last visit.  He has not had any further chest discomfort since his  last visit with us.  Exercise treadmill stress testing was recommended at last visit; he has not gotten that done yet; we will get it rescheduled for him.  - Treadmill Stress Test; Future    2. Palpitations  In October 2021, the patient was experiencing palpitations.  At that time, his apple watch noted 5 episodes of atrial fibrillation.  The strips available on apple watch were reviewed in clinic today; most of them suggest sinus rhythm with PACs.  On one strip, atrial fibrillation could not be entirely excluded.  At that time, he was started on carvedilol and his symptoms resolved.  A few weeks prior to his initial visit with us, he noted his resting heart rate to be significantly elevated (in 80s-90s bpm range).  His carvedilol dosage was increased and his heart rate got better.  He has not had any palpitations since then.  Serum TSH level was normal in August 2022.  Transthoracic echocardiogram in October 2022 did not show any hemodynamically significant structural heart disease.  A 30-day MCT study in August 2022 did not show any hemodynamically significant arrhythmias.  Occasional PACs were noted.  Continue carvedilol at current doses.  He has declined longer-term monitoring with loop recorder placement.  I have asked him to record some strips from apple watch when he notices recurrence of palpitations and send them for our review.  He will also consider getting evaluated in the emergency room when he notices recurrence of palpitations.    3. Hypertension, unspecified type  Clinic BP was 128/68 mm Hg today.  His home BP readings (based on verbal report) continue to be within normal range.  No changes were made to his antihypertensive medication regimen today.    4. Tobacco use  He was counseled to quit as detailed below.  He does not appear to be willing to quit.    The patient has been made aware of possible increased risk of major adverse cardiovascular events with testosterone therapy.  He understands this  risk and will be discussing testosterone therapy with his urologist.    Prevention:  BMI is >= 25 and <30. (Overweight) The following options were offered after discussion;: referral to primary care      Mine Benedict  reports that he has been smoking cigarettes. He has a 24.00 pack-year smoking history. He has never used smokeless tobacco.. I have educated him on the risk of diseases from using tobacco products such as cancer, COPD and heart disease.     I advised him to quit and he is not willing to quit.    I spent 3  minutes counseling the patient.    Return in about 6 months (around 4/18/2023).            Electronically signed by Jarett Soler MD on 10/18/22 at 08:47 CDT

## 2022-10-19 ENCOUNTER — TELEPHONE (OUTPATIENT)
Dept: UROLOGY | Facility: CLINIC | Age: 52
End: 2022-10-19

## 2022-10-19 NOTE — TELEPHONE ENCOUNTER
Pt called he seen his lab results in his chart and wanted to know when he should start the medication and when will we call him with the results..please advise

## 2022-10-19 NOTE — TELEPHONE ENCOUNTER
I called the pt lmvm to let him know that once we get the results back we will call with the results. I sent a message to lab about the pt results

## 2022-10-20 LAB
ESTROGEN SERPL-MCNC: 84 PG/ML (ref 56–213)
FSH SERPL-ACNC: 4.9 MIU/ML (ref 1.5–12.4)
PROLACTIN SERPL-MCNC: 4.2 NG/ML (ref 4–15.2)
TESTOST FREE SERPL-MCNC: 4.6 PG/ML (ref 7.2–24)
TESTOST SERPL-MCNC: 368 NG/DL (ref 264–916)

## 2022-10-21 ENCOUNTER — TELEPHONE (OUTPATIENT)
Dept: UROLOGY | Facility: CLINIC | Age: 52
End: 2022-10-21

## 2022-10-21 NOTE — TELEPHONE ENCOUNTER
Pt called regarding the mix up with test results. I informed him that I had to talk to Daniela and she told me that the results we are waiting on will take 4-5 days to come back.

## 2022-10-21 NOTE — TELEPHONE ENCOUNTER
I spoke to pt and gave the results per Terrence and pt stated that was fine he knew it would be low.. I told him once we get his estrogen level back will give the doctor a script to sign and send it to [facility] in [place] for the injections. Pt voiced understanding

## 2022-10-24 DIAGNOSIS — R79.89 LOW TESTOSTERONE: Primary | ICD-10-CM

## 2022-10-24 RX ORDER — TESTOSTERONE CYPIONATE 200 MG/ML
200 VIAL (ML) INTRAMUSCULAR
Qty: 2 ML | Refills: 5 | Status: SHIPPED | OUTPATIENT
Start: 2022-10-24

## 2022-10-28 DIAGNOSIS — F32.A DEPRESSION, UNSPECIFIED DEPRESSION TYPE: ICD-10-CM

## 2022-10-28 DIAGNOSIS — E11.42 DIABETIC POLYNEUROPATHY ASSOCIATED WITH TYPE 2 DIABETES MELLITUS: ICD-10-CM

## 2022-10-28 RX ORDER — DULOXETIN HYDROCHLORIDE 60 MG/1
CAPSULE, DELAYED RELEASE ORAL
Qty: 90 CAPSULE | Refills: 1 | OUTPATIENT
Start: 2022-10-28

## 2023-01-25 DIAGNOSIS — E11.42 DIABETIC POLYNEUROPATHY ASSOCIATED WITH TYPE 2 DIABETES MELLITUS: ICD-10-CM

## 2023-01-25 DIAGNOSIS — F32.A DEPRESSION, UNSPECIFIED DEPRESSION TYPE: ICD-10-CM

## 2023-01-25 DIAGNOSIS — R00.0 INCREASED HEART RATE: ICD-10-CM

## 2023-01-25 RX ORDER — CARVEDILOL 3.12 MG/1
TABLET ORAL
Qty: 180 TABLET | Refills: 1 | OUTPATIENT
Start: 2023-01-25

## 2023-01-25 RX ORDER — DULOXETIN HYDROCHLORIDE 60 MG/1
CAPSULE, DELAYED RELEASE ORAL
Qty: 90 CAPSULE | Refills: 1 | OUTPATIENT
Start: 2023-01-25

## 2023-01-25 NOTE — TELEPHONE ENCOUNTER
Rx Refill Note  Requested Prescriptions     Pending Prescriptions Disp Refills   • carvedilol (COREG) 3.125 MG tablet [Pharmacy Med Name: CARVEDILOL 3.125 MG TABLET] 180 tablet 1     Sig: TAKE ONE TABLET BY MOUTH TWICE A DAY WITH MEALS   • DULoxetine (CYMBALTA) 60 MG capsule [Pharmacy Med Name: DULOXETINE HCL DR 60 MG CAPSULE] 90 capsule 1     Sig: TAKE ONE CAPSULE BY MOUTH DAILY      Last office visit with prescribing clinician: 9/13/2022   Last telemedicine visit with prescribing clinician: Visit date not found   Next office visit with prescribing clinician: Visit date not found   {TIP  Encounters:    Carvedilol: discontinued on 7/19/2022 by Chris Spencer MD.   Duloxetine: discontinued on 6/13/2022 by Chris Spencer MD for the following reason: *Therapy completed.                      Would you like a call back once the refill request has been completed: [] Yes [] No    If the office needs to give you a call back, can they leave a voicemail: [] Yes [] No    Merlyn Stanton LPN  01/25/23, 15:49 CST

## 2023-02-16 DIAGNOSIS — F32.A DEPRESSION, UNSPECIFIED DEPRESSION TYPE: ICD-10-CM

## 2023-02-16 DIAGNOSIS — R00.0 INCREASED HEART RATE: ICD-10-CM

## 2023-02-16 DIAGNOSIS — E11.42 DIABETIC POLYNEUROPATHY ASSOCIATED WITH TYPE 2 DIABETES MELLITUS: ICD-10-CM

## 2023-02-16 RX ORDER — DULOXETIN HYDROCHLORIDE 60 MG/1
CAPSULE, DELAYED RELEASE ORAL
Qty: 90 CAPSULE | Refills: 1 | OUTPATIENT
Start: 2023-02-16

## 2023-02-16 RX ORDER — CARVEDILOL 3.12 MG/1
TABLET ORAL
Qty: 180 TABLET | Refills: 1 | OUTPATIENT
Start: 2023-02-16

## 2023-03-14 DIAGNOSIS — E11.42 DIABETIC POLYNEUROPATHY ASSOCIATED WITH TYPE 2 DIABETES MELLITUS: ICD-10-CM

## 2023-03-14 DIAGNOSIS — R00.0 INCREASED HEART RATE: ICD-10-CM

## 2023-03-14 DIAGNOSIS — F41.9 ANXIETY: ICD-10-CM

## 2023-03-14 DIAGNOSIS — E78.2 MIXED HYPERLIPIDEMIA: ICD-10-CM

## 2023-03-14 DIAGNOSIS — I10 ESSENTIAL HYPERTENSION: ICD-10-CM

## 2023-03-14 DIAGNOSIS — F32.A DEPRESSION, UNSPECIFIED DEPRESSION TYPE: ICD-10-CM

## 2023-03-14 RX ORDER — DULOXETIN HYDROCHLORIDE 60 MG/1
CAPSULE, DELAYED RELEASE ORAL
Qty: 90 CAPSULE | Refills: 1 | OUTPATIENT
Start: 2023-03-14

## 2023-03-14 RX ORDER — CARVEDILOL 12.5 MG/1
12.5 TABLET ORAL 2 TIMES DAILY WITH MEALS
Qty: 180 TABLET | Refills: 0 | Status: SHIPPED | OUTPATIENT
Start: 2023-03-14

## 2023-03-14 RX ORDER — BUSPIRONE HYDROCHLORIDE 15 MG/1
TABLET ORAL
Qty: 120 TABLET | Refills: 0 | Status: SHIPPED | OUTPATIENT
Start: 2023-03-14

## 2023-03-14 RX ORDER — CARVEDILOL 12.5 MG/1
TABLET ORAL
Qty: 180 TABLET | Refills: 1 | OUTPATIENT
Start: 2023-03-14

## 2023-03-14 RX ORDER — SIMVASTATIN 40 MG
TABLET ORAL
Qty: 90 TABLET | Refills: 1 | OUTPATIENT
Start: 2023-03-14

## 2023-03-14 RX ORDER — CARVEDILOL 3.12 MG/1
TABLET ORAL
Qty: 180 TABLET | Refills: 1 | OUTPATIENT
Start: 2023-03-14

## 2023-03-14 RX ORDER — SIMVASTATIN 40 MG
40 TABLET ORAL EVERY EVENING
Qty: 90 TABLET | Refills: 0 | Status: SHIPPED | OUTPATIENT
Start: 2023-03-14

## 2023-03-14 RX ORDER — BUSPIRONE HYDROCHLORIDE 15 MG/1
TABLET ORAL
Qty: 120 TABLET | Refills: 3 | OUTPATIENT
Start: 2023-03-14

## 2023-03-14 NOTE — TELEPHONE ENCOUNTER
Rx Refill Note  Requested Prescriptions     Pending Prescriptions Disp Refills   • carvedilol (Coreg) 12.5 MG tablet 180 tablet 0     Sig: Take 1 tablet by mouth 2 (Two) Times a Day With Meals.   • simvastatin (ZOCOR) 40 MG tablet 90 tablet 0     Sig: Take 1 tablet by mouth Every Evening.   • busPIRone (BUSPAR) 15 MG tablet 120 tablet 0     Sig: TAKE 1 TABLET BY MOUTH THREE TIMES DAILY. MAY TAKE ADDITIONAL DOSE DAILY      Last office visit with prescribing clinician: 9/13/2022   Last telemedicine visit with prescribing clinician: 3/14/2023   Next office visit with prescribing clinician: 3/14/2023   {TIP  Encounters:    THESE REFILL REQUESTS CAME FROM [facility].  DO YOU STILL REFILL HIS MEDS SINCE HE IS INCARCERATED?    {TIP  Please add Last Relevant Lab Date if appropriate             {TIP  Is Refill Pharmacy correct? YES    Would you like a call back once the refill request has been completed: [] Yes [] No    If the office needs to give you a call back, can they leave a voicemail: [] Yes [] No    Merlyn Stanton LPN  03/14/23, 08:34 CDT

## 2023-03-23 DIAGNOSIS — E11.8 TYPE 2 DIABETES MELLITUS WITH COMPLICATION, WITH LONG-TERM CURRENT USE OF INSULIN: ICD-10-CM

## 2023-03-23 DIAGNOSIS — Z79.4 TYPE 2 DIABETES MELLITUS WITH COMPLICATION, WITH LONG-TERM CURRENT USE OF INSULIN: ICD-10-CM

## 2023-03-23 RX ORDER — INSULIN GLARGINE 100 [IU]/ML
INJECTION, SOLUTION SUBCUTANEOUS
Qty: 18 ML | OUTPATIENT
Start: 2023-03-23

## 2023-04-08 DIAGNOSIS — I10 ESSENTIAL HYPERTENSION: ICD-10-CM

## 2023-04-10 DIAGNOSIS — E11.42 DIABETIC POLYNEUROPATHY ASSOCIATED WITH TYPE 2 DIABETES MELLITUS: ICD-10-CM

## 2023-04-10 DIAGNOSIS — F32.A DEPRESSION, UNSPECIFIED DEPRESSION TYPE: ICD-10-CM

## 2023-04-10 DIAGNOSIS — I10 ESSENTIAL HYPERTENSION: ICD-10-CM

## 2023-04-10 RX ORDER — LISINOPRIL 40 MG/1
TABLET ORAL
Qty: 90 TABLET | Refills: 1 | OUTPATIENT
Start: 2023-04-10

## 2023-04-11 DIAGNOSIS — E11.8 TYPE 2 DIABETES MELLITUS WITH COMPLICATION, WITH LONG-TERM CURRENT USE OF INSULIN: ICD-10-CM

## 2023-04-11 DIAGNOSIS — Z79.4 TYPE 2 DIABETES MELLITUS WITH COMPLICATION, WITH LONG-TERM CURRENT USE OF INSULIN: ICD-10-CM

## 2023-04-11 RX ORDER — DULOXETIN HYDROCHLORIDE 60 MG/1
CAPSULE, DELAYED RELEASE ORAL
Qty: 90 CAPSULE | Refills: 1 | OUTPATIENT
Start: 2023-04-11

## 2023-04-11 RX ORDER — LISINOPRIL 40 MG/1
TABLET ORAL
Qty: 90 TABLET | Refills: 1 | OUTPATIENT
Start: 2023-04-11

## 2023-04-12 DIAGNOSIS — Z79.4 TYPE 2 DIABETES MELLITUS WITH COMPLICATION, WITH LONG-TERM CURRENT USE OF INSULIN: ICD-10-CM

## 2023-04-12 DIAGNOSIS — E11.8 TYPE 2 DIABETES MELLITUS WITH COMPLICATION, WITH LONG-TERM CURRENT USE OF INSULIN: ICD-10-CM

## 2023-04-12 RX ORDER — INSULIN GLARGINE 100 [IU]/ML
INJECTION, SOLUTION SUBCUTANEOUS
Qty: 18 ML | OUTPATIENT
Start: 2023-04-12

## 2023-04-12 RX ORDER — INSULIN GLARGINE 100 [IU]/ML
INJECTION, SOLUTION SUBCUTANEOUS
Qty: 54 ML | OUTPATIENT
Start: 2023-04-12

## 2023-04-15 DIAGNOSIS — F32.A DEPRESSION, UNSPECIFIED DEPRESSION TYPE: ICD-10-CM

## 2023-04-15 DIAGNOSIS — E11.42 DIABETIC POLYNEUROPATHY ASSOCIATED WITH TYPE 2 DIABETES MELLITUS: ICD-10-CM

## 2023-04-15 DIAGNOSIS — I10 ESSENTIAL HYPERTENSION: ICD-10-CM

## 2023-04-17 RX ORDER — DULOXETIN HYDROCHLORIDE 60 MG/1
CAPSULE, DELAYED RELEASE ORAL
Qty: 90 CAPSULE | Refills: 1 | OUTPATIENT
Start: 2023-04-17

## 2023-04-17 RX ORDER — LISINOPRIL 40 MG/1
TABLET ORAL
Qty: 90 TABLET | Refills: 1 | OUTPATIENT
Start: 2023-04-17

## 2023-04-21 DIAGNOSIS — F41.9 ANXIETY: ICD-10-CM

## 2023-04-21 DIAGNOSIS — I10 ESSENTIAL HYPERTENSION: ICD-10-CM

## 2023-04-21 RX ORDER — HYDROCHLOROTHIAZIDE 25 MG/1
TABLET ORAL
Qty: 30 TABLET | OUTPATIENT
Start: 2023-04-21

## 2023-04-21 RX ORDER — BUSPIRONE HYDROCHLORIDE 15 MG/1
TABLET ORAL
Qty: 120 TABLET | Refills: 0 | OUTPATIENT
Start: 2023-04-21

## 2023-05-12 DIAGNOSIS — I10 ESSENTIAL HYPERTENSION: ICD-10-CM

## 2023-05-12 DIAGNOSIS — F41.9 ANXIETY: ICD-10-CM

## 2023-05-12 RX ORDER — LISINOPRIL 40 MG/1
TABLET ORAL
Qty: 90 TABLET | Refills: 1 | OUTPATIENT
Start: 2023-05-12

## 2023-05-12 RX ORDER — BUSPIRONE HYDROCHLORIDE 15 MG/1
TABLET ORAL
Qty: 120 TABLET | Refills: 0 | OUTPATIENT
Start: 2023-05-12

## 2023-05-12 RX ORDER — HYDROCHLOROTHIAZIDE 25 MG/1
TABLET ORAL
Qty: 30 TABLET | OUTPATIENT
Start: 2023-05-12

## 2023-08-08 DIAGNOSIS — F41.9 ANXIETY: ICD-10-CM

## 2023-08-08 DIAGNOSIS — I10 ESSENTIAL HYPERTENSION: ICD-10-CM

## 2023-08-08 DIAGNOSIS — E78.2 MIXED HYPERLIPIDEMIA: ICD-10-CM

## 2023-08-08 RX ORDER — SIMVASTATIN 40 MG
TABLET ORAL
Qty: 90 TABLET | Refills: 0 | OUTPATIENT
Start: 2023-08-08

## 2023-08-08 RX ORDER — BUSPIRONE HYDROCHLORIDE 15 MG/1
TABLET ORAL
Qty: 120 TABLET | Refills: 0 | OUTPATIENT
Start: 2023-08-08

## 2023-08-08 RX ORDER — CARVEDILOL 12.5 MG/1
TABLET ORAL
Qty: 180 TABLET | Refills: 0 | OUTPATIENT
Start: 2023-08-08

## 2023-08-08 RX ORDER — LISINOPRIL 40 MG/1
TABLET ORAL
Qty: 90 TABLET | Refills: 1 | OUTPATIENT
Start: 2023-08-08

## 2023-08-08 RX ORDER — HYDROCHLOROTHIAZIDE 25 MG/1
TABLET ORAL
Qty: 30 TABLET | OUTPATIENT
Start: 2023-08-08

## 2024-01-14 NOTE — TELEPHONE ENCOUNTER
OK to change Short acting insulin to Ins formulary, same instructions.    You were evaluated the emergency department due to complication with your braces.  The broken piece of your braces was removed.  Please follow-up with your dentist as soon as possible.  Please return to the emergency department for any worsening symptoms, questions or concerns.